# Patient Record
Sex: FEMALE | Race: WHITE | NOT HISPANIC OR LATINO | Employment: OTHER | URBAN - METROPOLITAN AREA
[De-identification: names, ages, dates, MRNs, and addresses within clinical notes are randomized per-mention and may not be internally consistent; named-entity substitution may affect disease eponyms.]

---

## 2017-11-09 ENCOUNTER — OFFICE VISIT (OUTPATIENT)
Dept: OBSTETRICS AND GYNECOLOGY | Facility: CLINIC | Age: 72
End: 2017-11-09

## 2017-11-09 VITALS
HEIGHT: 63 IN | BODY MASS INDEX: 20.73 KG/M2 | SYSTOLIC BLOOD PRESSURE: 113 MMHG | DIASTOLIC BLOOD PRESSURE: 62 MMHG | HEART RATE: 55 BPM | WEIGHT: 117 LBS

## 2017-11-09 DIAGNOSIS — N39.46 MIXED INCONTINENCE: ICD-10-CM

## 2017-11-09 DIAGNOSIS — Z01.419 VISIT FOR GYNECOLOGIC EXAMINATION: Primary | ICD-10-CM

## 2017-11-09 DIAGNOSIS — M81.0 AGE-RELATED OSTEOPOROSIS WITHOUT CURRENT PATHOLOGICAL FRACTURE: ICD-10-CM

## 2017-11-09 DIAGNOSIS — N95.2 ATROPHIC VAGINITIS: ICD-10-CM

## 2017-11-09 LAB
BILIRUB BLD-MCNC: NEGATIVE MG/DL
CLARITY, POC: CLEAR
COLOR UR: YELLOW
GLUCOSE UR STRIP-MCNC: NEGATIVE MG/DL
KETONES UR QL: NEGATIVE
LEUKOCYTE EST, POC: ABNORMAL
NITRITE UR-MCNC: NEGATIVE MG/ML
PH UR: 5.5 [PH] (ref 5–8)
PROT UR STRIP-MCNC: NEGATIVE MG/DL
RBC # UR STRIP: NEGATIVE /UL
SP GR UR: 1.02 (ref 1–1.03)
UROBILINOGEN UR QL: NORMAL

## 2017-11-09 PROCEDURE — 81002 URINALYSIS NONAUTO W/O SCOPE: CPT | Performed by: OBSTETRICS & GYNECOLOGY

## 2017-11-09 PROCEDURE — 99397 PER PM REEVAL EST PAT 65+ YR: CPT | Performed by: OBSTETRICS & GYNECOLOGY

## 2017-11-09 RX ORDER — OMEPRAZOLE 20 MG/1
CAPSULE, DELAYED RELEASE ORAL
COMMUNITY
Start: 2017-11-06 | End: 2020-01-18 | Stop reason: HOSPADM

## 2017-11-09 RX ORDER — IBUPROFEN 800 MG/1
TABLET ORAL
COMMUNITY
Start: 2017-06-20 | End: 2020-01-18 | Stop reason: HOSPADM

## 2017-11-09 RX ORDER — VITAMIN E 268 MG
400 CAPSULE ORAL
COMMUNITY
End: 2020-01-18 | Stop reason: HOSPADM

## 2017-11-09 RX ORDER — METOPROLOL SUCCINATE 50 MG/1
TABLET, EXTENDED RELEASE ORAL
COMMUNITY
Start: 2017-11-06 | End: 2020-01-18 | Stop reason: HOSPADM

## 2017-11-09 RX ORDER — TOPIRAMATE 25 MG/1
TABLET ORAL
Refills: 3 | COMMUNITY
Start: 2017-10-10 | End: 2022-11-10 | Stop reason: ALTCHOICE

## 2017-11-09 RX ORDER — TELMISARTAN AND HYDROCHLORTHIAZIDE 80; 12.5 MG/1; MG/1
TABLET ORAL
Refills: 3 | COMMUNITY
Start: 2017-09-08 | End: 2020-01-18 | Stop reason: HOSPADM

## 2017-11-09 RX ORDER — IBUPROFEN 200 MG
1 CAPSULE ORAL
COMMUNITY

## 2017-11-09 RX ORDER — GUAIFENESIN 600 MG/1
600 TABLET, EXTENDED RELEASE ORAL
COMMUNITY
Start: 2017-01-10 | End: 2019-12-30

## 2017-11-09 RX ORDER — ESTRADIOL 0.1 MG/G
2 CREAM VAGINAL 3 TIMES WEEKLY
Qty: 42.5 G | Refills: 3 | Status: SHIPPED | OUTPATIENT
Start: 2017-11-10 | End: 2019-12-30

## 2017-11-09 NOTE — PROGRESS NOTES
Subjective   Della Vines is a 71 y.o. female   CC: Annual exam, urinary leakage/urgency  History of Present Illness  Pt here for annual.  She has previously seen my partner Dr. Carroll.  Her last visit was October 2015.  DEXA scan at that time showed osteoporosis.  The patient reported she did not want to continue with the bisphosphonate therapy.  She is taking vitamin D and calcium supplementation.  She has not had a DEXA scan since then.  She has a history of a hysterectomy in the past.  She has no previous history of cervical dysplasia.  She no longer requires Pap smears.  She is going to do her mammogram this month.  She does have a history of fibrocystic breasts and has had some breast biopsies in the past.  The patient does perform her own breast exams.  She is no longer a smoker.  The patient does report some issues with urinary frequency, urgency, and leakage of urine.  She does report occasional episodes of small amount of leakage with cough and sneeze.  Additionally, she reports that she often cannot make it to the bathroom in time.  She has tried therapy with anticholinergics in the past, but wanted to stop them because of side effects of dry mouth.  She has never taken estrogen replacement therapy or vaginal Estrace cream.  The patient does have a history of TIAs.  She currently takes an aspirin pill daily.  The patient does report issues with vaginal discomfort and painful intercourse.  She has not been sexually active with her  for about the last 5 years, because of concerns with both pain during intercourse and with leakage of urine during intercourse which was embarrassing for her.    The following portions of the patient's history were reviewed and updated as appropriate: allergies, current medications, past family history, past medical history, past social history, past surgical history and problem list.    Review of Systems  General: No fever or chills  Constitutional: No weight loss or gain,  "no hair loss  HENT: No headache, no hearing loss, no tinnitus  Eyes: normal vision, no eye pain  Lungs: No cough, no shortness of breath  Heart: No chest pain, no palpitations  Abdomen: No nausea, vomiting, constipation or diarrhea  : No dysuria, no hematuria  Skin: No rashes  Lymph: No swelling  Neuro: No parathesia, no weakness  Psych: Normal though content, no hallucinations, no SI/HI    Objective   Physical Exam  Vitals:    11/09/17 1025   BP: 113/62   Pulse: 55   Weight: 117 lb (53.1 kg)   Height: 62.5\" (158.8 cm)   Gen: No acute distress, awake and oriented times three  HENT: Normocephalic, atraumatic, Moist mucous membranes  Eyes: PERRLA, EOMI  Neck: Supple, normal range of motion, no thyromegaly  Lungs: Normal work of breathing, lungs clear bilaterally, no crackles/wheezes  Heart: Regular rate and rhythm, no murmurs  Abdomen: soft, nontender, non distended, normoactive bowel sounds  Breast: Symmetrical. No skin changes or nipple retractions. No lumps or masses bilaterally. No tenderness bilaterally.  Pelvic:   Normal external female genitalia, no lesions  Vagina: No blood or discharge, significant atrophy noted, no lesions, No significant cystocele/rectocele  Cervix: Absent, cuff intact no masses  Uterus: Surgically absent  Adnexa: No masses or tenderness  Rectal: Deferred  Skin: Warm and dry, no rashes  Psych: Good judgement and insight, normal affect and mood  Neuro: CN 2-12 intact, no gross deficits    Assessment/Plan   Diagnoses and all orders for this visit:    Visit for gynecologic examination    Mixed incontinence  -     estradiol (ESTRACE VAGINAL) 0.1 MG/GM vaginal cream; Insert 2 g into the vagina 3 (Three) Times a Week.    Atrophic vaginitis  -     estradiol (ESTRACE VAGINAL) 0.1 MG/GM vaginal cream; Insert 2 g into the vagina 3 (Three) Times a Week.    Age-related osteoporosis without current pathological fracture    Patient no longer needs Pap smears.  I recommend that she repeat a DEXA scan at " this time because of her history of osteoporosis.  She is scheduled for mammogram next year.  The patient does have symptoms of mixed incontinence as well as significant vaginal atrophy on exam.  Patient reports that she has not been sexually active because of these concerns.  She also reports vaginal irritation.  I feel that she may get some benefit from topical vaginal estrogen cream.  We discussed the risks and benefits of this medication.  I explained that there are well designed studies such as the women's health initiative which showed no significant increase of breast cancer risk in women taking estrogen only supplementation.  Additionally, there has been shown to be no significant risk of venous thromboembolism in patients taking small amounts of vaginal estrogen.  I believe this would be a safe therapy for her.  The patient wishes to start this therapy.  Instructions for use were given.  If she does not see any improvement in her symptoms over the next 4 weeks, she may discontinue vaginal estrogen.  Otherwise she may continue it 3 times weekly.  I will plan to see her back in one year for annual exam.

## 2017-11-13 ENCOUNTER — TELEPHONE (OUTPATIENT)
Dept: OBSTETRICS AND GYNECOLOGY | Facility: CLINIC | Age: 72
End: 2017-11-13

## 2017-11-13 NOTE — TELEPHONE ENCOUNTER
Mavis     I put in order. If it shows up in my overdue box because they do not use it. I am sending to you to cancel.     Thanks   Dr. Champion    ----- Message from Estela Hernandez sent at 11/13/2017  9:14 AM EST -----  Please place order for DEXA scan (per notes). Pt to schedule at [facility].    Thanks!

## 2017-11-28 ENCOUNTER — TELEPHONE (OUTPATIENT)
Dept: OBSTETRICS AND GYNECOLOGY | Facility: CLINIC | Age: 72
End: 2017-11-28

## 2017-11-28 DIAGNOSIS — Z13.820 SCREENING FOR OSTEOPOROSIS: Primary | ICD-10-CM

## 2017-11-28 NOTE — TELEPHONE ENCOUNTER
Cooper     That is done     Thanks   Dr. Champion    ----- Message from Leslie Causey sent at 11/28/2017  1:20 PM EST -----  Contact: [facility]  [facility] called stating theres no diagnosis to the patients DEXA scan referral and was wondering if you could finalize it so she can pull it? Would you be able to do that?

## 2017-11-30 ENCOUNTER — APPOINTMENT (OUTPATIENT)
Dept: WOMENS IMAGING | Facility: HOSPITAL | Age: 72
End: 2017-11-30

## 2017-11-30 PROCEDURE — G0202 SCR MAMMO BI INCL CAD: HCPCS | Performed by: RADIOLOGY

## 2017-11-30 PROCEDURE — 77080 DXA BONE DENSITY AXIAL: CPT | Performed by: RADIOLOGY

## 2017-11-30 PROCEDURE — 77063 BREAST TOMOSYNTHESIS BI: CPT | Performed by: RADIOLOGY

## 2017-12-04 ENCOUNTER — TELEPHONE (OUTPATIENT)
Dept: OBSTETRICS AND GYNECOLOGY | Facility: CLINIC | Age: 72
End: 2017-12-04

## 2017-12-04 NOTE — TELEPHONE ENCOUNTER
----- Message from Danita Goodrich MA sent at 12/4/2017 12:48 PM EST -----  Soumya roman pt/alberto  ----- Message -----     From: Estuardo Champion MD     Sent: 12/4/2017  12:33 PM       To: Danita Goodrich MA    Danita, Mild osteoporosis, but her fracture risk does not require treatment with bisphosphonate - Weight bearing exercise, dietary calcium of 1200 mg per day and vitamin D all encouraged.  Please let her know, ok to come in and discuss medication if she desires. Thanks Dr. Champion

## 2017-12-08 ENCOUNTER — DOCUMENTATION (OUTPATIENT)
Dept: OBSTETRICS AND GYNECOLOGY | Facility: CLINIC | Age: 72
End: 2017-12-08

## 2017-12-08 NOTE — PROGRESS NOTES
I spoke with the patient at length over the telephone about her DEXA scan results.  T score the L-spine was -2.7.  This is actually stable from her previous DEXA scan about 2 years ago.  The patient has not been on any type of bisphosphonate therapy since then.  She previously took bisphosphonates but could not tolerate them over concerns about side effects of GERD.  I offered the patient either a trial of bisphosphonate therapy again versus close observation with continued vitamin D and calcium and supplementation.  Patient reports her primary care physician also contacted her regarding the DEXA results and wanted to evaluate her.  One option for the patient will be trial of the second line agent, which I feel it would be best for her to discuss with her primary care physician.  She verbalizes understanding.  Otherwise I'll plan to see her back yearly for her annual exam.

## 2019-05-08 ENCOUNTER — TRANSCRIBE ORDERS (OUTPATIENT)
Dept: CARDIOLOGY | Facility: CLINIC | Age: 74
End: 2019-05-08

## 2019-05-08 DIAGNOSIS — I73.9 CLAUDICATION (HCC): ICD-10-CM

## 2019-05-08 DIAGNOSIS — R07.9 CHEST PAIN, UNSPECIFIED TYPE: Primary | ICD-10-CM

## 2019-05-14 ENCOUNTER — HOSPITAL ENCOUNTER (OUTPATIENT)
Dept: CARDIOLOGY | Facility: HOSPITAL | Age: 74
Discharge: HOME | End: 2019-05-14

## 2019-05-14 ENCOUNTER — HOSPITAL ENCOUNTER (OUTPATIENT)
Dept: CARDIOLOGY | Facility: HOSPITAL | Age: 74
Discharge: HOME | End: 2019-05-14
Admitting: INTERNAL MEDICINE

## 2019-05-14 VITALS — WEIGHT: 117 LBS | HEIGHT: 63 IN | BODY MASS INDEX: 20.73 KG/M2

## 2019-05-14 DIAGNOSIS — R07.9 CHEST PAIN, UNSPECIFIED TYPE: ICD-10-CM

## 2019-05-14 DIAGNOSIS — I73.9 CLAUDICATION (HCC): ICD-10-CM

## 2019-05-14 LAB
ASCENDING AORTA: 2.4 CM
BH CV ECHO MEAS - ACS: 1.6 CM
BH CV ECHO MEAS - AO MAX PG (FULL): 2.9 MMHG
BH CV ECHO MEAS - AO MAX PG: 9.1 MMHG
BH CV ECHO MEAS - AO MEAN PG (FULL): 2 MMHG
BH CV ECHO MEAS - AO MEAN PG: 4.6 MMHG
BH CV ECHO MEAS - AO ROOT AREA (BSA CORRECTED): 1.7
BH CV ECHO MEAS - AO ROOT AREA: 5.3 CM^2
BH CV ECHO MEAS - AO ROOT DIAM: 2.6 CM
BH CV ECHO MEAS - AO V2 MAX: 150.8 CM/SEC
BH CV ECHO MEAS - AO V2 MEAN: 98.5 CM/SEC
BH CV ECHO MEAS - AO V2 VTI: 36.1 CM
BH CV ECHO MEAS - AVA(I,A): 1.9 CM^2
BH CV ECHO MEAS - AVA(I,D): 1.9 CM^2
BH CV ECHO MEAS - AVA(V,A): 2.3 CM^2
BH CV ECHO MEAS - AVA(V,D): 2.3 CM^2
BH CV ECHO MEAS - BSA(HAYCOCK): 1.5 M^2
BH CV ECHO MEAS - BSA: 1.5 M^2
BH CV ECHO MEAS - BZI_BMI: 20 KILOGRAMS/M^2
BH CV ECHO MEAS - BZI_METRIC_HEIGHT: 160 CM
BH CV ECHO MEAS - BZI_METRIC_WEIGHT: 51.3 KG
BH CV ECHO MEAS - EDV(MOD-SP2): 46 ML
BH CV ECHO MEAS - EDV(MOD-SP4): 51 ML
BH CV ECHO MEAS - EDV(TEICH): 73 ML
BH CV ECHO MEAS - EF(CUBED): 78 %
BH CV ECHO MEAS - EF(MOD-BP): 61 %
BH CV ECHO MEAS - EF(MOD-SP2): 60.9 %
BH CV ECHO MEAS - EF(MOD-SP4): 60.8 %
BH CV ECHO MEAS - EF(TEICH): 70.7 %
BH CV ECHO MEAS - ESV(MOD-SP2): 18 ML
BH CV ECHO MEAS - ESV(MOD-SP4): 20 ML
BH CV ECHO MEAS - ESV(TEICH): 21.4 ML
BH CV ECHO MEAS - IVS/LVPW: 1.1
BH CV ECHO MEAS - IVSD: 1 CM
BH CV ECHO MEAS - LAT PEAK E' VEL: 12 CM/SEC
BH CV ECHO MEAS - LV DIASTOLIC VOL/BSA (35-75): 33.6 ML/M^2
BH CV ECHO MEAS - LV MASS(C)D: 120 GRAMS
BH CV ECHO MEAS - LV MASS(C)DI: 79.1 GRAMS/M^2
BH CV ECHO MEAS - LV MAX PG: 6.2 MMHG
BH CV ECHO MEAS - LV MEAN PG: 2.6 MMHG
BH CV ECHO MEAS - LV SYSTOLIC VOL/BSA (12-30): 13.2 ML/M^2
BH CV ECHO MEAS - LV V1 MAX: 124.7 CM/SEC
BH CV ECHO MEAS - LV V1 MEAN: 72.3 CM/SEC
BH CV ECHO MEAS - LV V1 VTI: 25.2 CM
BH CV ECHO MEAS - LVIDD: 4.1 CM
BH CV ECHO MEAS - LVIDS: 2.5 CM
BH CV ECHO MEAS - LVLD AP2: 5.9 CM
BH CV ECHO MEAS - LVLD AP4: 5.6 CM
BH CV ECHO MEAS - LVLS AP2: 5 CM
BH CV ECHO MEAS - LVLS AP4: 4.5 CM
BH CV ECHO MEAS - LVOT AREA (M): 2.8 CM^2
BH CV ECHO MEAS - LVOT AREA: 2.8 CM^2
BH CV ECHO MEAS - LVOT DIAM: 1.9 CM
BH CV ECHO MEAS - LVPWD: 0.88 CM
BH CV ECHO MEAS - MED PEAK E' VEL: 8 CM/SEC
BH CV ECHO MEAS - MV A DUR: 0.13 SEC
BH CV ECHO MEAS - MV A MAX VEL: 67.4 CM/SEC
BH CV ECHO MEAS - MV DEC SLOPE: 571.1 CM/SEC^2
BH CV ECHO MEAS - MV DEC TIME: 0.17 SEC
BH CV ECHO MEAS - MV E MAX VEL: 83.4 CM/SEC
BH CV ECHO MEAS - MV E/A: 1.2
BH CV ECHO MEAS - MV MAX PG: 3.1 MMHG
BH CV ECHO MEAS - MV MEAN PG: 1.2 MMHG
BH CV ECHO MEAS - MV P1/2T MAX VEL: 96.5 CM/SEC
BH CV ECHO MEAS - MV P1/2T: 49.5 MSEC
BH CV ECHO MEAS - MV V2 MAX: 87.5 CM/SEC
BH CV ECHO MEAS - MV V2 MEAN: 49.6 CM/SEC
BH CV ECHO MEAS - MV V2 VTI: 26.9 CM
BH CV ECHO MEAS - MVA P1/2T LCG: 2.3 CM^2
BH CV ECHO MEAS - MVA(P1/2T): 4.4 CM^2
BH CV ECHO MEAS - MVA(VTI): 2.6 CM^2
BH CV ECHO MEAS - PA MAX PG (FULL): 4.7 MMHG
BH CV ECHO MEAS - PA MAX PG: 6.2 MMHG
BH CV ECHO MEAS - PA V2 MAX: 124.9 CM/SEC
BH CV ECHO MEAS - PULM A REVS DUR: 0.12 SEC
BH CV ECHO MEAS - PULM A REVS VEL: 24.7 CM/SEC
BH CV ECHO MEAS - PULM DIAS VEL: 72.3 CM/SEC
BH CV ECHO MEAS - PULM S/D: 0.87
BH CV ECHO MEAS - PULM SYS VEL: 63.1 CM/SEC
BH CV ECHO MEAS - PVA(V,A): 1.1 CM^2
BH CV ECHO MEAS - PVA(V,D): 1.1 CM^2
BH CV ECHO MEAS - QP/QS: 0.53
BH CV ECHO MEAS - RAP SYSTOLE: 3 MMHG
BH CV ECHO MEAS - RV MAX PG: 1.6 MMHG
BH CV ECHO MEAS - RV MEAN PG: 0.97 MMHG
BH CV ECHO MEAS - RV V1 MAX: 62.6 CM/SEC
BH CV ECHO MEAS - RV V1 MEAN: 46.5 CM/SEC
BH CV ECHO MEAS - RV V1 VTI: 16 CM
BH CV ECHO MEAS - RVOT AREA: 2.3 CM^2
BH CV ECHO MEAS - RVOT DIAM: 1.7 CM
BH CV ECHO MEAS - RVSP: 26 MMHG
BH CV ECHO MEAS - SI(AO): 127.1 ML/M^2
BH CV ECHO MEAS - SI(CUBED): 34.7 ML/M^2
BH CV ECHO MEAS - SI(LVOT): 45.9 ML/M^2
BH CV ECHO MEAS - SI(MOD-SP2): 18.5 ML/M^2
BH CV ECHO MEAS - SI(MOD-SP4): 20.4 ML/M^2
BH CV ECHO MEAS - SI(TEICH): 34 ML/M^2
BH CV ECHO MEAS - SUP REN AO DIAM: 1.2 CM
BH CV ECHO MEAS - SV(AO): 192.8 ML
BH CV ECHO MEAS - SV(CUBED): 52.6 ML
BH CV ECHO MEAS - SV(LVOT): 69.6 ML
BH CV ECHO MEAS - SV(MOD-SP2): 28 ML
BH CV ECHO MEAS - SV(MOD-SP4): 31 ML
BH CV ECHO MEAS - SV(RVOT): 36.6 ML
BH CV ECHO MEAS - SV(TEICH): 51.6 ML
BH CV ECHO MEAS - TAPSE (>1.6): 1.3 CM2
BH CV ECHO MEAS - TR MAX VEL: 237.5 CM/SEC
BH CV ECHO MEASUREMENTS AVERAGE E/E' RATIO: 8.34
BH CV LOWER ARTERIAL LEFT ABI RATIO: 0.96
BH CV LOWER ARTERIAL LEFT GREAT TOE SYS MAX: 74 MMHG
BH CV LOWER ARTERIAL LEFT HIGH THIGH SYS MAX: 138 MMHG
BH CV LOWER ARTERIAL LEFT POPLITEAL SYS MAX: 149 MMHG
BH CV LOWER ARTERIAL LEFT POST EX ABI RATIO: 1
BH CV LOWER ARTERIAL LEFT POST TIBIAL SYS MAX: 144 MMHG
BH CV LOWER ARTERIAL RIGHT ABI RATIO: 0.96
BH CV LOWER ARTERIAL RIGHT GREAT TOE SYS MAX: 74 MMHG
BH CV LOWER ARTERIAL RIGHT HIGH THIGH SYS MAX: 156 MMHG
BH CV LOWER ARTERIAL RIGHT POPLITEAL SYS MAX: 148 MMHG
BH CV LOWER ARTERIAL RIGHT POST EX ABI RATIO: 0.74
BH CV LOWER ARTERIAL RIGHT POST TIBIAL SYS MAX: 144 MMHG
BH CV XLRA - RV BASE: 2.8 CM
BH CV XLRA - TDI S': 15 CM/SEC
LEFT ATRIUM VOLUME INDEX: 26 ML/M2
SINUS: 2.8 CM
STJ: 2.7 CM
UPPER ARTERIAL LEFT ARM BRACHIAL SYS MAX: 149 MMHG
UPPER ARTERIAL RIGHT ARM BRACHIAL SYS MAX: 150 MMHG

## 2019-05-14 PROCEDURE — 93922 UPR/L XTREMITY ART 2 LEVELS: CPT

## 2019-05-14 PROCEDURE — 93924 LWR XTR VASC STDY BILAT: CPT

## 2019-05-14 PROCEDURE — 93306 TTE W/DOPPLER COMPLETE: CPT

## 2019-05-14 PROCEDURE — 93306 TTE W/DOPPLER COMPLETE: CPT | Performed by: INTERNAL MEDICINE

## 2019-05-14 PROCEDURE — 93924 LWR XTR VASC STDY BILAT: CPT | Performed by: INTERNAL MEDICINE

## 2019-11-05 ENCOUNTER — APPOINTMENT (OUTPATIENT)
Dept: WOMENS IMAGING | Facility: HOSPITAL | Age: 74
End: 2019-11-05

## 2019-11-05 PROCEDURE — 77067 SCR MAMMO BI INCL CAD: CPT | Performed by: RADIOLOGY

## 2019-11-05 PROCEDURE — MDREVIEWSP: Performed by: RADIOLOGY

## 2019-11-05 PROCEDURE — 77063 BREAST TOMOSYNTHESIS BI: CPT | Performed by: RADIOLOGY

## 2019-12-30 ENCOUNTER — OFFICE VISIT (OUTPATIENT)
Dept: CARDIOLOGY | Facility: CLINIC | Age: 74
End: 2019-12-30

## 2019-12-30 VITALS
BODY MASS INDEX: 21.05 KG/M2 | WEIGHT: 118.8 LBS | HEART RATE: 55 BPM | DIASTOLIC BLOOD PRESSURE: 80 MMHG | SYSTOLIC BLOOD PRESSURE: 158 MMHG | HEIGHT: 63 IN

## 2019-12-30 DIAGNOSIS — I48.0 PAROXYSMAL ATRIAL FIBRILLATION (HCC): ICD-10-CM

## 2019-12-30 DIAGNOSIS — E78.2 MIXED HYPERLIPIDEMIA: ICD-10-CM

## 2019-12-30 DIAGNOSIS — R07.9 CHEST PAIN, UNSPECIFIED TYPE: Primary | ICD-10-CM

## 2019-12-30 DIAGNOSIS — I63.89 OTHER CEREBRAL INFARCTION (HCC): ICD-10-CM

## 2019-12-30 DIAGNOSIS — I10 ESSENTIAL HYPERTENSION: ICD-10-CM

## 2019-12-30 DIAGNOSIS — I63.9 CEREBROVASCULAR ACCIDENT (CVA), UNSPECIFIED MECHANISM (HCC): ICD-10-CM

## 2019-12-30 PROBLEM — E78.5 HYPERLIPIDEMIA: Status: ACTIVE | Noted: 2019-12-30

## 2019-12-30 PROCEDURE — 99204 OFFICE O/P NEW MOD 45 MIN: CPT | Performed by: INTERNAL MEDICINE

## 2019-12-30 PROCEDURE — 93000 ELECTROCARDIOGRAM COMPLETE: CPT | Performed by: INTERNAL MEDICINE

## 2019-12-30 RX ORDER — ATORVASTATIN CALCIUM 20 MG/1
20 TABLET, FILM COATED ORAL DAILY
COMMUNITY
Start: 2019-03-15 | End: 2020-01-27 | Stop reason: SDUPTHER

## 2019-12-30 RX ORDER — AMLODIPINE BESYLATE 5 MG/1
5 TABLET ORAL DAILY
Qty: 90 TABLET | Refills: 3 | Status: SHIPPED | OUTPATIENT
Start: 2019-12-30 | End: 2020-08-26 | Stop reason: SDUPTHER

## 2019-12-30 NOTE — PROGRESS NOTES
[facility] New Patient Office Note     Encounter Date:19  Patient:Della Vines  :1945  MRN:[id number]    Referring Provider: Kingsley Zimmerman MD    Consulted for: Chest pain and hypertension    Chief Complaint:   Chief Complaint   Patient presents with   • Chest Pain   • Shortness of Breath   • Hypertension     History of Presenting Illness:      Ms. Vines is a 74-year-old woman with past medical history notable for TIAs, hypertension, mixed hyperlipidemia, and chronic lung disease who presents to my office for an initial evaluation regarding recent episode of chest discomfort as well as difficult to control hypertension.    Patient states that she was awoken from her sleep approximately 3 weeks ago with an episode of chest tightness and shortness of breath.  At the time her blood pressure was over 200 systolic.  She had associated numbness and tingling of her lips.  She denied any focal weakness.  Her symptoms continued for approximately 45 minutes and then finally resolved on her own.  Symptoms were severe but not enough to call EMS.  The next morning she felt back to normal and thus decided not to get checked out.  She did not see her primary care physician about this issue yet or seek out further medical attention.  Fortunately since then she has not had any further episodes of chest discomfort or TIA or strokelike symptoms.    With regards to her history of TIAs these were diagnosed on work-up of her chronic migraines.  MRI demonstrated focal area of small infarcts which were chronic in nature and per the patient basically asymptomatic.  She was recommended to be on aspirin and statin.  She does not take aspirin because of concerns of bruising and finally has started statin therapy.  She has not had any prior work-up of her history of TIAs as these were found by her psychiatrist/neurologist incidentally.    With regards to her hypertension she has been on a relatively stable regimen for  a number of years.  She denies any complications with her current medical regimen.  In the past blood pressure has been better controlled but lately her blood pressure typically runs in the 150s as high as 170s at home.    Review of Systems:  Review of Systems   Constitution: Negative.   Eyes: Negative.    Cardiovascular: Positive for chest pain.   Respiratory: Positive for cough.    Endocrine: Negative.    Hematologic/Lymphatic: Negative.    Skin: Negative.    Musculoskeletal: Negative.    Gastrointestinal: Negative.    Genitourinary: Negative.    Neurological: Positive for numbness and paresthesias.   Psychiatric/Behavioral: Negative.    Allergic/Immunologic: Negative.        Current Outpatient Medications on File Prior to Visit   Medication Sig Dispense Refill   • atorvastatin (LIPITOR) 20 MG tablet Take 20 mg by mouth Daily.     • calcium carbonate (OS-JERRELL) 1250 (500 Ca) MG tablet Take 1 tablet by mouth.     • Cholecalciferol 2000 units tablet Take 4,000 Int'l Units by mouth.     • ibuprofen (ADVIL,MOTRIN) 800 MG tablet TAKE 1 TABLET BY MOUTH EVERY 8 HOURS AS NEEDED FOR PAIN     • metoprolol succinate XL (TOPROL-XL) 50 MG 24 hr tablet      • OMEGA-3 FATTY ACIDS PO Take  by mouth.     • omeprazole (priLOSEC) 20 MG capsule      • telmisartan-hydrochlorothiazide (MICARDIS HCT) 80-12.5 MG per tablet TK 1/2 T PO D  3   • topiramate (TOPAMAX) 25 MG tablet TK 1 T PO BID  3   • vitamin E 400 UNIT capsule Take 400 Units by mouth.     • [DISCONTINUED] aspirin 81 MG tablet Take 81 mg by mouth.     • [DISCONTINUED] estradiol (ESTRACE VAGINAL) 0.1 MG/GM vaginal cream Insert 2 g into the vagina 3 (Three) Times a Week. 42.5 g 3   • [DISCONTINUED] guaiFENesin (MUCINEX) 600 MG 12 hr tablet Take 600 mg by mouth.       No current facility-administered medications on file prior to visit.        Allergies   Allergen Reactions   • Codeine    • Erythromycin Diarrhea     Side effect    • Iodine Hives   • Lisinopril Other (See Comments)  "    COUGH   • Moxifloxacin      Elevated liver enzymes   • Sulfa Antibiotics        Past Medical History:   Diagnosis Date   • Acid reflux disease    • Bell's palsy    • Hyperlipidemia    • Hypertension    • Lung disease    • Osteoporosis    • Stroke (CMS/HCC)    • Urinary tract infection        Past Surgical History:   Procedure Laterality Date   • ADENOIDECTOMY     • APPENDECTOMY     • BREAST FIBROADENOMA SURGERY     • HYSTERECTOMY     • MASTOID SURGERY     • TONSILLECTOMY         Social History     Socioeconomic History   • Marital status:      Spouse name: Not on file   • Number of children: Not on file   • Years of education: Not on file   • Highest education level: Not on file   Tobacco Use   • Smoking status: Former Smoker     Packs/day: 1.50     Years: 30.00     Pack years: 45.00     Last attempt to quit:      Years since quittin.0   • Smokeless tobacco: Never Used   • Tobacco comment: caffeine use    Substance and Sexual Activity   • Alcohol use: No   • Drug use: No       Family History   Problem Relation Age of Onset   • Heart failure Father    • Hypertension Father    • Heart disease Father    • Breast cancer Mother    • Aneurysm Daughter        The following portions of the patient's history were reviewed and updated as appropriate: allergies, current medications, past family history, past medical history, past social history, past surgical history and problem list.       Objective:       Vitals:    19 1313 19 1315   BP: 150/82 158/80   BP Location: Left arm Right arm   Patient Position: Sitting Sitting   Pulse: 55    Weight: 53.9 kg (118 lb 12.8 oz)    Height: 158.8 cm (62.52\")        Physical Exam:  Constitutional: Well appearing, well developed, no acute distress   HENT: Oropharynx clear and membrane moist  Eyes: Normal conjunctiva, no sclera icterus.  Neck: Supple, no carotid bruit bilaterally.  Cardiovascular: Regular rate and rhythm, No Murmur, No bilateral lower " extremity edema.  Pulmonary: Normal respiratory effort, normal lung sounds, no wheezing.  Abdominal: Soft, nontender, no hepatosplenomegaly, liver is non-pulsatile.  Neurological: Alert and orient x 3.   Skin: Warm, dry, no ecchymosis, no rash.  Psych: Appropriate mood and affect. Normal judgment and insight.      Lab Results   Component Value Date    BUN 19 11/07/2019    CREATININE 0.7 11/07/2019    BCR 28.0 (H) 11/07/2019    K 4.2 11/07/2019    CO2 26 11/07/2019    CALCIUM 9.8 11/07/2019    ALBUMIN 4.4 11/07/2019    LABIL2 1.6 11/07/2019    AST 19 11/07/2019    ALT 16 11/07/2019       Lab Results   Component Value Date    WBC 10.09 11/07/2019    HGB 13.5 11/07/2019    HCT 42.9 11/07/2019    MCV 93.7 11/07/2019     11/07/2019       Lab Results   Component Value Date    CHLPL 138 11/07/2019    CHLPL 191 04/25/2019    CHLPL 136 10/18/2018     Lab Results   Component Value Date    TRIG 121 11/07/2019    TRIG 185 (H) 04/25/2019    TRIG 186 (H) 10/18/2018     Lab Results   Component Value Date    HDL 39 (L) 11/07/2019    HDL 40 (L) 04/25/2019    HDL 37 (L) 10/18/2018     Lab Results   Component Value Date    LDL 75 11/07/2019     (H) 04/25/2019    LDL 62 10/18/2018         ECG 12 Lead  Date/Time: 12/30/2019 3:28 PM  Performed by: Kingsley Zimmerman MD  Authorized by: Kingsley Zimmerman MD   Previous ECG: no previous ECG available  Rhythm: sinus rhythm  BPM: 55    Clinical impression: normal ECG          Echocardiogram 5/14/2019:  · Calculated EF = 61%.  · Left ventricular systolic function is normal.  · Trace-to-mild mitral valve regurgitation is present with a centrally-directed jet noted.  · Trace to mild tricuspid valve regurgitation is present.  · Estimated right ventricular systolic pressure from tricuspid regurgitation is normal (<35 mmHg)..    Lower extremity arterial Doppler 5/14/2019:  · Right Conclusion: The right CORTEZ is normal.  · Left Conclusion: The left CORTEZ is normal.        Assessment:           Diagnosis Plan   1. Chest pain, unspecified type  Stress Test With Myocardial Perfusion One Day   2. Essential hypertension  amLODIPine (NORVASC) 5 MG tablet   3. Mixed hyperlipidemia     4. Cerebrovascular accident (CVA), unspecified mechanism (CMS/HCC)  Duplex Carotid Ultrasound CAR    Holter Monitor - 72 Hour Up To 21 Days   5. Other cerebral infarction (CMS/HCC)   Duplex Carotid Ultrasound CAR   6. Paroxysmal atrial fibrillation (CMS/HCC)   Holter Monitor - 72 Hour Up To 21 Days          Plan:       Ms. Vines is a 74-year-old woman with past medical history notable for TIAs, hypertension, mixed hyperlipidemia, and chronic lung disease who presents to my office for an initial evaluation regarding recent episode of chest discomfort as well as difficult to control hypertension.  In general she is doing fairly well but optimization of her blood pressure control would be helpful for prevention of future strokes.  Would like to add amlodipine to her regimen to see if this will improve her blood pressure control.  From a secondary prevention standpoint I did discuss the importance of statin therapy and continuing this medication as well as restarting aspirin.  Her bruising is mainly nuisance bruising located in the upper extremities from capillary rupture and likely should not worsen too much on aspirin therapy.    With regards to her recent episode this could be representation of a another TIA or potential cardiac etiology and I would recommend further evaluation of both of these issues.  I have ordered a stress test to further a stratify her from a cardiovascular standpoint.  Furthermore I have ordered carotid Dopplers to ensure that she does not have any new plaque which may be contributing to her symptoms.    Chest pain:  · Further evaluate with stress test  · We will also optimize blood pressure regimen with adding amlodipine    Hypertension:  · Blood pressure not quite at goal despite good medical  therapy  · We will add amlodipine    Mixed hyperlipidemia:  · Patient currently on statin therapy    History of CVA:  · Continue aspirin and statin  · With recent new episode of neurologic changes concerning for TIA will further evaluate with carotid Dopplers  · Additionally obtaining a 2-week monitor would be helpful to ensure that we are not dealing with paroxysmal atrial fibrillation as a possible cause for her history of TIAs and recurrent symptoms.      Follow-up:  3 months    Thank you for allowing me to participate in the care of Della Vines. Feel free to contact me directly with any further questions or concerns.    Kingsley Zimmerman MD  [facility]  12/30/19  1:56 PM

## 2019-12-30 NOTE — PATIENT INSTRUCTIONS
1. Will start amlodipine 5 mg daily to help with blood pressure  2. Will check stress test to evaluate chest pain episode  3. Will check carotid ultrasound and event monitor to look for reasons for TIA/Stroke to exclude carotid disease or atrial fibrillation  4. Start aspirin for stroke prevent

## 2020-01-07 ENCOUNTER — HOSPITAL ENCOUNTER (OUTPATIENT)
Dept: CARDIOLOGY | Facility: HOSPITAL | Age: 75
Discharge: HOME | End: 2020-01-07
Admitting: INTERNAL MEDICINE

## 2020-01-07 ENCOUNTER — HOSPITAL ENCOUNTER (OUTPATIENT)
Dept: CARDIOLOGY | Facility: HOSPITAL | Age: 75
Discharge: HOME | End: 2020-01-07

## 2020-01-07 VITALS — WEIGHT: 118.83 LBS | BODY MASS INDEX: 21.05 KG/M2 | HEIGHT: 63 IN

## 2020-01-07 DIAGNOSIS — I63.9 CEREBROVASCULAR ACCIDENT (CVA), UNSPECIFIED MECHANISM (HCC): ICD-10-CM

## 2020-01-07 DIAGNOSIS — I63.89 OTHER CEREBRAL INFARCTION (HCC): ICD-10-CM

## 2020-01-07 DIAGNOSIS — R07.9 CHEST PAIN, UNSPECIFIED TYPE: ICD-10-CM

## 2020-01-07 LAB
BH CV NUCLEAR PRIOR STUDY: 3
BH CV STRESS BP STAGE 1: NORMAL
BH CV STRESS DURATION MIN STAGE 1: 3
BH CV STRESS DURATION MIN STAGE 2: 1
BH CV STRESS DURATION SEC STAGE 1: 0
BH CV STRESS DURATION SEC STAGE 2: 39
BH CV STRESS GRADE STAGE 1: 10
BH CV STRESS GRADE STAGE 2: 12
BH CV STRESS HR STAGE 1: 130
BH CV STRESS HR STAGE 2: 143
BH CV STRESS METS STAGE 1: 5
BH CV STRESS METS STAGE 2: 7.5
BH CV STRESS PROTOCOL 1: NORMAL
BH CV STRESS RECOVERY BP: NORMAL MMHG
BH CV STRESS RECOVERY HR: 92 BPM
BH CV STRESS SPEED STAGE 1: 1.7
BH CV STRESS SPEED STAGE 2: 2.5
BH CV STRESS STAGE 1: 1
BH CV STRESS STAGE 2: 2
BH CV XLRA MEAS LEFT DIST CCA EDV: 24.5 CM/SEC
BH CV XLRA MEAS LEFT DIST CCA PSV: 77.8 CM/SEC
BH CV XLRA MEAS LEFT DIST ICA EDV: -24.6 CM/SEC
BH CV XLRA MEAS LEFT DIST ICA PSV: -88.5 CM/SEC
BH CV XLRA MEAS LEFT ICA/CCA RATIO: 1.2
BH CV XLRA MEAS LEFT MID CCA EDV: 21.7 CM/SEC
BH CV XLRA MEAS LEFT MID CCA PSV: 87.6 CM/SEC
BH CV XLRA MEAS LEFT MID ICA EDV: -33.6 CM/SEC
BH CV XLRA MEAS LEFT MID ICA PSV: -97.4 CM/SEC
BH CV XLRA MEAS LEFT PROX CCA EDV: 18.2 CM/SEC
BH CV XLRA MEAS LEFT PROX CCA PSV: 77.1 CM/SEC
BH CV XLRA MEAS LEFT PROX ECA PSV: -122.6 CM/SEC
BH CV XLRA MEAS LEFT PROX ICA EDV: -18.9 CM/SEC
BH CV XLRA MEAS LEFT PROX ICA PSV: -59.5 CM/SEC
BH CV XLRA MEAS LEFT PROX SCLA PSV: 88.5 CM/SEC
BH CV XLRA MEAS LEFT VERTEBRAL A PSV: -30.2 CM/SEC
BH CV XLRA MEAS RIGHT DIST CCA EDV: 22.4 CM/SEC
BH CV XLRA MEAS RIGHT DIST CCA PSV: 82.6 CM/SEC
BH CV XLRA MEAS RIGHT DIST ICA EDV: -27.9 CM/SEC
BH CV XLRA MEAS RIGHT DIST ICA PSV: -106.7 CM/SEC
BH CV XLRA MEAS RIGHT ICA/CCA RATIO: 1.3
BH CV XLRA MEAS RIGHT MID CCA EDV: 18.6 CM/SEC
BH CV XLRA MEAS RIGHT MID CCA PSV: 65.9 CM/SEC
BH CV XLRA MEAS RIGHT MID ICA EDV: -25.5 CM/SEC
BH CV XLRA MEAS RIGHT MID ICA PSV: -88.8 CM/SEC
BH CV XLRA MEAS RIGHT PROX CCA EDV: 16.2 CM/SEC
BH CV XLRA MEAS RIGHT PROX CCA PSV: 83.9 CM/SEC
BH CV XLRA MEAS RIGHT PROX ECA PSV: -116.8 CM/SEC
BH CV XLRA MEAS RIGHT PROX ICA EDV: -19.9 CM/SEC
BH CV XLRA MEAS RIGHT PROX ICA PSV: -77 CM/SEC
BH CV XLRA MEAS RIGHT PROX SCLA PSV: 88 CM/SEC
BH CV XLRA MEAS RIGHT VERTEBRAL A PSV: -64.6 CM/SEC
LV EF NUC BP: 22 %
MAXIMAL PREDICTED HEART RATE: 147 BPM
PERCENT MAX PREDICTED HR: 97.28 %
STRESS BASELINE BP: NORMAL MMHG
STRESS BASELINE HR: 79 BPM
STRESS PERCENT HR: 114 %
STRESS POST ESTIMATED WORKLOAD: 6 METS
STRESS POST EXERCISE DUR MIN: 4 MIN
STRESS POST EXERCISE DUR SEC: 39 SEC
STRESS POST PEAK BP: NORMAL MMHG
STRESS POST PEAK HR: 143 BPM
STRESS TARGET HR: 125 BPM

## 2020-01-07 PROCEDURE — 93018 CV STRESS TEST I&R ONLY: CPT | Performed by: INTERNAL MEDICINE

## 2020-01-07 PROCEDURE — A9502 TC99M TETROFOSMIN: HCPCS | Performed by: INTERNAL MEDICINE

## 2020-01-07 PROCEDURE — 93016 CV STRESS TEST SUPVJ ONLY: CPT | Performed by: INTERNAL MEDICINE

## 2020-01-07 PROCEDURE — 93880 EXTRACRANIAL BILAT STUDY: CPT | Performed by: INTERNAL MEDICINE

## 2020-01-07 PROCEDURE — 93017 CV STRESS TEST TRACING ONLY: CPT

## 2020-01-07 PROCEDURE — 78452 HT MUSCLE IMAGE SPECT MULT: CPT | Performed by: INTERNAL MEDICINE

## 2020-01-07 PROCEDURE — 93880 EXTRACRANIAL BILAT STUDY: CPT

## 2020-01-07 PROCEDURE — 78452 HT MUSCLE IMAGE SPECT MULT: CPT

## 2020-01-07 PROCEDURE — 0 TECHNETIUM TETROFOSMIN KIT: Performed by: INTERNAL MEDICINE

## 2020-01-07 RX ADMIN — TETROFOSMIN 1 DOSE: 1.38 INJECTION, POWDER, LYOPHILIZED, FOR SOLUTION INTRAVENOUS at 13:25

## 2020-01-07 RX ADMIN — TETROFOSMIN 1 DOSE: 1.38 INJECTION, POWDER, LYOPHILIZED, FOR SOLUTION INTRAVENOUS at 12:20

## 2020-01-09 ENCOUNTER — TELEPHONE (OUTPATIENT)
Dept: CARDIOLOGY | Facility: CLINIC | Age: 75
End: 2020-01-09

## 2020-01-09 DIAGNOSIS — R94.39 ABNORMAL STRESS TEST: Primary | ICD-10-CM

## 2020-01-09 NOTE — TELEPHONE ENCOUNTER
Patient micki called Anca delgado stating that Dr. Zimmerman just spoke with MsAlfred Mohinder about her results, and Ms. Vines is stating that she can not remember anything that you told her. The niece is wanting to know if you could give her a call and explain the test results to her so they can have a better understanding. She is on Ms. Vines medical release forms.     Patient micki can be reached at [phone]    Thanks

## 2020-01-13 ENCOUNTER — LAB (OUTPATIENT)
Dept: LAB | Facility: HOSPITAL | Age: 75
End: 2020-01-13

## 2020-01-13 ENCOUNTER — TRANSCRIBE ORDERS (OUTPATIENT)
Dept: CARDIOLOGY | Facility: CLINIC | Age: 75
End: 2020-01-13

## 2020-01-13 DIAGNOSIS — Z13.6 SCREENING FOR CARDIOVASCULAR CONDITION: Primary | ICD-10-CM

## 2020-01-13 DIAGNOSIS — R94.39 ABNORMAL STRESS ECHO: ICD-10-CM

## 2020-01-13 DIAGNOSIS — Z01.810 PREPROCEDURAL CARDIOVASCULAR EXAMINATION: ICD-10-CM

## 2020-01-13 DIAGNOSIS — R94.39 ABNORMAL STRESS TEST: ICD-10-CM

## 2020-01-13 DIAGNOSIS — Z13.6 SCREENING FOR CARDIOVASCULAR CONDITION: ICD-10-CM

## 2020-01-13 LAB
ANION GAP SERPL CALCULATED.3IONS-SCNC: 14.8 MMOL/L (ref 5–15)
BASOPHILS # BLD AUTO: 0.05 10*3/MM3 (ref 0–0.2)
BASOPHILS NFR BLD AUTO: 0.4 % (ref 0–1.5)
BUN BLD-MCNC: 13 MG/DL (ref 8–23)
BUN/CREAT SERPL: 17.3 (ref 7–25)
CALCIUM SPEC-SCNC: 9.8 MG/DL (ref 8.6–10.5)
CHLORIDE SERPL-SCNC: 101 MMOL/L (ref 98–107)
CO2 SERPL-SCNC: 24.2 MMOL/L (ref 22–29)
CREAT BLD-MCNC: 0.75 MG/DL (ref 0.57–1)
DEPRECATED RDW RBC AUTO: 37.8 FL (ref 37–54)
EOSINOPHIL # BLD AUTO: 0.14 10*3/MM3 (ref 0–0.4)
EOSINOPHIL NFR BLD AUTO: 1.2 % (ref 0.3–6.2)
ERYTHROCYTE [DISTWIDTH] IN BLOOD BY AUTOMATED COUNT: 11.8 % (ref 12.3–15.4)
GFR SERPL CREATININE-BSD FRML MDRD: 76 ML/MIN/1.73
GLUCOSE BLD-MCNC: 100 MG/DL (ref 65–99)
HCT VFR BLD AUTO: 40.5 % (ref 34–46.6)
HGB BLD-MCNC: 13.9 G/DL (ref 12–15.9)
IMM GRANULOCYTES # BLD AUTO: 0.04 10*3/MM3 (ref 0–0.05)
IMM GRANULOCYTES NFR BLD AUTO: 0.3 % (ref 0–0.5)
LYMPHOCYTES # BLD AUTO: 2.02 10*3/MM3 (ref 0.7–3.1)
LYMPHOCYTES NFR BLD AUTO: 16.8 % (ref 19.6–45.3)
MCH RBC QN AUTO: 30.3 PG (ref 26.6–33)
MCHC RBC AUTO-ENTMCNC: 34.3 G/DL (ref 31.5–35.7)
MCV RBC AUTO: 88.2 FL (ref 79–97)
MONOCYTES # BLD AUTO: 0.56 10*3/MM3 (ref 0.1–0.9)
MONOCYTES NFR BLD AUTO: 4.7 % (ref 5–12)
NEUTROPHILS # BLD AUTO: 9.22 10*3/MM3 (ref 1.7–7)
NEUTROPHILS NFR BLD AUTO: 76.6 % (ref 42.7–76)
NRBC BLD AUTO-RTO: 0 /100 WBC (ref 0–0.2)
PLATELET # BLD AUTO: 302 10*3/MM3 (ref 140–450)
PMV BLD AUTO: 10.3 FL (ref 6–12)
POTASSIUM BLD-SCNC: 3.9 MMOL/L (ref 3.5–5.2)
RBC # BLD AUTO: 4.59 10*6/MM3 (ref 3.77–5.28)
SODIUM BLD-SCNC: 140 MMOL/L (ref 136–145)
WBC NRBC COR # BLD: 12.03 10*3/MM3 (ref 3.4–10.8)

## 2020-01-13 PROCEDURE — 80048 BASIC METABOLIC PNL TOTAL CA: CPT

## 2020-01-13 PROCEDURE — 85025 COMPLETE CBC W/AUTO DIFF WBC: CPT

## 2020-01-13 PROCEDURE — 36415 COLL VENOUS BLD VENIPUNCTURE: CPT

## 2020-01-17 ENCOUNTER — HOSPITAL ENCOUNTER (OUTPATIENT)
Facility: HOSPITAL | Age: 75
Discharge: HOME | End: 2020-01-18
Attending: INTERNAL MEDICINE | Admitting: INTERNAL MEDICINE

## 2020-01-17 DIAGNOSIS — R94.39 ABNORMAL STRESS TEST: ICD-10-CM

## 2020-01-17 DIAGNOSIS — Z95.5 S/P DRUG ELUTING CORONARY STENT PLACEMENT: ICD-10-CM

## 2020-01-17 DIAGNOSIS — I25.118 CORONARY ARTERY DISEASE OF NATIVE ARTERY OF NATIVE HEART WITH STABLE ANGINA PECTORIS (HCC): Primary | ICD-10-CM

## 2020-01-17 LAB
ACT BLD: 197 SECONDS (ref 82–152)
ACT BLD: 263 SECONDS (ref 82–152)

## 2020-01-17 PROCEDURE — C1887 CATHETER, GUIDING: HCPCS | Performed by: INTERNAL MEDICINE

## 2020-01-17 PROCEDURE — G0378 HOSPITAL OBSERVATION PER HR: HCPCS

## 2020-01-17 PROCEDURE — C1894 INTRO/SHEATH, NON-LASER: HCPCS | Performed by: INTERNAL MEDICINE

## 2020-01-17 PROCEDURE — 93005 ELECTROCARDIOGRAM TRACING: CPT | Performed by: INTERNAL MEDICINE

## 2020-01-17 PROCEDURE — C1769 GUIDE WIRE: HCPCS | Performed by: INTERNAL MEDICINE

## 2020-01-17 PROCEDURE — C9600 PERC DRUG-EL COR STENT SING: HCPCS | Performed by: INTERNAL MEDICINE

## 2020-01-17 PROCEDURE — 92928 PRQ TCAT PLMT NTRAC ST 1 LES: CPT | Performed by: INTERNAL MEDICINE

## 2020-01-17 PROCEDURE — 0 IOPAMIDOL PER 1 ML: Performed by: INTERNAL MEDICINE

## 2020-01-17 PROCEDURE — C1725 CATH, TRANSLUMIN NON-LASER: HCPCS | Performed by: INTERNAL MEDICINE

## 2020-01-17 PROCEDURE — 25010000002 MIDAZOLAM PER 1 MG: Performed by: INTERNAL MEDICINE

## 2020-01-17 PROCEDURE — 93458 L HRT ARTERY/VENTRICLE ANGIO: CPT | Performed by: INTERNAL MEDICINE

## 2020-01-17 PROCEDURE — C1874 STENT, COATED/COV W/DEL SYS: HCPCS | Performed by: INTERNAL MEDICINE

## 2020-01-17 PROCEDURE — 25010000002 HEPARIN (PORCINE) PER 1000 UNITS: Performed by: INTERNAL MEDICINE

## 2020-01-17 PROCEDURE — 99152 MOD SED SAME PHYS/QHP 5/>YRS: CPT | Performed by: INTERNAL MEDICINE

## 2020-01-17 PROCEDURE — 85347 COAGULATION TIME ACTIVATED: CPT

## 2020-01-17 PROCEDURE — 99153 MOD SED SAME PHYS/QHP EA: CPT | Performed by: INTERNAL MEDICINE

## 2020-01-17 PROCEDURE — 25010000002 DIPHENHYDRAMINE PER 50 MG: Performed by: INTERNAL MEDICINE

## 2020-01-17 PROCEDURE — 25010000002 FENTANYL CITRATE (PF) 100 MCG/2ML SOLUTION: Performed by: INTERNAL MEDICINE

## 2020-01-17 PROCEDURE — 93010 ELECTROCARDIOGRAM REPORT: CPT | Performed by: INTERNAL MEDICINE

## 2020-01-17 DEVICE — XIENCE SIERRA™ EVEROLIMUS ELUTING CORONARY STENT SYSTEM 2.25 MM X 08 MM / RAPID-EXCHANGE
Type: IMPLANTABLE DEVICE | Status: FUNCTIONAL
Brand: XIENCE SIERRA™

## 2020-01-17 DEVICE — XIENCE SIERRA™ EVEROLIMUS ELUTING CORONARY STENT SYSTEM 2.25 MM X 33 MM / RAPID-EXCHANGE
Type: IMPLANTABLE DEVICE | Status: FUNCTIONAL
Brand: XIENCE SIERRA™

## 2020-01-17 RX ORDER — CLOPIDOGREL BISULFATE 75 MG/1
TABLET ORAL AS NEEDED
Status: DISCONTINUED | OUTPATIENT
Start: 2020-01-17 | End: 2020-01-17 | Stop reason: HOSPADM

## 2020-01-17 RX ORDER — DOCUSATE SODIUM 100 MG/1
100 CAPSULE, LIQUID FILLED ORAL 2 TIMES DAILY PRN
Status: DISCONTINUED | OUTPATIENT
Start: 2020-01-17 | End: 2020-01-18 | Stop reason: HOSPADM

## 2020-01-17 RX ORDER — ASPIRIN 81 MG/1
81 TABLET ORAL DAILY
Status: DISCONTINUED | OUTPATIENT
Start: 2020-01-18 | End: 2020-01-18 | Stop reason: HOSPADM

## 2020-01-17 RX ORDER — FENTANYL CITRATE 50 UG/ML
INJECTION, SOLUTION INTRAMUSCULAR; INTRAVENOUS AS NEEDED
Status: DISCONTINUED | OUTPATIENT
Start: 2020-01-17 | End: 2020-01-17 | Stop reason: HOSPADM

## 2020-01-17 RX ORDER — ATORVASTATIN CALCIUM 20 MG/1
20 TABLET, FILM COATED ORAL DAILY
Status: DISCONTINUED | OUTPATIENT
Start: 2020-01-17 | End: 2020-01-18 | Stop reason: HOSPADM

## 2020-01-17 RX ORDER — SODIUM CHLORIDE 0.9 % (FLUSH) 0.9 %
10 SYRINGE (ML) INJECTION AS NEEDED
Status: DISCONTINUED | OUTPATIENT
Start: 2020-01-17 | End: 2020-01-17

## 2020-01-17 RX ORDER — SODIUM CHLORIDE 0.9 % (FLUSH) 0.9 %
3 SYRINGE (ML) INJECTION EVERY 12 HOURS SCHEDULED
Status: DISCONTINUED | OUTPATIENT
Start: 2020-01-17 | End: 2020-01-17

## 2020-01-17 RX ORDER — SODIUM CHLORIDE 9 MG/ML
100 INJECTION, SOLUTION INTRAVENOUS CONTINUOUS
Status: ACTIVE | OUTPATIENT
Start: 2020-01-17 | End: 2020-01-17

## 2020-01-17 RX ORDER — METOPROLOL SUCCINATE 50 MG/1
50 TABLET, EXTENDED RELEASE ORAL
Status: DISCONTINUED | OUTPATIENT
Start: 2020-01-17 | End: 2020-01-18 | Stop reason: HOSPADM

## 2020-01-17 RX ORDER — SODIUM CHLORIDE 0.9 % (FLUSH) 0.9 %
10 SYRINGE (ML) INJECTION AS NEEDED
Status: DISCONTINUED | OUTPATIENT
Start: 2020-01-17 | End: 2020-01-18 | Stop reason: HOSPADM

## 2020-01-17 RX ORDER — DIPHENHYDRAMINE HYDROCHLORIDE 50 MG/ML
25 INJECTION INTRAMUSCULAR; INTRAVENOUS ONCE
Status: COMPLETED | OUTPATIENT
Start: 2020-01-17 | End: 2020-01-17

## 2020-01-17 RX ORDER — HYDROCODONE BITARTRATE AND ACETAMINOPHEN 5; 325 MG/1; MG/1
1 TABLET ORAL EVERY 4 HOURS PRN
Status: DISCONTINUED | OUTPATIENT
Start: 2020-01-17 | End: 2020-01-18 | Stop reason: HOSPADM

## 2020-01-17 RX ORDER — ASPIRIN 81 MG/1
81 TABLET ORAL DAILY
Qty: 90 TABLET | Refills: 3 | Status: SHIPPED | OUTPATIENT
Start: 2020-01-17 | End: 2020-12-29 | Stop reason: SDUPTHER

## 2020-01-17 RX ORDER — AMLODIPINE BESYLATE 5 MG/1
5 TABLET ORAL DAILY
Status: DISCONTINUED | OUTPATIENT
Start: 2020-01-17 | End: 2020-01-18 | Stop reason: HOSPADM

## 2020-01-17 RX ORDER — LIDOCAINE HYDROCHLORIDE 20 MG/ML
INJECTION, SOLUTION INFILTRATION; PERINEURAL AS NEEDED
Status: DISCONTINUED | OUTPATIENT
Start: 2020-01-17 | End: 2020-01-17 | Stop reason: HOSPADM

## 2020-01-17 RX ORDER — ONDANSETRON 2 MG/ML
4 INJECTION INTRAMUSCULAR; INTRAVENOUS EVERY 6 HOURS PRN
Status: DISCONTINUED | OUTPATIENT
Start: 2020-01-17 | End: 2020-01-18 | Stop reason: HOSPADM

## 2020-01-17 RX ORDER — SODIUM CHLORIDE 0.9 % (FLUSH) 0.9 %
3 SYRINGE (ML) INJECTION EVERY 12 HOURS SCHEDULED
Status: DISCONTINUED | OUTPATIENT
Start: 2020-01-17 | End: 2020-01-18 | Stop reason: HOSPADM

## 2020-01-17 RX ORDER — CLOPIDOGREL BISULFATE 75 MG/1
75 TABLET ORAL DAILY
Qty: 90 TABLET | Refills: 3 | Status: SHIPPED | OUTPATIENT
Start: 2020-01-17 | End: 2020-12-28 | Stop reason: SDUPTHER

## 2020-01-17 RX ORDER — HEPARIN SODIUM 1000 [USP'U]/ML
INJECTION, SOLUTION INTRAVENOUS; SUBCUTANEOUS AS NEEDED
Status: DISCONTINUED | OUTPATIENT
Start: 2020-01-17 | End: 2020-01-17 | Stop reason: HOSPADM

## 2020-01-17 RX ORDER — ACETAMINOPHEN 325 MG/1
650 TABLET ORAL EVERY 4 HOURS PRN
Status: DISCONTINUED | OUTPATIENT
Start: 2020-01-17 | End: 2020-01-18 | Stop reason: HOSPADM

## 2020-01-17 RX ORDER — SODIUM CHLORIDE 9 MG/ML
75 INJECTION, SOLUTION INTRAVENOUS CONTINUOUS
Status: DISCONTINUED | OUTPATIENT
Start: 2020-01-17 | End: 2020-01-18 | Stop reason: HOSPADM

## 2020-01-17 RX ORDER — CLOPIDOGREL BISULFATE 75 MG/1
75 TABLET ORAL DAILY
Status: DISCONTINUED | OUTPATIENT
Start: 2020-01-18 | End: 2020-01-18 | Stop reason: HOSPADM

## 2020-01-17 RX ORDER — PANTOPRAZOLE SODIUM 40 MG/1
40 TABLET, DELAYED RELEASE ORAL EVERY MORNING
Status: DISCONTINUED | OUTPATIENT
Start: 2020-01-18 | End: 2020-01-18 | Stop reason: HOSPADM

## 2020-01-17 RX ORDER — ASPIRIN 325 MG
TABLET ORAL AS NEEDED
Status: DISCONTINUED | OUTPATIENT
Start: 2020-01-17 | End: 2020-01-17 | Stop reason: HOSPADM

## 2020-01-17 RX ORDER — ONDANSETRON 4 MG/1
4 TABLET, FILM COATED ORAL EVERY 6 HOURS PRN
Status: DISCONTINUED | OUTPATIENT
Start: 2020-01-17 | End: 2020-01-18 | Stop reason: HOSPADM

## 2020-01-17 RX ORDER — NITROGLYCERIN 0.4 MG/1
0.4 TABLET SUBLINGUAL
Qty: 25 TABLET | Refills: 3 | Status: SHIPPED | OUTPATIENT
Start: 2020-01-17

## 2020-01-17 RX ORDER — MIDAZOLAM HYDROCHLORIDE 1 MG/ML
INJECTION INTRAMUSCULAR; INTRAVENOUS AS NEEDED
Status: DISCONTINUED | OUTPATIENT
Start: 2020-01-17 | End: 2020-01-17 | Stop reason: HOSPADM

## 2020-01-17 RX ORDER — SENNA AND DOCUSATE SODIUM 50; 8.6 MG/1; MG/1
2 TABLET, FILM COATED ORAL 2 TIMES DAILY PRN
Status: DISCONTINUED | OUTPATIENT
Start: 2020-01-17 | End: 2020-01-18 | Stop reason: HOSPADM

## 2020-01-17 RX ORDER — TOPIRAMATE 25 MG/1
25 TABLET ORAL 2 TIMES DAILY
Status: DISCONTINUED | OUTPATIENT
Start: 2020-01-17 | End: 2020-01-18 | Stop reason: HOSPADM

## 2020-01-17 RX ADMIN — AMLODIPINE BESYLATE 5 MG: 5 TABLET ORAL at 21:35

## 2020-01-17 RX ADMIN — SODIUM CHLORIDE, PRESERVATIVE FREE 3 ML: 5 INJECTION INTRAVENOUS at 21:36

## 2020-01-17 RX ADMIN — ATORVASTATIN CALCIUM 20 MG: 20 TABLET, FILM COATED ORAL at 21:36

## 2020-01-17 RX ADMIN — SODIUM CHLORIDE 100 ML/HR: 9 INJECTION, SOLUTION INTRAVENOUS at 15:30

## 2020-01-17 RX ADMIN — HYDROCODONE BITARTRATE AND ACETAMINOPHEN 1 TABLET: 5; 325 TABLET ORAL at 22:31

## 2020-01-17 RX ADMIN — TOPIRAMATE 25 MG: 25 TABLET, FILM COATED ORAL at 21:35

## 2020-01-17 RX ADMIN — HYDROCODONE BITARTRATE AND ACETAMINOPHEN 1 TABLET: 5; 325 TABLET ORAL at 14:55

## 2020-01-17 RX ADMIN — SODIUM CHLORIDE 75 ML/HR: 9 INJECTION, SOLUTION INTRAVENOUS at 10:42

## 2020-01-17 RX ADMIN — DIPHENHYDRAMINE HYDROCHLORIDE 25 MG: 50 INJECTION, SOLUTION INTRAMUSCULAR; INTRAVENOUS at 10:42

## 2020-01-17 NOTE — CONSULTS
Met with patient and her , discussed benefits of cardiac rehab. Provided phase II information along with  the contact information for cardiac rehab here at [facility]. Patient asked if she could call and set up an appointment after she was discharged.

## 2020-01-17 NOTE — INTERVAL H&P NOTE
H&P reviewed. The patient was examined and there are no changes to the H&P.    Her stress test did come back with a new cardiomyopathy of 22%.  Unclear if this is artifact or gating however patient did have chest pain during her stress test and thus we are planning to get definitive information with a cardiac catheterization.

## 2020-01-18 VITALS
HEIGHT: 63 IN | SYSTOLIC BLOOD PRESSURE: 106 MMHG | OXYGEN SATURATION: 94 % | HEART RATE: 50 BPM | RESPIRATION RATE: 16 BRPM | TEMPERATURE: 97.3 F | WEIGHT: 122.14 LBS | BODY MASS INDEX: 21.64 KG/M2 | DIASTOLIC BLOOD PRESSURE: 59 MMHG

## 2020-01-18 LAB
ANION GAP SERPL CALCULATED.3IONS-SCNC: 11.1 MMOL/L (ref 5–15)
BUN BLD-MCNC: 19 MG/DL (ref 8–23)
BUN/CREAT SERPL: 32.2 (ref 7–25)
CALCIUM SPEC-SCNC: 8.3 MG/DL (ref 8.6–10.5)
CHLORIDE SERPL-SCNC: 109 MMOL/L (ref 98–107)
CO2 SERPL-SCNC: 21.9 MMOL/L (ref 22–29)
CREAT BLD-MCNC: 0.59 MG/DL (ref 0.57–1)
DEPRECATED RDW RBC AUTO: 38.5 FL (ref 37–54)
ERYTHROCYTE [DISTWIDTH] IN BLOOD BY AUTOMATED COUNT: 11.9 % (ref 12.3–15.4)
GFR SERPL CREATININE-BSD FRML MDRD: 100 ML/MIN/1.73
GLUCOSE BLD-MCNC: 87 MG/DL (ref 65–99)
HCT VFR BLD AUTO: 33.1 % (ref 34–46.6)
HGB BLD-MCNC: 11.3 G/DL (ref 12–15.9)
MCH RBC QN AUTO: 30.5 PG (ref 26.6–33)
MCHC RBC AUTO-ENTMCNC: 34.1 G/DL (ref 31.5–35.7)
MCV RBC AUTO: 89.2 FL (ref 79–97)
PLATELET # BLD AUTO: 246 10*3/MM3 (ref 140–450)
PMV BLD AUTO: 10.3 FL (ref 6–12)
POTASSIUM BLD-SCNC: 3.6 MMOL/L (ref 3.5–5.2)
RBC # BLD AUTO: 3.71 10*6/MM3 (ref 3.77–5.28)
SODIUM BLD-SCNC: 142 MMOL/L (ref 136–145)
WBC NRBC COR # BLD: 11.94 10*3/MM3 (ref 3.4–10.8)

## 2020-01-18 PROCEDURE — 99217 PR OBSERVATION CARE DISCHARGE MANAGEMENT: CPT | Performed by: NURSE PRACTITIONER

## 2020-01-18 PROCEDURE — 85027 COMPLETE CBC AUTOMATED: CPT | Performed by: INTERNAL MEDICINE

## 2020-01-18 PROCEDURE — 80048 BASIC METABOLIC PNL TOTAL CA: CPT | Performed by: INTERNAL MEDICINE

## 2020-01-18 PROCEDURE — 93005 ELECTROCARDIOGRAM TRACING: CPT | Performed by: INTERNAL MEDICINE

## 2020-01-18 PROCEDURE — 93010 ELECTROCARDIOGRAM REPORT: CPT | Performed by: INTERNAL MEDICINE

## 2020-01-18 PROCEDURE — G0378 HOSPITAL OBSERVATION PER HR: HCPCS

## 2020-01-18 RX ORDER — PANTOPRAZOLE SODIUM 40 MG/1
40 TABLET, DELAYED RELEASE ORAL EVERY MORNING
Qty: 30 TABLET | Refills: 0 | Status: SHIPPED | OUTPATIENT
Start: 2020-01-19 | End: 2020-01-21

## 2020-01-18 RX ORDER — METOPROLOL SUCCINATE 50 MG/1
50 TABLET, EXTENDED RELEASE ORAL
Qty: 30 TABLET | Refills: 0 | Status: SHIPPED | OUTPATIENT
Start: 2020-01-19 | End: 2020-01-21

## 2020-01-18 RX ADMIN — PANTOPRAZOLE SODIUM 40 MG: 40 TABLET, DELAYED RELEASE ORAL at 06:38

## 2020-01-18 RX ADMIN — CLOPIDOGREL 75 MG: 75 TABLET, FILM COATED ORAL at 08:29

## 2020-01-18 RX ADMIN — ATORVASTATIN CALCIUM 20 MG: 20 TABLET, FILM COATED ORAL at 08:30

## 2020-01-18 RX ADMIN — SODIUM CHLORIDE, PRESERVATIVE FREE 3 ML: 5 INJECTION INTRAVENOUS at 08:31

## 2020-01-18 RX ADMIN — METOPROLOL SUCCINATE 50 MG: 50 TABLET, FILM COATED, EXTENDED RELEASE ORAL at 08:29

## 2020-01-18 RX ADMIN — ASPIRIN 81 MG: 81 TABLET, COATED ORAL at 08:27

## 2020-01-18 RX ADMIN — TOPIRAMATE 25 MG: 25 TABLET, FILM COATED ORAL at 08:29

## 2020-01-18 NOTE — PLAN OF CARE
Problem: Patient Care Overview  Goal: Plan of Care Review  Outcome: Ongoing (interventions implemented as appropriate)  Flowsheets (Taken 1/18/2020 0620)  Progress: improving  Plan of Care Reviewed With: patient  Outcome Summary: Vitals stable. No falls. Pt c/o pain in chest, medicated per MAR. Right radial cath site C/D/I. Possible d/c today. Resting comfortably. Monitoring closely.     Problem: Cardiac Catheterization (Diagnostic/Interventional) (Adult)  Goal: Signs and Symptoms of Listed Potential Problems Will be Absent, Minimized or Managed (Cardiac Catheterization)  Outcome: Ongoing (interventions implemented as appropriate)  Flowsheets (Taken 1/18/2020 0620)  Problems Assessed (Cardiac Catheterization): all  Problems Present (Cardiac Cath): none

## 2020-01-18 NOTE — DISCHARGE SUMMARY
[facility]      Patient Name: Della Vines  :1945  74 y.o.  LOS: 0  Encounter Provider: Josie Rai, ARAMIS, APRN      Date of Admission: 2020  Date of Discharge:  2020      Treatment Team:  Patient Care Team:  Josep Kelley MD as PCP - General (Internal Medicine)  Provider, No Known as PCP - Family Medicine      Discharge Diagnosis:    Abnormal stress test    Coronary artery disease of native artery of native heart with stable angina pectoris (CMS/[facility])      History of Present Illness:  Della Vines is a 74 y.o. female who was admitted on 2020 after heart cath    Hospital Course:   Patient was seen in the office as a new patient 2019 by Dr. Zimmerman.  At this point she had symptoms concerning for ischemia.  Stress test 2020 showed normal myocardial perfusion study with no evidence of ischemia.  However patient complained of 8 out of 10 burning chest pressure and burning sensation with exertion.  EF was felt to be severely reduced at 22%.  Heart catheterization was recommended.  Patient underwent heart catheterization 2020 by Dr. Zimmerman using the right radial approach.  It showed a right dominant system with left main having 30% distal stenosis, proximal LAD containing 30 to 40% ostial segment stenosis followed by a 70% stenosis which was a long segment of disease which tapered down to a 90% stenosis in the mid LAD extending to the distal LAD.  Circumflex artery was small with ostial containing 40% stenosis followed by luminal irregularities throughout.  Right coronary artery was small caliber with scattered 20% stenosis.  Luminal irregularities of the posterior descending artery and posterior lateral branches seen as well.  Patient underwent successful revascularization of the proximal to mid LAD with placement of overlapping 2.25 x 33 mm and 2.25 x 8 mm Xience drug-eluting stents with the mid and proximal segments postdilated.  Left ventricular  systolic function was felt to be normal at 60% on heart cath.  Dual antiplatelet therapy was recommended with continued medical therapy with beta-blocker and calcium channel blocker and statin and referral to cardiac rehab.  Patient did well post procedurally.  She is feeling well this morning and feels ready for discharge.  Radial site appears well without bleeding or hematoma.  Pulses intact.  She is able to walk around the nurses station this morning without any chest discomfort symptoms.  Discussed recommendations to proceed with cardiac rehab and she is on board.  Orders have been placed.  We will go ahead and discharge today.  Patient to follow-up with Dr. Zimmerman in the office within a month and nurse practitioner within a week.  Her blood pressure has been low normal and her losartan/hydrochlorothiazide was held last night.  Will hold this at discharge but discussed need to monitor blood pressure at home and resume if blood pressures become elevated.  She will call the office for high or low blood pressure readings or other issues prior to follow-up.      Objective:  Temp:  [97.3 °F (36.3 °C)-98.2 °F (36.8 °C)] 97.3 °F (36.3 °C)  Heart Rate:  [50-77] 50  Resp:  [16-18] 16  BP: ()/() 106/59    Intake/Output Summary (Last 24 hours) at 1/18/2020 1135  Last data filed at 1/18/2020 0639  Gross per 24 hour   Intake 780 ml   Output 550 ml   Net 230 ml     Body mass index is 21.64 kg/m².      01/17/20  1038 01/17/20  1531   Weight: 53.1 kg (117 lb) 55.4 kg (122 lb 2.2 oz)       Physical Exam   Constitutional: She is oriented to person, place, and time. She appears well-developed and well-nourished. No distress.   Cardiovascular: Normal rate, regular rhythm, normal heart sounds and intact distal pulses.   No murmur heard.  Pulmonary/Chest: Effort normal and breath sounds normal. No respiratory distress.   Abdominal: Soft. Bowel sounds are normal. She exhibits no distension. There is no tenderness.      Musculoskeletal: Normal range of motion. She exhibits no edema or tenderness.   Neurological: She is alert and oriented to person, place, and time.   Skin: Skin is warm and dry. No erythema.   Right wrist where heart catheterization was done appears within normal limits with mild bruising present.  No bleeding or hematoma present.  Peripheral vascular status intact with normal pulses distally and proximally and normal cap refill.   Psychiatric: She has a normal mood and affect.         Procedures Performed:  Procedure(s):  Left Heart Cath  Coronary angiography  Left ventriculography  Percutaneous Coronary Intervention  Stent GERA coronary         Pertinent Test Results:  Results from last 7 days   Lab Units 01/18/20  0416 01/13/20  1525   SODIUM mmol/L 142 140   POTASSIUM mmol/L 3.6 3.9   CHLORIDE mmol/L 109* 101   CO2 mmol/L 21.9* 24.2   BUN mg/dL 19 13   CREATININE mg/dL 0.59 0.75   GLUCOSE mg/dL 87 100*   CALCIUM mg/dL 8.3* 9.8         Results from last 7 days   Lab Units 01/18/20  0416 01/13/20  1525   WBC 10*3/mm3 11.94* 12.03*   HEMOGLOBIN g/dL 11.3* 13.9   HEMATOCRIT % 33.1* 40.5   PLATELETS 10*3/mm3 246 302                   Invalid input(s): LDLCALC            Discharge Diet:    Dietary Orders (From admission, onward)     Start     Ordered    01/17/20 1428  Diet Regular; Cardiac  Diet Effective Now     Question Answer Comment   Diet Texture / Consistency Regular    Common Modifiers Cardiac        01/17/20 1427                Activity at Discharge:    Activity Instructions     Activity as tolerated                Discharge disposition: Stable.  Ambulatory.  Ready for discharge.  Home with family.      Discharge Medications:     Discharge Medications      New Medications      Instructions Start Date   aspirin 81 MG EC tablet   81 mg, Oral, Daily      clopidogrel 75 MG tablet  Commonly known as:  PLAVIX   75 mg, Oral, Daily      nitroglycerin 0.4 MG SL tablet  Commonly known as:  NITROSTAT   0.4 mg,  Sublingual, Every 5 Minutes PRN, Place one tablet under tongue as needed for chest pain.      pantoprazole 40 MG EC tablet  Commonly known as:  PROTONIX  Replaces:  omeprazole 20 MG capsule   40 mg, Oral, Every Morning   Start Date:  January 19, 2020        Changes to Medications      Instructions Start Date   metoprolol succinate XL 50 MG 24 hr tablet  Commonly known as:  TOPROL-XL  What changed:    · how much to take  · how to take this  · when to take this   50 mg, Oral, Every 24 Hours Scheduled   Start Date:  January 19, 2020        Continue These Medications      Instructions Start Date   amLODIPine 5 MG tablet  Commonly known as:  NORVASC   5 mg, Oral, Daily      atorvastatin 20 MG tablet  Commonly known as:  LIPITOR   20 mg, Oral, Daily      calcium carbonate 1250 (500 Ca) MG tablet  Commonly known as:  OS-JERRELL   1 tablet, Oral      Cholecalciferol 50 MCG (2000 UT) tablet   4,000 Int'l Units, Oral      topiramate 25 MG tablet  Commonly known as:  TOPAMAX   TK 1 T PO BID         Stop These Medications    ibuprofen 800 MG tablet  Commonly known as:  ADVIL,MOTRIN     OMEGA-3 FATTY ACIDS PO     omeprazole 20 MG capsule  Commonly known as:  priLOSEC  Replaced by:  pantoprazole 40 MG EC tablet     telmisartan-hydrochlorothiazide 80-12.5 MG per tablet  Commonly known as:  MICARDIS HCT     vitamin E 400 UNIT capsule              Follow-up:  Follow-up Information     [facility] .    Specialty:  Cardiac Rehabilitation  Contact information:  [address] [facility] [address] [phone]           Josep Kelley MD .    Specialty:  Internal Medicine  Contact information:  [address] [id number] [phone]             [facility] .    Specialty:  Cardiac Rehabilitation  Contact information:  [address] [facility] [address] [phone]           Kingsley Zimmerman MD. Schedule an appointment as soon as possible for a visit  in 1 month(s).    Specialty:  Cardiology  Contact information:  [address] [facility]  [address]  [place] [id number] [phone]             Zoey Morgan APRN Student. Schedule an appointment as soon as possible for a visit in 1 week(s).                 Future Appointments   Date Time Provider Department Center   3/26/2020 11:15 AM Kingsley Zimmerman MD [facility] None     Additional Instructions for the Follow-ups that You Need to Schedule     Ambulatory Referral to Cardiac Rehab   As directed      Ambulatory Referral to Cardiac Rehab   As directed                Josie Rai DNP, APRN  01/18/20  11:35 AM    EMR Dragon/Transcription disclaimer:   Much of this encounter note is an electronic transcription/translation of spoken language to printed text. The electronic translation of spoken language may permit erroneous, or at times, nonsensical words or phrases to be inadvertently transcribed; Although I have reviewed the note for such errors, some may still exist.

## 2020-01-18 NOTE — DISCHARGE INSTRUCTIONS
MONITOR BP AT HOME AT LEAST 1-2 HOURS AFTER AM MEDS AND AFTER RESTING CALMLY 10-15 MINUTES.  KEEP LOG AND BRING TO 1 WEEK FOLLOW-UP APPOINTMENT.     BP GOAL:  100-130/60-80    HOLD AMLODIPINE FOR TOP BP # LESS THAN 100 AND CONTACT OFFICE.    WATCH FOR SIGNS OF DIZZINESS/ LIGHTHEADEDNESS WHICH CAN INDICATED LOW BP.     CALL OFFICE IF BP STAYING ABOVE 130/80.

## 2020-01-21 RX ORDER — METOPROLOL SUCCINATE 50 MG/1
TABLET, EXTENDED RELEASE ORAL
Qty: 90 TABLET | Refills: 3 | Status: SHIPPED | OUTPATIENT
Start: 2020-01-21 | End: 2022-03-23 | Stop reason: SDUPTHER

## 2020-01-21 RX ORDER — PANTOPRAZOLE SODIUM 40 MG/1
40 TABLET, DELAYED RELEASE ORAL EVERY MORNING
Qty: 90 TABLET | Refills: 3 | Status: SHIPPED | OUTPATIENT
Start: 2020-01-21

## 2020-01-27 ENCOUNTER — OFFICE VISIT (OUTPATIENT)
Dept: CARDIOLOGY | Facility: CLINIC | Age: 75
End: 2020-01-27

## 2020-01-27 VITALS
DIASTOLIC BLOOD PRESSURE: 66 MMHG | SYSTOLIC BLOOD PRESSURE: 128 MMHG | OXYGEN SATURATION: 93 % | BODY MASS INDEX: 20.8 KG/M2 | HEIGHT: 63 IN | WEIGHT: 117.4 LBS | RESPIRATION RATE: 18 BRPM | HEART RATE: 58 BPM

## 2020-01-27 DIAGNOSIS — E78.2 MIXED HYPERLIPIDEMIA: ICD-10-CM

## 2020-01-27 DIAGNOSIS — I10 ESSENTIAL HYPERTENSION: ICD-10-CM

## 2020-01-27 DIAGNOSIS — I25.10 CORONARY ARTERY DISEASE INVOLVING NATIVE CORONARY ARTERY OF NATIVE HEART WITHOUT ANGINA PECTORIS: Primary | ICD-10-CM

## 2020-01-27 PROCEDURE — 99214 OFFICE O/P EST MOD 30 MIN: CPT | Performed by: NURSE PRACTITIONER

## 2020-01-27 PROCEDURE — 93000 ELECTROCARDIOGRAM COMPLETE: CPT | Performed by: NURSE PRACTITIONER

## 2020-01-27 RX ORDER — ATORVASTATIN CALCIUM 40 MG/1
40 TABLET, FILM COATED ORAL DAILY
Qty: 30 TABLET | Refills: 11 | Status: SHIPPED | OUTPATIENT
Start: 2020-01-27 | End: 2021-02-05

## 2020-01-27 NOTE — PROGRESS NOTES
Date of Office Visit: 2020  Encounter Provider: WALTER Carter  Place of Service: [facility] CARDIOLOGY  Patient Name: Della Vines  :1945    Chief Complaint   Patient presents with   • Coronary Artery Disease     1 [facility] HOSP FOLLOW UP   :     HPI: Della Vines is a 74 y.o. female, new to me, who presents today for follow-up.  Old records have been obtained and reviewed by me.  She is a patient with a past medical history significant for TIAs, hypertension, hyperlipidemia, and chronic lung disease.  She was first evaluated in our office by Dr. Zimmerman on 2019.  She presented with reports of an episode of chest tightness and shortness of breath as well as uncontrolled hypertension.  In regards to the blood pressure, she was started on amlodipine.  Dr. Zimmerman wanted to further evaluate the chest pain and shortness of breath with a stress test.  Additionally, with her recent new episode of neurologic changes concerning for TIA, Dr. Zimmerman wanted to further evaluate with carotid Dopplers well as a 2-week monitor.  Carotid Doppler revealed mild stenosis of the distal right ICA and the mid left ICA.  The Holter monitor was normal revealing sinus rhythm with an average rate of 86.  The stress test revealed no evidence of ischemia but a severely reduced ejection fraction of 22%.  Additionally, she reported burning chest pressure and sensation with exertion.     Ultimately, she underwent cardiac catheterization On 2020.  This revealed 30% distal left main, 30 to 40% ostial segment stenosis of the proximal LAD followed by 70% stenosis down to a 90% stenosis in the midsegment, 40% ostial stenosis of the circumflex, and 20% scattered stenosis throughout the proximal, mid, and distal RCA.  She underwent successful PCI of the proximal to mid LAD with placement of overlapping 2.25 x 33 mm and 2.25 x 8 mm Xience Shanon drug-eluting stents postdilated with a  noncompliant 2.5 mm balloon.  Recommendations were for dual antiplatelet therapy for at least 1 year.  Her blood pressure was low normal and her telmisartan/hydrochlorothiazide was held.  On 1/18/2020, she was stable for discharge and I am seeing her today for follow-up.   Since being discharged from the hospital, she has been doing well from a cardiac standpoint.  She denies any chest pain, shortness of breath, edema, dizziness, or syncope.  She denies any bleeding difficulties or melena. She reports a few episodes of palpitations but nothing sustained.  She also reports an occasional burning sensation in the middle of her chest that lasts for only a couple of seconds.  Her biggest complaint today is fatigue.  She also brought in a blood pressure log from home which reveals her blood pressure has been well controlled mostly in the 120s systolic and 80s diastolic.  She has been hesitant to return to much physical activity.  She does have her initial assessment at cardiac rehab on Thursday.  She has been more mindful of her diet choices especially when it comes to the sodium.      Past Medical History:   Diagnosis Date   • Acid reflux disease    • Bell's palsy    • Cerebellar infarction (CMS/HCC)    • Chest pain    • Hyperlipidemia    • Hypertension    • Lung disease    • Osteoporosis    • PAF (paroxysmal atrial fibrillation) (CMS/HCC)    • Stroke (CMS/HCC)    • TIA (transient ischemic attack)    • Urinary tract infection        Past Surgical History:   Procedure Laterality Date   • ADENOIDECTOMY     • APPENDECTOMY     • BREAST FIBROADENOMA SURGERY     • CARDIAC CATHETERIZATION N/A 1/17/2020    Procedure: Left Heart Cath;  Surgeon: Kingsley Zimmerman MD;  Location:  [facility];  Service: Cardiovascular   • CARDIAC CATHETERIZATION N/A 1/17/2020    Procedure: Coronary angiography;  Surgeon: Kingsley Zimmerman MD;  Location:  [facility];  Service: Cardiovascular   • CARDIAC  CATHETERIZATION N/A 2020    Procedure: Left ventriculography;  Surgeon: Kingsley Zimmerman MD;  Location:  [facility];  Service: Cardiovascular   • CARDIAC CATHETERIZATION N/A 2020    Procedure: Percutaneous Coronary Intervention;  Surgeon: Kingsley Zimmerman MD;  Location:  [facility];  Service: Cardiovascular   • CARDIAC CATHETERIZATION N/A 2020    Procedure: Stent GERA coronary;  Surgeon: Kingsley Zimmerman MD;  Location:  [facility];  Service: Cardiovascular   • HYSTERECTOMY     • MASTOID SURGERY     • TONSILLECTOMY         Social History     Socioeconomic History   • Marital status:      Spouse name: Not on file   • Number of children: Not on file   • Years of education: Not on file   • Highest education level: Not on file   Tobacco Use   • Smoking status: Former Smoker     Packs/day: 1.50     Years: 30.00     Pack years: 45.00     Last attempt to quit:      Years since quittin.0   • Smokeless tobacco: Never Used   • Tobacco comment: caffeine use: 1 CUP COFFEE DAILY    Substance and Sexual Activity   • Alcohol use: No   • Drug use: No   • Sexual activity: Defer       Family History   Problem Relation Age of Onset   • Heart failure Father    • Hypertension Father    • Heart disease Father    • Breast cancer Mother    • Aneurysm Daughter        Review of Systems   Constitution: Positive for malaise/fatigue. Negative for chills and fever.   Cardiovascular: Positive for palpitations. Negative for chest pain, dyspnea on exertion, leg swelling, near-syncope, orthopnea, paroxysmal nocturnal dyspnea and syncope.   Respiratory: Negative for cough and shortness of breath.    Musculoskeletal: Negative for joint pain, joint swelling and myalgias.   Gastrointestinal: Negative for abdominal pain, diarrhea, melena, nausea and vomiting.   Genitourinary: Negative for frequency and hematuria.   Neurological: Negative for light-headedness,  "numbness, paresthesias and seizures.   Allergic/Immunologic: Negative.    All other systems reviewed and are negative.      Allergies   Allergen Reactions   • Codeine    • Erythromycin Diarrhea     Side effect    • Iodine Hives   • Lisinopril Other (See Comments)     COUGH   • Moxifloxacin      Elevated liver enzymes   • Sulfa Antibiotics          Current Outpatient Medications:   •  amLODIPine (NORVASC) 5 MG tablet, Take 1 tablet by mouth Daily., Disp: 90 tablet, Rfl: 3  •  aspirin 81 MG EC tablet, Take 1 tablet by mouth Daily., Disp: 90 tablet, Rfl: 3  •  atorvastatin (LIPITOR) 40 MG tablet, Take 1 tablet by mouth Daily., Disp: 30 tablet, Rfl: 11  •  Cholecalciferol 2000 units tablet, Take 4,000 Int'l Units by mouth., Disp: , Rfl:   •  clopidogrel (PLAVIX) 75 MG tablet, Take 1 tablet by mouth Daily., Disp: 90 tablet, Rfl: 3  •  metoprolol succinate XL (TOPROL-XL) 50 MG 24 hr tablet, TAKE 1 TABLET BY MOUTH EVERY DAY, Disp: 90 tablet, Rfl: 3  •  nitroglycerin (NITROSTAT) 0.4 MG SL tablet, Place 1 tablet under the tongue Every 5 (Five) Minutes As Needed for Chest Pain. Place one tablet under tongue as needed for chest pain., Disp: 25 tablet, Rfl: 3  •  pantoprazole (PROTONIX) 40 MG EC tablet, TAKE 1 TABLET BY MOUTH EVERY MORNING, Disp: 90 tablet, Rfl: 3  •  topiramate (TOPAMAX) 25 MG tablet, TK 1 T PO BID, Disp: , Rfl: 3  •  calcium carbonate (OS-JERRELL) 1250 (500 Ca) MG tablet, Take 1 tablet by mouth., Disp: , Rfl:       Objective:     Vitals:    01/27/20 1450 01/27/20 1458   BP: 136/70 128/66   BP Location: Right arm Left arm   Patient Position: Sitting Sitting   Pulse: 58    Resp: 18    SpO2: 93%    Weight: 53.3 kg (117 lb 6.4 oz)    Height: 160 cm (63\")      Body mass index is 20.8 kg/m².    PHYSICAL EXAM:    Physical Exam   Constitutional: She is oriented to person, place, and time. She appears well-developed and well-nourished. No distress.   HENT:   Head: Normocephalic and atraumatic.   Eyes: Pupils are equal, " round, and reactive to light.   Neck: No JVD present. No thyromegaly present.   Cardiovascular: Normal rate, regular rhythm, normal heart sounds and intact distal pulses.   No murmur heard.  Pulmonary/Chest: Effort normal and breath sounds normal. No respiratory distress.   Abdominal: Soft. Bowel sounds are normal. She exhibits no distension. There is no splenomegaly or hepatomegaly. There is no tenderness.   Musculoskeletal: Normal range of motion. She exhibits no edema.   Neurological: She is alert and oriented to person, place, and time.   Skin: Skin is warm and dry. She is not diaphoretic. No erythema.   Radial site well healed without erythema or edema. Proximal and distal pulses palpable. Good capillary refill.  She does have a small nodule that is non tender.  No bruit heard.   Psychiatric: She has a normal mood and affect. Her behavior is normal. Judgment normal.         ECG 12 Lead  Date/Time: 1/27/2020 3:09 PM  Performed by: Zoey Flower APRN  Authorized by: Zoey Flower APRN   Comparison: compared with previous ECG from 1/18/2020  Similar to previous ECG  Rhythm: sinus bradycardia  Rate: bradycardic  BPM: 53  T inversion: aVL, V1 and V2    Clinical impression: abnormal EKG  Comments: Indication: CAD              Assessment:       Diagnosis Plan   1. Coronary artery disease involving native coronary artery of native heart without angina pectoris  ECG 12 Lead   2. Essential hypertension     3. Mixed hyperlipidemia       Orders Placed This Encounter   Procedures   • ECG 12 Lead     This order was created via procedure documentation          Plan:       1.  Coronary artery disease.  She denies any angina.  She is status post drug-eluting stent placement to the proximal to mid LAD with overlapping drug-eluting stents.  Recommendations were for dual antiplatelet therapy for 1 year.  She has her initial assessment at cardiac rehab on 1/30/2020.      2.  Hypertension.  Her  telmisartan-hydrochlorothiazide was stopped in the hospital due to low blood pressure.  Her blood pressure today looks great.  Continue current regimen.      3.  Hyperlipidemia.  She is on atorvastatin 20 mg.  She needs to be on high-dose statin therapy, and I am going to increase her dose to 40 mg.  She will need a repeat lipid panel and LFTs in 3 months.      Overall, I think she is doing well from a cardiac standpoint.  She denies any symptoms of angina or heart failure.  She is tolerating medical therapy.  She is going to feel better when she gets into cardiac rehab.  She will follow-up with Dr. Zimmerman on 2/26/2020 or sooner if needed.      As always, it has been a pleasure to participate in your patient's care.      Sincerely,         WALTER Malone

## 2020-01-30 ENCOUNTER — OFFICE VISIT (OUTPATIENT)
Dept: CARDIAC REHAB | Facility: HOSPITAL | Age: 75
End: 2020-01-30

## 2020-01-30 VITALS
DIASTOLIC BLOOD PRESSURE: 64 MMHG | HEIGHT: 63 IN | SYSTOLIC BLOOD PRESSURE: 120 MMHG | WEIGHT: 116 LBS | BODY MASS INDEX: 20.55 KG/M2 | OXYGEN SATURATION: 98 % | HEART RATE: 54 BPM

## 2020-01-30 DIAGNOSIS — Z95.5 S/P DRUG ELUTING CORONARY STENT PLACEMENT: Primary | ICD-10-CM

## 2020-01-30 PROCEDURE — 93797 PHYS/QHP OP CAR RHAB WO ECG: CPT

## 2020-01-30 NOTE — PROGRESS NOTES
Cardiac Rehab Initial Assessment      Name: Della Vines  :1945 Allergies:Codeine; Erythromycin; Iodine; Lisinopril; Moxifloxacin; and Sulfa antibiotics, morphine, pneumonia vaccine   MRN: [id number] 74 y.o. Physician: Josep Kelley MD   Primary Diagnosis:    Diagnosis Plan   1. S/P drug eluting coronary stent placement with 2 overlapping stents to prox and mid LAD     Event Date: 2020 Specialist: Dr. Kingsley Zimmerman   Secondary Diagnosis:  Risk Stratification: High Risk Note Author: Danika Wilson RN     Cardiovascular History: First heart event     EXERCISE AT HOME  no        EF: 60%      Source: cath 2020          Ambulatory Status:Independent  Ambulatory Fall Risk Assessed on Initial Visit: yes 6 Minute Walk Pre- Cardiac Rehab:  Distance:1108ft      RPE:3  Max. HR: 75       SPO2:97-98    MET: 2.6  MPH: 2.1             RPD: 0.5-1  Resting BP: 110/60 LA, 120/64 RA    Peak BP:126/74   Recovery BP: 110/72  Comments: Pt walked full 6 minutes without stopping.  Denies any problem with walk. Thinks pace is fine and could walk more minutes.        NUTRITION  Lipids:yes If yes, labs as follows;  Total: No components found for: CHOLESTEROL  HDL:   HDL Cholesterol   Date Value Ref Range Status   2019 39 (L) >49 mg/dL Final    Lipids continued:  LDL:  LDL Cholesterol    Date Value Ref Range Status   2019 75 0 - 100 mg/dL Final     Triglyceride: No components found for: TRIGLYCERIDE   Weight Management:                 Weight: 116 lb  Height: 63 in                                   BMI: Body mass index is 20.55 kg/m².  Waist Circumference: 33.5  inches   Alcohol Use: none Diabetes:No    Last HGBA1C with date if applicable:No components found for: A1C         SOCIAL HISTORY  Social History     Socioeconomic History   • Marital status:      Spouse name: Not on file   • Number of children: Not on file   • Years of education: Not on file   • Highest education level: Not on file   Tobacco Use  "  • Smoking status: Former Smoker     Packs/day: 1.50     Years: 30.00     Pack years: 45.00     Last attempt to quit:      Years since quittin.0   • Smokeless tobacco: Never Used   • Tobacco comment: caffeine use: 1 CUP COFFEE DAILY    Substance and Sexual Activity   • Alcohol use: No   • Drug use: No   • Sexual activity: Defer       Educational Level (choose one that applies) college graduate:  Associate Degree in Human Services.  Worked as chemical dependency counselor. 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  Smoked about 1 1/2 PPD.  Started at age 15.  Quit alcohol and marijuana in       Comorbidities: Cerebrovascular disease:  Pt says she was told by MD that CT of head showed multiple areas of TIAs. Pulmonary disease: Bronchiectasis.  Thinks she may have been diagnosed with hepatitis B, but not sure.  Used drugs in late  (heroin, dilaudid). GERD     PSYCHOSOCIAL  Clinical Depression: yes, pt says she was diagnosed with depression in the past.  Pt also says her family has said they thought she was \"fearful\" about doing anything or going anywhere since the heart stents. Pt says she doesn't think she is fearful but doesn't feel like doing much.  Denies a current depression.     Stress: yes     Assess presence or absence of depression using a valid screening tool: yes      PHYSICAL ASSESSMENT  Influenza vaccine: yes  Pneumococcal vaccine: no (allergic)          Angina: no    Describe angina scale of 0 - 4: 0 = none    Today are you having incisional pain? N/A. If, Yes, Scale:         Today are you having any other pain? No. If, Yes, Scale:      Diagnosed with " Hypertension:yes    Heart Sounds: S1 S2 regular with possible murmur     Lung Sounds: normal air entry, lungs clear to auscultation         Assessment: Very nice lady.  Alert and appropriate to discussion.  Orthopedic Problems: Middle back pain, no surgery.      Are you being hurt, hit, or frightened by anyone at home or in your life? no    Are you being neglected by a caregiver? N/A Shoulder flexibility/Range of motion: Average     Recommended arm activity: Any    Chair sit and reach within: 7 inches   Leg flexibility: Below average    Leg Strength/Balance/Five times sit to stand: 9 seconds.     Chose one: Average    Recommended stretching: Standing    Assessment: Skin warm, dry, pink    Family attends IA: no Time of arrival: 1340  Time of departure: 1505     Patient Goals: MET 3-4  Develop exercise program to achieve at least 150 minutes per week, join [employer].  Be able to perform all household chores.           1/30/2020  1:54 PM  Danika Wilson RN

## 2020-02-03 ENCOUNTER — TREATMENT (OUTPATIENT)
Dept: CARDIAC REHAB | Facility: HOSPITAL | Age: 75
End: 2020-02-03

## 2020-02-03 DIAGNOSIS — Z95.5 S/P DRUG ELUTING CORONARY STENT PLACEMENT: Primary | ICD-10-CM

## 2020-02-03 PROCEDURE — 93798 PHYS/QHP OP CAR RHAB W/ECG: CPT

## 2020-02-05 ENCOUNTER — TREATMENT (OUTPATIENT)
Dept: CARDIAC REHAB | Facility: HOSPITAL | Age: 75
End: 2020-02-05

## 2020-02-05 DIAGNOSIS — Z95.5 S/P DRUG ELUTING CORONARY STENT PLACEMENT: Primary | ICD-10-CM

## 2020-02-05 PROCEDURE — 93798 PHYS/QHP OP CAR RHAB W/ECG: CPT

## 2020-02-10 ENCOUNTER — TREATMENT (OUTPATIENT)
Dept: CARDIAC REHAB | Facility: HOSPITAL | Age: 75
End: 2020-02-10

## 2020-02-10 DIAGNOSIS — Z95.5 S/P DRUG ELUTING CORONARY STENT PLACEMENT: Primary | ICD-10-CM

## 2020-02-10 PROCEDURE — 93798 PHYS/QHP OP CAR RHAB W/ECG: CPT

## 2020-02-12 ENCOUNTER — TREATMENT (OUTPATIENT)
Dept: CARDIAC REHAB | Facility: HOSPITAL | Age: 75
End: 2020-02-12

## 2020-02-12 DIAGNOSIS — Z95.5 S/P DRUG ELUTING CORONARY STENT PLACEMENT: Primary | ICD-10-CM

## 2020-02-12 PROCEDURE — 93798 PHYS/QHP OP CAR RHAB W/ECG: CPT

## 2020-02-17 ENCOUNTER — TREATMENT (OUTPATIENT)
Dept: CARDIAC REHAB | Facility: HOSPITAL | Age: 75
End: 2020-02-17

## 2020-02-17 DIAGNOSIS — Z95.5 S/P DRUG ELUTING CORONARY STENT PLACEMENT: Primary | ICD-10-CM

## 2020-02-17 PROCEDURE — 93798 PHYS/QHP OP CAR RHAB W/ECG: CPT

## 2020-02-19 ENCOUNTER — TREATMENT (OUTPATIENT)
Dept: CARDIAC REHAB | Facility: HOSPITAL | Age: 75
End: 2020-02-19

## 2020-02-19 DIAGNOSIS — Z95.5 S/P DRUG ELUTING CORONARY STENT PLACEMENT: Primary | ICD-10-CM

## 2020-02-19 PROCEDURE — 93798 PHYS/QHP OP CAR RHAB W/ECG: CPT

## 2020-02-24 ENCOUNTER — TREATMENT (OUTPATIENT)
Dept: CARDIAC REHAB | Facility: HOSPITAL | Age: 75
End: 2020-02-24

## 2020-02-24 DIAGNOSIS — Z95.5 S/P DRUG ELUTING CORONARY STENT PLACEMENT: Primary | ICD-10-CM

## 2020-02-24 PROCEDURE — 93798 PHYS/QHP OP CAR RHAB W/ECG: CPT

## 2020-02-26 ENCOUNTER — OFFICE VISIT (OUTPATIENT)
Dept: CARDIOLOGY | Facility: CLINIC | Age: 75
End: 2020-02-26

## 2020-02-26 ENCOUNTER — TREATMENT (OUTPATIENT)
Dept: CARDIAC REHAB | Facility: HOSPITAL | Age: 75
End: 2020-02-26

## 2020-02-26 VITALS
BODY MASS INDEX: 20.38 KG/M2 | SYSTOLIC BLOOD PRESSURE: 112 MMHG | HEART RATE: 52 BPM | WEIGHT: 115 LBS | HEIGHT: 63 IN | DIASTOLIC BLOOD PRESSURE: 68 MMHG

## 2020-02-26 DIAGNOSIS — I10 ESSENTIAL HYPERTENSION: ICD-10-CM

## 2020-02-26 DIAGNOSIS — E78.2 MIXED HYPERLIPIDEMIA: ICD-10-CM

## 2020-02-26 DIAGNOSIS — Z95.5 S/P DRUG ELUTING CORONARY STENT PLACEMENT: Primary | ICD-10-CM

## 2020-02-26 DIAGNOSIS — I25.10 CORONARY ARTERY DISEASE INVOLVING NATIVE CORONARY ARTERY OF NATIVE HEART WITHOUT ANGINA PECTORIS: Primary | ICD-10-CM

## 2020-02-26 PROBLEM — R94.39 ABNORMAL STRESS TEST: Status: RESOLVED | Noted: 2020-01-13 | Resolved: 2020-02-26

## 2020-02-26 PROCEDURE — 93000 ELECTROCARDIOGRAM COMPLETE: CPT | Performed by: INTERNAL MEDICINE

## 2020-02-26 PROCEDURE — 99214 OFFICE O/P EST MOD 30 MIN: CPT | Performed by: INTERNAL MEDICINE

## 2020-02-26 PROCEDURE — 93798 PHYS/QHP OP CAR RHAB W/ECG: CPT

## 2020-03-02 ENCOUNTER — TREATMENT (OUTPATIENT)
Dept: CARDIAC REHAB | Facility: HOSPITAL | Age: 75
End: 2020-03-02

## 2020-03-02 DIAGNOSIS — Z95.5 S/P DRUG ELUTING CORONARY STENT PLACEMENT: Primary | ICD-10-CM

## 2020-03-02 PROCEDURE — 93798 PHYS/QHP OP CAR RHAB W/ECG: CPT

## 2020-03-04 ENCOUNTER — TREATMENT (OUTPATIENT)
Dept: CARDIAC REHAB | Facility: HOSPITAL | Age: 75
End: 2020-03-04

## 2020-03-04 DIAGNOSIS — Z95.5 S/P DRUG ELUTING CORONARY STENT PLACEMENT: Primary | ICD-10-CM

## 2020-03-04 PROCEDURE — 93798 PHYS/QHP OP CAR RHAB W/ECG: CPT

## 2020-03-04 NOTE — PROGRESS NOTES
Patient attended the heart education and heart healthy nutrition class today. See scanned session report.

## 2020-03-09 ENCOUNTER — TREATMENT (OUTPATIENT)
Dept: CARDIAC REHAB | Facility: HOSPITAL | Age: 75
End: 2020-03-09

## 2020-03-09 ENCOUNTER — TELEPHONE (OUTPATIENT)
Dept: CARDIOLOGY | Facility: CLINIC | Age: 75
End: 2020-03-09

## 2020-03-09 DIAGNOSIS — Z95.5 S/P DRUG ELUTING CORONARY STENT PLACEMENT: Primary | ICD-10-CM

## 2020-03-09 PROCEDURE — 93798 PHYS/QHP OP CAR RHAB W/ECG: CPT

## 2020-03-09 NOTE — TELEPHONE ENCOUNTER
Called and spoke with patient she is aware that she an either or. Patient stated that she is going to continue to go.

## 2020-03-09 NOTE — TELEPHONE ENCOUNTER
Still would be okay for her to go according to current recommendations however if patient concerned would be reasonable to defer at this time

## 2020-03-09 NOTE — TELEPHONE ENCOUNTER
Patient called today stating that she is doing cardiac rehab Mondays and Wednesdays. She wanted to ask Dr. Zimmerman is it ok for her to still go since the coronavirus is going around, and she has lung disease.    Patient can be reached at [phone]    Thanks

## 2020-03-11 ENCOUNTER — TREATMENT (OUTPATIENT)
Dept: CARDIAC REHAB | Facility: HOSPITAL | Age: 75
End: 2020-03-11

## 2020-03-11 DIAGNOSIS — Z95.5 S/P DRUG ELUTING CORONARY STENT PLACEMENT: Primary | ICD-10-CM

## 2020-03-11 PROCEDURE — 93798 PHYS/QHP OP CAR RHAB W/ECG: CPT

## 2020-04-24 ENCOUNTER — TELEPHONE (OUTPATIENT)
Dept: CARDIOLOGY | Facility: CLINIC | Age: 75
End: 2020-04-24

## 2020-04-24 DIAGNOSIS — I25.10 CORONARY ARTERY DISEASE INVOLVING NATIVE CORONARY ARTERY OF NATIVE HEART WITHOUT ANGINA PECTORIS: Primary | ICD-10-CM

## 2020-04-24 NOTE — TELEPHONE ENCOUNTER
She will need repeat labs at some point in the next few months since increasing the dose of atorvastatin.  I think waiting a few more months is appropriate, but I will go ahead and place the orders.

## 2020-05-05 ENCOUNTER — TELEPHONE (OUTPATIENT)
Dept: CARDIOLOGY | Facility: CLINIC | Age: 75
End: 2020-05-05

## 2020-05-05 NOTE — TELEPHONE ENCOUNTER
S/W pt re: ODR. Pt stated she goes to her PCP, Dr. Kelley Thursday and will have labs done there.    Aleksandr,  Will you please fax lab orders to pt's PCP?!    Thanks,  NANCY Paige

## 2020-06-22 ENCOUNTER — TELEPHONE (OUTPATIENT)
Dept: CARDIOLOGY | Facility: CLINIC | Age: 75
End: 2020-06-22

## 2020-06-22 NOTE — TELEPHONE ENCOUNTER
She cannot stop her aspirin or Plavix, she just had stent placement in January.  Her cleaning will need to be rescheduled.  She has a follow-up appointment with Dr. Zimmerman in August.  She can discuss it with him at that appointment.

## 2020-06-23 NOTE — TELEPHONE ENCOUNTER
Called and spoke with patient to let her know that she needs to be reschedule her dentist until she discuss this with Dr. Zimmerman in August. She verbally undrstands.

## 2020-07-18 ENCOUNTER — APPOINTMENT (OUTPATIENT)
Dept: GENERAL RADIOLOGY | Facility: HOSPITAL | Age: 75
End: 2020-07-18

## 2020-07-18 ENCOUNTER — HOSPITAL ENCOUNTER (EMERGENCY)
Facility: HOSPITAL | Age: 75
Discharge: HOME | End: 2020-07-18
Attending: EMERGENCY MEDICINE | Admitting: EMERGENCY MEDICINE

## 2020-07-18 VITALS
BODY MASS INDEX: 21.71 KG/M2 | HEIGHT: 62 IN | OXYGEN SATURATION: 93 % | WEIGHT: 118 LBS | DIASTOLIC BLOOD PRESSURE: 87 MMHG | SYSTOLIC BLOOD PRESSURE: 183 MMHG | RESPIRATION RATE: 16 BRPM | HEART RATE: 54 BPM | TEMPERATURE: 98.9 F

## 2020-07-18 DIAGNOSIS — S91.002A ANKLE WOUND, LEFT, INITIAL ENCOUNTER: Primary | ICD-10-CM

## 2020-07-18 PROCEDURE — 73610 X-RAY EXAM OF ANKLE: CPT

## 2020-07-18 PROCEDURE — 99283 EMERGENCY DEPT VISIT LOW MDM: CPT

## 2020-07-18 NOTE — ED NOTES
Patient reports that she believes she was bit by a brown recluse spider on her right ankle 3 weeks ago. Patient's ankle is swollen. Patient denies having a fever, chest pain or SOA.      Roya Dupree RN  07/18/20 8615

## 2020-07-19 NOTE — ED PROVIDER NOTES
EMERGENCY DEPARTMENT ENCOUNTER    Room Number:  SINGH/E  Date of encounter:  7/19/2020  PCP: Josep Kelley MD  Historian: Patient      HPI:  Chief Complaint: Wound on the right leg  A complete HPI/ROS/PMH/PSH/SH/FH are unobtainable due to: Nothing    Context: Della Vines is a 74 y.o. female who presents to the ED c/o a nonhealing wound on her right leg.  Patient thinks that she was bitten by brown recluse spider about 3 weeks ago, but did not see a spider and is only going off of what she sees versus what she is read on the Internet.    She is had no fever, no swelling.  She does present pictures on her phone of the wound over time.  It does appear at one point that it had a blister, then sloughed, had some eschar formation, sloughed again, and now has a scab.  It is not had any purulent drainage, the fluid in the blister was clear.      PAST MEDICAL HISTORY  Active Ambulatory Problems     Diagnosis Date Noted   • Visit for gynecologic examination 11/09/2017   • Mixed incontinence 11/09/2017   • Atrophic vaginitis 11/09/2017   • Age related osteoporosis 11/09/2017   • Bronchiectasis (CMS/[facility]) 05/19/2014   • CVA (cerebral vascular accident) (CMS/[facility]) 05/19/2014   • Essential hypertension 04/28/2016   • GERD (gastroesophageal reflux disease) 04/03/2014   • Hyperlipidemia 12/30/2019   • ANTONY (mycobacterium avium-intracellulare) (CMS/[facility]) 11/22/2016   • Coronary artery disease involving native coronary artery of native heart without angina pectoris 01/17/2020     Resolved Ambulatory Problems     Diagnosis Date Noted   • Abnormal stress test 01/13/2020     Past Medical History:   Diagnosis Date   • Acid reflux disease    • Bell's palsy    • Cerebellar infarction (CMS/[facility])    • Chest pain    • Hypertension    • Lung disease    • Osteoporosis    • PAF (paroxysmal atrial fibrillation) (CMS/[facility])    • Stroke (CMS/[facility])    • TIA (transient ischemic attack)    • Urinary tract infection          PAST SURGICAL HISTORY  Past  Surgical History:   Procedure Laterality Date   • ADENOIDECTOMY     • APPENDECTOMY     • BREAST FIBROADENOMA SURGERY     • CARDIAC CATHETERIZATION N/A 2020    Procedure: Left Heart Cath;  Surgeon: Kingsley Zimmerman MD;  Location:  [facility];  Service: Cardiovascular   • CARDIAC CATHETERIZATION N/A 2020    Procedure: Coronary angiography;  Surgeon: Kingsley Zimmerman MD;  Location:  [facility];  Service: Cardiovascular   • CARDIAC CATHETERIZATION N/A 2020    Procedure: Left ventriculography;  Surgeon: Kingsley Zimmerman MD;  Location:  [facility];  Service: Cardiovascular   • CARDIAC CATHETERIZATION N/A 2020    Procedure: Percutaneous Coronary Intervention;  Surgeon: Kingsley Zimmerman MD;  Location:  [facility];  Service: Cardiovascular   • CARDIAC CATHETERIZATION N/A 2020    Procedure: Stent GERA coronary;  Surgeon: Kingsley Zimmerman MD;  Location:  [facility];  Service: Cardiovascular   • HYSTERECTOMY     • MASTOID SURGERY     • TONSILLECTOMY           FAMILY HISTORY  Family History   Problem Relation Age of Onset   • Heart failure Father    • Hypertension Father    • Heart disease Father    • Breast cancer Mother    • Aneurysm Daughter          SOCIAL HISTORY  Social History     Socioeconomic History   • Marital status:      Spouse name: Tyrone Vines   • Number of children: 1   • Years of education: 14   • Highest education level: Not on file   Occupational History   • Occupation: retired chemical dependency counselor   Tobacco Use   • Smoking status: Former Smoker     Packs/day: 1.50     Years: 30.00     Pack years: 45.00     Start date:      Last attempt to quit:      Years since quittin.5   • Smokeless tobacco: Never Used   • Tobacco comment: caffeine use: 1 CUP COFFEE DAILY    Substance and Sexual Activity   • Alcohol use: No   • Drug use: No   • Sexual activity: Defer          ALLERGIES  Codeine; Erythromycin; Iodine; Lisinopril; Morphine; Moxifloxacin; Sulfa antibiotics; and Pneumococcal vaccines        REVIEW OF SYSTEMS  Review of Systems     All systems reviewed and negative except for those discussed in HPI.       PHYSICAL EXAM    I have reviewed the triage vital signs and nursing notes.    ED Triage Vitals [07/18/20 1906]   Temp Heart Rate Resp BP SpO2   98.9 °F (37.2 °C) 70 16 180/82 93 %      Temp src Heart Rate Source Patient Position BP Location FiO2 (%)   Tympanic Monitor Standing Left arm --       Physical Exam  GENERAL: not distressed  HENT: nares patent  EYES: no scleral icterus  CV: regular rhythm, regular rate  RESPIRATORY: normal effort  ABDOMEN: soft  MUSCULOSKELETAL: no deformity.  On the inner aspect of the right lower leg, just superior and posterior to the medial malleolus, there is a circular lesion approximately 1.5 cm in diameter.  It has a fresh scab.  There is mild surrounding erythema but does not extend beyond that circular area.  It is nontender to palpation, there is no purulent discharge, there is no swelling, and the leg is neurovascular intact distally.  NEURO: alert, moves all extremities, follows commands  SKIN: warm, dry        LAB RESULTS  No results found for this or any previous visit (from the past 24 hour(s)).    Ordered the above labs and independently reviewed the results.        RADIOLOGY  Xr Ankle 3+ View Right    Result Date: 7/18/2020  RIGHT ANKLE  CLINICAL HISTORY: Ankle pain and swelling  3 views of the right ankle demonstrate no bony or articular abnormalities. There is no evidence of recent or old fracture or subluxation.  This report was finalized on 7/18/2020 10:11 PM by Dr. Chan Montgomery M.D.        I ordered the above noted radiological studies. Reviewed by me and discussed with radiologist.  See dictation for official radiology interpretation.      PROCEDURES    Procedures      MEDICATIONS GIVEN IN ER    Medications - No  data to display      PROGRESS, DATA ANALYSIS, CONSULTS, AND MEDICAL DECISION MAKING    All labs have been independently reviewed by me.  All radiology studies have been reviewed by me and discussed with radiologist dictating the report.   EKG's independently viewed and interpreted by me.  Discussion below represents my analysis of pertinent findings related to patient's condition, differential diagnosis, treatment plan and final disposition.        ED Course as of Jul 19 0238   Sun Jul 19, 2020 0237 Based on the timeline, photos and description, it certainly possible that this was a brown recluse spider bite.  I have no way to substantiate or prove that, but the wound looks good at this point and healing as I might expect.  There is no sign of secondary infection and she can follow this up with her PCP in the outpatient setting.    [DP]   0237 Plain film was performed which showed no active disease, no subcutaneous air, no foreign body, no significant swelling    [DP]      ED Course User Index  [DP] Mitch Woodard MD             AS OF 02:38 VITALS:    BP - (!) 183/87  HR - 54  TEMP - 98.9 °F (37.2 °C) (Tympanic)  O2 SATS - 93%        DIAGNOSIS  Final diagnoses:   Ankle wound, left, initial encounter         DISPOSITION  Discharge           Mitch Woodard MD  07/19/20 0238

## 2020-08-26 ENCOUNTER — OFFICE VISIT (OUTPATIENT)
Dept: CARDIOLOGY | Facility: CLINIC | Age: 75
End: 2020-08-26

## 2020-08-26 VITALS
SYSTOLIC BLOOD PRESSURE: 142 MMHG | DIASTOLIC BLOOD PRESSURE: 70 MMHG | HEIGHT: 62 IN | BODY MASS INDEX: 21.86 KG/M2 | WEIGHT: 118.8 LBS | HEART RATE: 56 BPM

## 2020-08-26 DIAGNOSIS — E78.2 MIXED HYPERLIPIDEMIA: ICD-10-CM

## 2020-08-26 DIAGNOSIS — I10 ESSENTIAL HYPERTENSION: ICD-10-CM

## 2020-08-26 DIAGNOSIS — I25.10 CORONARY ARTERY DISEASE INVOLVING NATIVE CORONARY ARTERY OF NATIVE HEART WITHOUT ANGINA PECTORIS: Primary | ICD-10-CM

## 2020-08-26 PROCEDURE — 99214 OFFICE O/P EST MOD 30 MIN: CPT | Performed by: INTERNAL MEDICINE

## 2020-08-26 PROCEDURE — 93000 ELECTROCARDIOGRAM COMPLETE: CPT | Performed by: INTERNAL MEDICINE

## 2020-08-26 RX ORDER — AMLODIPINE BESYLATE 10 MG/1
10 TABLET ORAL DAILY
Qty: 90 TABLET | Refills: 3 | Status: SHIPPED | OUTPATIENT
Start: 2020-08-26 | End: 2020-09-15

## 2020-08-26 NOTE — PROGRESS NOTES
[facility] Follow Up Office Note     Encounter Date:20  Patient:Della Vines  :1945  MRN:[id number]      Chief Complaint:   Chief Complaint   Patient presents with   • Coronary Artery Disease     6 month f/u   • Hypertension     History of Presenting Illness:      Ms. Vines is a 74 y.o. woman with past medical history notable for coronary artery disease status post percutaneous intervention, TIAs, hypertension, mixed hyperlipidemia, and chronic lung disease who presents to my office for follow-up.  She was seen approximately 6 months ago and at that time had recently undergone a percutaneous intervention of the mid LAD after having chest pain and abnormal stress test.  She was doing well then and no changes were needed with her medical regimen.  Since that time she has continued to do fairly well.  She has not had any recurrent chest pain.  She does report generalized fatigue but otherwise no significant symptoms.     She has not been checking her blood pressure at home and this has been a chronic issue for her.  She was noted to be hypertensive in the emergency room for a recent visit.  Her blood pressure is mildly elevated today.    With regards to her current blood pressure regimen she is tolerating this well without any significant side effects.  She does have occasional leg edema that comes and goes but is not consistent.    She is tolerating her dual antiplatelet therapy but does have some bruising with this.  She also had bleeding for about 3 hours after having paradoxical cleaning.  I have asked her not to have another peer dental cleaning until she is off of her dual antiplatelet therapy.    She is tolerating her statin therapy without any significant side effects     Review of Systems:  Review of Systems   Constitution: Positive for malaise/fatigue.   HENT: Negative.    Eyes: Negative.    Cardiovascular: Negative.    Respiratory: Negative.    Endocrine: Negative.     Hematologic/Lymphatic: Negative.    Skin: Negative.    Musculoskeletal: Negative.    Gastrointestinal: Negative.    Genitourinary: Negative.    Neurological: Negative.    Psychiatric/Behavioral: Negative.    Allergic/Immunologic: Negative.        Current Outpatient Medications on File Prior to Visit   Medication Sig Dispense Refill   • amLODIPine (NORVASC) 5 MG tablet Take 1 tablet by mouth Daily. 90 tablet 3   • aspirin 81 MG EC tablet Take 1 tablet by mouth Daily. 90 tablet 3   • atorvastatin (LIPITOR) 40 MG tablet Take 1 tablet by mouth Daily. 30 tablet 11   • calcium carbonate (OS-JERRELL) 1250 (500 Ca) MG tablet Take 1 tablet by mouth.     • Cholecalciferol 2000 units tablet Take 4,000 Int'l Units by mouth.     • clopidogrel (PLAVIX) 75 MG tablet Take 1 tablet by mouth Daily. 90 tablet 3   • metoprolol succinate XL (TOPROL-XL) 50 MG 24 hr tablet TAKE 1 TABLET BY MOUTH EVERY DAY 90 tablet 3   • nitroglycerin (NITROSTAT) 0.4 MG SL tablet Place 1 tablet under the tongue Every 5 (Five) Minutes As Needed for Chest Pain. Place one tablet under tongue as needed for chest pain. 25 tablet 3   • pantoprazole (PROTONIX) 40 MG EC tablet TAKE 1 TABLET BY MOUTH EVERY MORNING 90 tablet 3   • topiramate (TOPAMAX) 25 MG tablet TK 1 T PO BID  3     No current facility-administered medications on file prior to visit.        Allergies   Allergen Reactions   • Codeine    • Erythromycin Diarrhea     Side effect    • Iodine Hives   • Lisinopril Other (See Comments)     COUGH   • Morphine Itching and Delirium   • Moxifloxacin      Elevated liver enzymes   • Sulfa Antibiotics    • Pneumococcal Vaccines Swelling and Rash       Past Medical History:   Diagnosis Date   • Abnormal stress test 1/13/2020    Added automatically from request for surgery [id number]   • Acid reflux disease    • Bell's palsy    • Cerebellar infarction (CMS/HCC)    • Chest pain    • Hyperlipidemia    • Hypertension    • Lung disease    • Osteoporosis    • PAF  (paroxysmal atrial fibrillation) (CMS/[facility])    • Stroke (CMS/[facility])    • TIA (transient ischemic attack)    • Urinary tract infection        Past Surgical History:   Procedure Laterality Date   • ADENOIDECTOMY     • APPENDECTOMY     • BREAST FIBROADENOMA SURGERY     • CARDIAC CATHETERIZATION N/A 2020    Procedure: Left Heart Cath;  Surgeon: Kingsley Zimmerman MD;  Location:  [facility] CATH INVASIVE LOCATION;  Service: Cardiovascular   • CARDIAC CATHETERIZATION N/A 2020    Procedure: Coronary angiography;  Surgeon: Kingsley Zimmerman MD;  Location:  [facility] CATH INVASIVE LOCATION;  Service: Cardiovascular   • CARDIAC CATHETERIZATION N/A 2020    Procedure: Left ventriculography;  Surgeon: Kingsley Zimmerman MD;  Location:  [facility] CATH INVASIVE LOCATION;  Service: Cardiovascular   • CARDIAC CATHETERIZATION N/A 2020    Procedure: Percutaneous Coronary Intervention;  Surgeon: Kingsley Zimmerman MD;  Location:  [facility] CATH INVASIVE LOCATION;  Service: Cardiovascular   • CARDIAC CATHETERIZATION N/A 2020    Procedure: Stent GERA coronary;  Surgeon: Kingsley Zimmerman MD;  Location:  [facility] CATH INVASIVE LOCATION;  Service: Cardiovascular   • HYSTERECTOMY     • MASTOID SURGERY     • TONSILLECTOMY         Social History     Socioeconomic History   • Marital status:      Spouse name: Tyrone Vines   • Number of children: 1   • Years of education: 14   • Highest education level: Not on file   Occupational History   • Occupation: retired chemical dependency counselor   Tobacco Use   • Smoking status: Former Smoker     Packs/day: 1.50     Years: 30.00     Pack years: 45.00     Start date:      Last attempt to quit:      Years since quittin.6   • Smokeless tobacco: Never Used   • Tobacco comment: caffeine use: 1 CUP COFFEE DAILY    Substance and Sexual Activity   • Alcohol use: No   • Drug use: No   • Sexual activity: Defer       Family History   Problem Relation Age of Onset   • Heart failure Father  "   • Hypertension Father    • Heart disease Father    • Breast cancer Mother    • Aneurysm Daughter        The following portions of the patient's history were reviewed and updated as appropriate: allergies, current medications, past family history, past medical history, past social history, past surgical history and problem list.       Objective:       Vitals:    08/26/20 1239   BP: 142/70   BP Location: Right arm   Patient Position: Sitting   Pulse: 56   Weight: 53.9 kg (118 lb 12.8 oz)   Height: 157.5 cm (62\")         Physical Exam:  Constitutional: Well appearing, well developed, no acute distress   HENT: Oropharynx clear and membrane moist  Eyes: Normal conjunctiva, no sclera icterus.  Neck: Supple, no carotid bruit bilaterally.  Cardiovascular: Bradycardic rate and regular rhythm, no murmur, no lower extremity edema.  Pulmonary: Normal respiratory effort, no lung sounds, no wheezing.  Abdominal: Soft, nontender, no hepatosplenomegaly, liver is non-pulsatile.  Neurological: Alert and orient x 3.   Skin: Warm, dry, no ecchymosis, no rash.  Psych: Appropriate mood and affect. Normal judgment and insight.       Lab Results   Component Value Date    GLUCOSE 87 01/18/2020    BUN 19 08/25/2020    CREATININE 0.6 (L) 08/25/2020    EGFRIFNONA 100 01/18/2020    BCR 33.0 (H) 08/25/2020    K 4.7 08/25/2020    CO2 18 (L) 08/25/2020    CALCIUM 9.7 08/25/2020    ALBUMIN 4.2 08/25/2020    LABIL2 1.4 08/25/2020    AST 29 08/25/2020    ALT 20 08/25/2020       Lab Results   Component Value Date    WBC 6.70 02/13/2020    HGB 14.0 02/13/2020    HCT 44.0 02/13/2020    MCV 93.0 02/13/2020     02/13/2020       Lab Results   Component Value Date    CHLPL 125 08/25/2020    CHLPL 125 05/20/2020    CHLPL 120 02/13/2020     Lab Results   Component Value Date    TRIG 94 08/25/2020    TRIG 112 05/20/2020    TRIG 100 02/13/2020     Lab Results   Component Value Date    HDL 44 (L) 08/25/2020    HDL 46 (L) 05/20/2020    HDL 41 (L) " "02/13/2020     Lab Results   Component Value Date    LDL 62 08/25/2020    LDL 57 05/20/2020    LDL 59 02/13/2020           ECG 12 Lead  Date/Time: 8/26/2020 1:19 PM  Performed by: Kingsley Zimmerman MD  Authorized by: Kingsley Zimmerman MD   Comparison: compared with previous ECG from 2/26/2020  Similar to previous ECG  Rhythm: sinus rhythm    Clinical impression: normal ECG        Cardiac Catheterization 1/17/2020:  · Severe single-vessel coronary artery disease with a long stenoses involving the proximal to mid LAD at 90%.  There is 40% ostial circumflex stenoses and scattered 20% stenoses in the RCA.  · Normal left ventricular function at 60% with a normal left ventricular filling pressure 11 mmHg.  · Successful revascularization of proximal to mid LAD with placement of overlapping 2.25 x 33 mm and 2.25 x 8 mm Xience drug-eluting stents with the mid and proximal segments postdilated with a noncompliant 2.5 mm balloon to 16 roland.    Mobile Telemetry 1/16/2020:  · A normal monitor study.  · Underlying heart rhythm was sinus with an average rate of 86 bpm no significant arrhythmias noted during her study    Stress MPI 1/7/2020:  · Patient complained of 8/10 chest pressure and \"burning sensation\" with exertion  · Myocardial perfusion imaging indicates a normal myocardial perfusion study with no evidence of ischemia.  · Left ventricular ejection fraction is severely reduced (Calculated EF = 22%).  · The gating does not appear to accurate on this study. Would recommend correlation with echocardiogram.    Carotid Duplex 1/7/2020:  · Distal right internal carotid artery mild stenosis.  · Mid left internal carotid artery mild stenosis.     Assessment:          Diagnosis Plan   1. Coronary artery disease involving native coronary artery of native heart without angina pectoris     2. Mixed hyperlipidemia     3. Essential hypertension            Plan:       Ms. Vines is a 74-year-old woman with past medical history notable for " coronary artery disease status post percutaneous intervention, TIAs, hypertension, mixed hyperlipidemia, and chronic lung disease who presents to my office for follow-up.  In general she is doing well.  I would like to optimize her blood pressure control and have titrated up her amlodipine from 5 mg up to 10 mg.  If she does have any worsening of her edema we can go back to 5 mg and consider changing her metoprolol succinate to carvedilol as this might improve her blood pressure control.  If her still having issues we could consider chlorthalidone or spironolactone    Coronary artery disease without angina:  · Continue dual antiplatelet therapy  · Continue statin  · Continue beta-blocker      Hypertension:  · Will uptitrate amlodipine from 5 mg up to 10 mg  · Continue metoprolol succinate  · Could consider changing from metoprolol succinate to carvedilol if blood pressure does not improve with up titration of amlodipine.  · Could consider adding thiazide diuretic if not achieving goal.       Mixed hyperlipidemia:  · Patient currently on statin therapy     History of CVA:  · Continue aspirin and statin      Follow-up:  6 months      Kingsley Zimmerman MD  [facility]  08/26/20  12:54

## 2020-09-15 ENCOUNTER — TELEPHONE (OUTPATIENT)
Dept: CARDIOLOGY | Facility: CLINIC | Age: 75
End: 2020-09-15

## 2020-09-15 DIAGNOSIS — I10 ESSENTIAL HYPERTENSION: ICD-10-CM

## 2020-09-15 RX ORDER — AMLODIPINE BESYLATE 5 MG/1
5 TABLET ORAL DAILY
Qty: 90 TABLET | Refills: 3 | Status: SHIPPED | OUTPATIENT
Start: 2020-09-15 | End: 2020-12-07

## 2020-09-15 NOTE — TELEPHONE ENCOUNTER
Patient called stating that at her last visit with  he moved her Amlodipine from 5 mg to 10 mg. She stated that now she is having swelling in her feet.    She can be reached at [phone]    Thanks

## 2020-12-07 ENCOUNTER — CLINICAL SUPPORT (OUTPATIENT)
Dept: CARDIOLOGY | Facility: CLINIC | Age: 75
End: 2020-12-07

## 2020-12-07 DIAGNOSIS — I25.10 CORONARY ARTERY DISEASE INVOLVING NATIVE CORONARY ARTERY OF NATIVE HEART WITHOUT ANGINA PECTORIS: Primary | ICD-10-CM

## 2020-12-07 PROCEDURE — 93000 ELECTROCARDIOGRAM COMPLETE: CPT | Performed by: INTERNAL MEDICINE

## 2020-12-07 NOTE — PROGRESS NOTES
Procedure     ECG 12 Lead    Date/Time: 12/7/2020 2:43 PM  Performed by: Km Smith MA  Authorized by: Kingsley Zimmerman MD   Comparison: compared with previous ECG from 8/26/2020  Similar to previous ECG  Rhythm: sinus rhythm    Clinical impression: normal ECG           The patient came in for a EKG check per Dr Zimmerman. He reviewed the EKG and stated that it was normal. He addressed this to the patient and informed her that she was ok to leave and no further testing is needed.

## 2020-12-15 ENCOUNTER — TELEPHONE (OUTPATIENT)
Dept: CARDIOLOGY | Facility: CLINIC | Age: 75
End: 2020-12-15

## 2020-12-15 ENCOUNTER — APPOINTMENT (OUTPATIENT)
Dept: WOMENS IMAGING | Facility: HOSPITAL | Age: 75
End: 2020-12-15

## 2020-12-15 PROCEDURE — 77067 SCR MAMMO BI INCL CAD: CPT | Performed by: RADIOLOGY

## 2020-12-15 PROCEDURE — 77063 BREAST TOMOSYNTHESIS BI: CPT | Performed by: RADIOLOGY

## 2020-12-15 NOTE — TELEPHONE ENCOUNTER
Patient left  stating that she will be doing a MRA on the 28 th of this month. She stated that she had two stent placements back in January. She would like to know if this would be ok to have.     She can be reached at [phone]    Thanks

## 2020-12-28 RX ORDER — CLOPIDOGREL BISULFATE 75 MG/1
75 TABLET ORAL DAILY
Qty: 90 TABLET | Refills: 3 | Status: SHIPPED | OUTPATIENT
Start: 2020-12-28 | End: 2021-09-20

## 2020-12-29 RX ORDER — ASPIRIN 81 MG/1
81 TABLET ORAL DAILY
Qty: 90 TABLET | Refills: 3 | Status: SHIPPED | OUTPATIENT
Start: 2020-12-29

## 2020-12-30 DIAGNOSIS — I10 ESSENTIAL HYPERTENSION: Primary | ICD-10-CM

## 2020-12-30 RX ORDER — CHLORTHALIDONE 25 MG/1
25 TABLET ORAL DAILY
Qty: 90 TABLET | Refills: 3 | Status: SHIPPED | OUTPATIENT
Start: 2020-12-30 | End: 2021-09-20

## 2020-12-30 RX ORDER — AMLODIPINE BESYLATE 5 MG/1
5 TABLET ORAL DAILY
COMMUNITY
End: 2021-01-15 | Stop reason: SDUPTHER

## 2020-12-30 NOTE — TELEPHONE ENCOUNTER
Patient had higher BP on lower amlodipine and her amlodipine was increased and now with recurrent leg swelling    Will go back to 5 mg of amlodipine and start chlorthalidone 25 mg and check BMP in one week

## 2021-01-12 ENCOUNTER — TELEPHONE (OUTPATIENT)
Dept: CARDIOLOGY | Facility: CLINIC | Age: 76
End: 2021-01-12

## 2021-01-12 RX ORDER — AMLODIPINE BESYLATE 5 MG/1
5 TABLET ORAL DAILY
Qty: 30 TABLET | Refills: 3 | Status: CANCELLED | OUTPATIENT
Start: 2021-01-12

## 2021-01-12 NOTE — TELEPHONE ENCOUNTER
Patient called and left  stating that her B/P has been running in the 130's over 70-80's. She did not have any complaints, patient stated that she is doing fine.    She can be reached at [phone]

## 2021-01-13 NOTE — TELEPHONE ENCOUNTER
Call patient back.  This is a reasonable result given issues with leg swelling with increasing amlodipine.  Will monitor her blood pressure at home and see her back in the office as scheduled in March and decide if any changes are needed.  Could consider changing from metoprolol to carvedilol but will hold off for now in favor of seeing her in the office.

## 2021-01-15 RX ORDER — AMLODIPINE BESYLATE 5 MG/1
5 TABLET ORAL DAILY
Qty: 90 TABLET | Refills: 3 | Status: SHIPPED | OUTPATIENT
Start: 2021-01-15 | End: 2021-05-03

## 2021-02-05 RX ORDER — ATORVASTATIN CALCIUM 40 MG/1
40 TABLET, FILM COATED ORAL DAILY
Qty: 90 TABLET | Refills: 3 | Status: SHIPPED | OUTPATIENT
Start: 2021-02-05 | End: 2022-02-14

## 2021-02-26 ENCOUNTER — TELEPHONE (OUTPATIENT)
Dept: CARDIOLOGY | Facility: CLINIC | Age: 76
End: 2021-02-26

## 2021-02-26 NOTE — TELEPHONE ENCOUNTER
Patient called stating that she will be getting her second COVID vaccine this weekend and she would like to change her apt to Wednesday.    She can be reached at [phone]    Thanks    Epidermal Autograft Text: The defect edges were debeveled with a #15 scalpel blade.  Given the location of the defect, shape of the defect and the proximity to free margins an epidermal autograft was deemed most appropriate.  Using a sterile surgical marker, the primary defect shape was transferred to the donor site. The epidermal graft was then harvested.  The skin graft was then placed in the primary defect and oriented appropriately.

## 2021-03-03 ENCOUNTER — OFFICE VISIT (OUTPATIENT)
Dept: CARDIOLOGY | Facility: CLINIC | Age: 76
End: 2021-03-03

## 2021-03-03 VITALS
HEART RATE: 56 BPM | WEIGHT: 119.2 LBS | SYSTOLIC BLOOD PRESSURE: 138 MMHG | BODY MASS INDEX: 21.94 KG/M2 | DIASTOLIC BLOOD PRESSURE: 80 MMHG | HEIGHT: 62 IN

## 2021-03-03 DIAGNOSIS — Z23 IMMUNIZATION DUE: ICD-10-CM

## 2021-03-03 DIAGNOSIS — I10 ESSENTIAL HYPERTENSION: ICD-10-CM

## 2021-03-03 DIAGNOSIS — I25.10 CORONARY ARTERY DISEASE INVOLVING NATIVE CORONARY ARTERY OF NATIVE HEART WITHOUT ANGINA PECTORIS: Primary | ICD-10-CM

## 2021-03-03 DIAGNOSIS — E78.2 MIXED HYPERLIPIDEMIA: ICD-10-CM

## 2021-03-03 PROCEDURE — 93000 ELECTROCARDIOGRAM COMPLETE: CPT | Performed by: INTERNAL MEDICINE

## 2021-03-03 PROCEDURE — 99214 OFFICE O/P EST MOD 30 MIN: CPT | Performed by: INTERNAL MEDICINE

## 2021-03-03 NOTE — PROGRESS NOTES
[facility] Follow Up Office Note     Encounter Date:21  Patient:Della Vines  :1945  MRN:[id number]      Chief Complaint:   Chief Complaint   Patient presents with   • Coronary Artery Disease     6 month f/u   • Hypertension   • Hyperlipidemia     History of Presenting Illness:      Ms. Vines is a 75 y.o. woman with past medical history notable for coronary artery disease status post percutaneous intervention, TIAs, hypertension, mixed hyperlipidemia, and chronic lung disease who presents to my office for follow-up.  Patient was last seen approximately 6 months ago and at that time was doing reasonably well but we were making some adjustments with her blood pressure regimen.  We finally found a better regimen for her that seem to work and overall she is doing much better.  She has not had any issues with chest pain or shortness of breath.  Her blood pressure is also better controlled.         Review of Systems:  Review of Systems   Constitution: Negative.   HENT: Negative.    Eyes: Negative.    Cardiovascular: Negative.    Respiratory: Negative.    Endocrine: Negative.    Hematologic/Lymphatic: Negative.    Skin: Negative.    Musculoskeletal: Negative.    Gastrointestinal: Negative.    Genitourinary: Negative.    Neurological: Negative.    Psychiatric/Behavioral: Negative.    Allergic/Immunologic: Negative.        Current Outpatient Medications on File Prior to Visit   Medication Sig Dispense Refill   • amLODIPine (NORVASC) 5 MG tablet Take 1 tablet by mouth Daily. 90 tablet 3   • aspirin (aspirin) 81 MG EC tablet Take 1 tablet by mouth Daily. 90 tablet 3   • atorvastatin (LIPITOR) 40 MG tablet TAKE 1 TABLET BY MOUTH DAILY 90 tablet 3   • calcium carbonate (OS-JERRELL) 1250 (500 Ca) MG tablet Take 1 tablet by mouth.     • chlorthalidone (HYGROTON) 25 MG tablet Take 1 tablet by mouth Daily. 90 tablet 3   • Cholecalciferol 2000 units tablet Take 4,000 Int'l Units by mouth.     • clopidogrel  (PLAVIX) 75 MG tablet Take 1 tablet by mouth Daily. 90 tablet 3   • metoprolol succinate XL (TOPROL-XL) 50 MG 24 hr tablet TAKE 1 TABLET BY MOUTH EVERY DAY 90 tablet 3   • nitroglycerin (NITROSTAT) 0.4 MG SL tablet Place 1 tablet under the tongue Every 5 (Five) Minutes As Needed for Chest Pain. Place one tablet under tongue as needed for chest pain. 25 tablet 3   • pantoprazole (PROTONIX) 40 MG EC tablet TAKE 1 TABLET BY MOUTH EVERY MORNING 90 tablet 3   • topiramate (TOPAMAX) 25 MG tablet TK 1 T PO BID  3     No current facility-administered medications on file prior to visit.        Allergies   Allergen Reactions   • Codeine    • Erythromycin Diarrhea     Side effect    • Iodine Hives   • Lisinopril Other (See Comments)     COUGH   • Morphine Itching and Delirium   • Moxifloxacin      Elevated liver enzymes   • Sulfa Antibiotics    • Pneumococcal Vaccines Swelling and Rash       Past Medical History:   Diagnosis Date   • Abnormal stress test 1/13/2020    Added automatically from request for surgery [id number]   • Acid reflux disease    • Bell's palsy    • Cerebellar infarction (CMS/HCC)    • Chest pain    • Hyperlipidemia    • Hypertension    • Lung disease    • Osteoporosis    • PAF (paroxysmal atrial fibrillation) (CMS/HCC)    • Stroke (CMS/HCC)    • TIA (transient ischemic attack)    • Urinary tract infection        Past Surgical History:   Procedure Laterality Date   • ADENOIDECTOMY     • APPENDECTOMY     • BREAST FIBROADENOMA SURGERY     • CARDIAC CATHETERIZATION N/A 1/17/2020    Procedure: Left Heart Cath;  Surgeon: Kingsley Zimmerman MD;  Location: [facility] INVASIVE LOCATION;  Service: Cardiovascular   • CARDIAC CATHETERIZATION N/A 1/17/2020    Procedure: Coronary angiography;  Surgeon: Kingsley Zimmerman MD;  Location: [facility] CATH INVASIVE LOCATION;  Service: Cardiovascular   • CARDIAC CATHETERIZATION N/A 1/17/2020    Procedure: Left ventriculography;  Surgeon: Kingsley Zimmerman MD;  Location: [facility]  "INVASIVE LOCATION;  Service: Cardiovascular   • CARDIAC CATHETERIZATION N/A 2020    Procedure: Percutaneous Coronary Intervention;  Surgeon: Kingsley Zimmerman MD;  Location:  [facility]ATH INVASIVE LOC[facility];  Service: Cardiovascular   • CARDIAC CATHETERIZATION N/A 2020    Procedure: Stent GERA coronary;  Surgeon: Kingsley Zimmerman MD;  Location:  [facility]ATH INVASIVE LOCATION;  Service: Cardiovascular   • HYSTERECTOMY     • MASTOID SURGERY     • TONSILLECTOMY         Social History     Socioeconomic History   • Marital status:      Spouse name: Tyrone Vines   • Number of children: 1   • Years of education: 14   • Highest education level: Not on file   Occupational History   • Occupation: retired chemical dependency counselor   Tobacco Use   • Smoking status: Former Smoker     Packs/day: 1.50     Years: 30.00     Pack years: 45.00     Start date:      Quit date:      Years since quittin.1   • Smokeless tobacco: Never Used   • Tobacco comment: caffeine use: 1 CUP COFFEE DAILY    Substance and Sexual Activity   • Alcohol use: No   • Drug use: No   • Sexual activity: Defer       Family History   Problem Relation Age of Onset   • Heart failure Father    • Hypertension Father    • Heart disease Father    • Breast cancer Mother    • Aneurysm Daughter        The following portions of the patient's history were reviewed and updated as appropriate: allergies, current medications, past family history, past medical history, past social history, past surgical history and problem list.       Objective:       Vitals:    21 0846   BP: 138/80   BP Location: Left arm   Patient Position: Sitting   Pulse: 56   Weight: 54.1 kg (119 lb 3.2 oz)   Height: 157.5 cm (62\")         Physical Exam:  Constitutional: Well appearing, well developed, no acute distress   HENT: Oropharynx clear and membrane moist  Eyes: Normal conjunctiva, no sclera icterus.  Neck: Supple, no carotid bruit bilaterally.  Cardiovascular: " Regular rate and regular rhythm, no murmur, no lower extremity edema.  Pulmonary: Normal respiratory effort, no lung sounds, no wheezing.  Abdominal: Soft, nontender, no hepatosplenomegaly, liver is non-pulsatile.  Neurological: Alert and orient x 3.   Skin: Warm, dry, no ecchymosis, no rash.  Psych: Appropriate mood and affect. Normal judgment and insight.       Lab Results   Component Value Date    GLUCOSE 87 01/18/2020    BUN 16 11/27/2020    CREATININE 0.8 11/27/2020    EGFRIFNONA 100 01/18/2020    BCR 21.0 (H) 11/27/2020    K 4.7 11/27/2020    CO2 22 11/27/2020    CALCIUM 10.1 11/27/2020    ALBUMIN 4.0 11/27/2020    LABIL2 1.4 11/27/2020    AST 25 11/27/2020    ALT 18 11/27/2020       Lab Results   Component Value Date    WBC 9.63 11/27/2020    HGB 13.8 11/27/2020    HCT 44.8 11/27/2020    MCV 98.7 11/27/2020     11/27/2020       Lab Results   Component Value Date    CHLPL 125 08/25/2020    CHLPL 125 05/20/2020    CHLPL 120 02/13/2020     Lab Results   Component Value Date    TRIG 94 08/25/2020    TRIG 112 05/20/2020    TRIG 100 02/13/2020     Lab Results   Component Value Date    HDL 44 (L) 08/25/2020    HDL 46 (L) 05/20/2020    HDL 41 (L) 02/13/2020     Lab Results   Component Value Date    LDL 62 08/25/2020    LDL 57 05/20/2020    LDL 59 02/13/2020           ECG 12 Lead    Date/Time: 3/3/2021 9:23 AM  Performed by: Kingsley Zimmerman MD  Authorized by: Kingsley Zimmerman MD   Comparison: compared with previous ECG from 12/7/2020  Similar to previous ECG  Rhythm: sinus rhythm    Clinical impression: normal ECG        Cardiac Catheterization 1/17/2020:  · Severe single-vessel coronary artery disease with a long stenoses involving the proximal to mid LAD at 90%.  There is 40% ostial circumflex stenoses and scattered 20% stenoses in the RCA.  · Normal left ventricular function at 60% with a normal left ventricular filling pressure 11 mmHg.  · Successful revascularization of proximal to mid LAD with  "placement of overlapping 2.25 x 33 mm and 2.25 x 8 mm Xience drug-eluting stents with the mid and proximal segments postdilated with a noncompliant 2.5 mm balloon to 16 roland.    Mobile Telemetry 1/16/2020:  · A normal monitor study.  · Underlying heart rhythm was sinus with an average rate of 86 bpm no significant arrhythmias noted during her study    Stress MPI 1/7/2020:  · Patient complained of 8/10 chest pressure and \"burning sensation\" with exertion  · Myocardial perfusion imaging indicates a normal myocardial perfusion study with no evidence of ischemia.  · Left ventricular ejection fraction is severely reduced (Calculated EF = 22%).  · The gating does not appear to accurate on this study. Would recommend correlation with echocardiogram.    Carotid Duplex 1/7/2020:  · Distal right internal carotid artery mild stenosis.  · Mid left internal carotid artery mild stenosis.     Assessment:          Diagnosis Plan   1. Coronary artery disease involving native coronary artery of native heart without angina pectoris  ECG 12 Lead   2. Essential hypertension     3. Mixed hyperlipidemia            Plan:       Ms. Vines is a 75 y.o. woman with past medical history notable for coronary artery disease status post percutaneous intervention, TIAs, hypertension, mixed hyperlipidemia, and chronic lung disease who presents to my office for follow-up.  Overall patient is doing quite well.  No changes are needed with her medical regimen.  We will plan on seeing her back in 6 months      Coronary artery disease without angina:  · Continue dual antiplatelet therapy but may hold for 5 days for any dental cleaning procedures or other surgery.  · Continue statin  · Continue beta-blocker    · CBC 11/2020 demonstrates normal platelet count    Hypertension:  · Will continue amlodipine 10 mg  · Continue metoprolol succinate  · Continue chlorthalidone  · BMP 11/2020 demonstrates normal sodium, potassium, and creatinine     Mixed " hyperlipidemia:  · Patient currently on statin therapy  · Lipid panel 8/2020 demonstrates good LDL control  · CMP 11/2020 demonstrates normal ALT and AST     History of CVA:  · Continue aspirin and statin      Follow-up:  6 months      Kingsley Zimmerman MD  [facility]  03/03/21  09:25 EST

## 2021-05-03 RX ORDER — AMLODIPINE BESYLATE 5 MG/1
5 TABLET ORAL DAILY
Qty: 90 TABLET | Refills: 3 | Status: SHIPPED | OUTPATIENT
Start: 2021-05-03 | End: 2022-03-23 | Stop reason: SDUPTHER

## 2021-09-20 ENCOUNTER — OFFICE VISIT (OUTPATIENT)
Dept: CARDIOLOGY | Facility: CLINIC | Age: 76
End: 2021-09-20

## 2021-09-20 VITALS
HEART RATE: 51 BPM | SYSTOLIC BLOOD PRESSURE: 152 MMHG | WEIGHT: 120 LBS | BODY MASS INDEX: 22.08 KG/M2 | DIASTOLIC BLOOD PRESSURE: 80 MMHG | HEIGHT: 62 IN

## 2021-09-20 DIAGNOSIS — I25.10 CORONARY ARTERY DISEASE INVOLVING NATIVE CORONARY ARTERY OF NATIVE HEART WITHOUT ANGINA PECTORIS: Primary | ICD-10-CM

## 2021-09-20 DIAGNOSIS — E78.2 MIXED HYPERLIPIDEMIA: ICD-10-CM

## 2021-09-20 DIAGNOSIS — I10 ESSENTIAL HYPERTENSION: ICD-10-CM

## 2021-09-20 DIAGNOSIS — Z86.73 HISTORY OF CVA (CEREBROVASCULAR ACCIDENT): ICD-10-CM

## 2021-09-20 PROCEDURE — 99214 OFFICE O/P EST MOD 30 MIN: CPT | Performed by: INTERNAL MEDICINE

## 2021-09-20 PROCEDURE — 93000 ELECTROCARDIOGRAM COMPLETE: CPT | Performed by: INTERNAL MEDICINE

## 2021-09-20 NOTE — PROGRESS NOTES
[facility] Follow Up Office Note     Encounter Date:21  Patient:Della Vines  :1945  MRN:[id number]      Chief Complaint:   Chief Complaint   Patient presents with   • Coronary Artery Disease     6 month f/u   • Congestive Heart Failure   • Hypertension   • Hyperlipidemia     History of Presenting Illness:      Ms. Vines is a 75 y.o. woman with past medical history notable for coronary artery disease status post percutaneous intervention, TIAs, hypertension, mixed hyperlipidemia, and chronic lung disease who presents to my office for follow-up.  She was last approximately 6 months ago and at that time was doing very well.  Her blood pressure was doing much better at that time.  We had just discontinued her chlorthalidone but fortunately her blood pressure seem to be doing well.  Unfortunately even stopping the chlorthalidone she still has some ankle cramps mainly at night.  Unfortunately here lately her blood pressure has been increasing.  Also she has noticed some more shortness of breath and dyspnea on exertion       Review of Systems:  Review of Systems   Constitutional: Negative.   HENT: Negative.    Eyes: Negative.    Cardiovascular: Positive for dyspnea on exertion.   Respiratory: Positive for shortness of breath.    Endocrine: Negative.    Hematologic/Lymphatic: Negative.    Skin: Negative.    Musculoskeletal: Positive for muscle cramps.   Gastrointestinal: Negative.    Genitourinary: Negative.    Neurological: Negative.    Psychiatric/Behavioral: Negative.    Allergic/Immunologic: Negative.        Current Outpatient Medications on File Prior to Visit   Medication Sig Dispense Refill   • amLODIPine (NORVASC) 5 MG tablet TAKE 1 TABLET BY MOUTH DAILY 90 tablet 3   • aspirin (aspirin) 81 MG EC tablet Take 1 tablet by mouth Daily. 90 tablet 3   • atorvastatin (LIPITOR) 40 MG tablet TAKE 1 TABLET BY MOUTH DAILY 90 tablet 3   • calcium carbonate (OS-JERRELL) 1250 (500 Ca) MG tablet Take 1 tablet  by mouth.     • Cholecalciferol 2000 units tablet Take 4,000 Int'l Units by mouth.     • clopidogrel (PLAVIX) 75 MG tablet Take 1 tablet by mouth Daily. 90 tablet 3   • metoprolol succinate XL (TOPROL-XL) 50 MG 24 hr tablet TAKE 1 TABLET BY MOUTH EVERY DAY 90 tablet 3   • nitroglycerin (NITROSTAT) 0.4 MG SL tablet Place 1 tablet under the tongue Every 5 (Five) Minutes As Needed for Chest Pain. Place one tablet under tongue as needed for chest pain. 25 tablet 3   • pantoprazole (PROTONIX) 40 MG EC tablet TAKE 1 TABLET BY MOUTH EVERY MORNING 90 tablet 3   • topiramate (TOPAMAX) 25 MG tablet TK 1 T PO BID  3   • [DISCONTINUED] chlorthalidone (HYGROTON) 25 MG tablet Take 1 tablet by mouth Daily. 90 tablet 3     No current facility-administered medications on file prior to visit.       Allergies   Allergen Reactions   • Codeine    • Erythromycin Diarrhea     Side effect    • Iodine Hives   • Lisinopril Other (See Comments)     COUGH   • Morphine Itching and Delirium   • Moxifloxacin      Elevated liver enzymes   • Sulfa Antibiotics    • Pneumococcal Vaccines Swelling and Rash       Past Medical History:   Diagnosis Date   • Abnormal stress test 1/13/2020    Added automatically from request for surgery [id number]   • Acid reflux disease    • Bell's palsy    • Cerebellar infarction (CMS/HCC)    • Chest pain    • CVA (cerebral vascular accident) (CMS/HCC) 5/19/2014    Overview:  Per old records in distant past in internal capsule   • Hyperlipidemia    • Hypertension    • Lung disease    • Osteoporosis    • PAF (paroxysmal atrial fibrillation) (CMS/HCC)    • Stroke (CMS/HCC)    • TIA (transient ischemic attack)    • Urinary tract infection        Past Surgical History:   Procedure Laterality Date   • ADENOIDECTOMY     • APPENDECTOMY     • BREAST FIBROADENOMA SURGERY     • CARDIAC CATHETERIZATION N/A 1/17/2020    Procedure: Left Heart Cath;  Surgeon: Kingsley Zimmerman MD;  [facility] [facility];  Service:  Cardiovascular   • CARDIAC CATHETERIZATION N/A 2020    Procedure: Coronary angiography;  Surgeon: Kingsley Zimmerman MD;  Location:  [facility] CATH INVASIVE LOCATION;  Service: Cardiovascular   • CARDIAC CATHETERIZATION N/A 2020    Procedure: Left ventriculography;  Surgeon: Kingsley Zimmerman MD;  Location:  [facility] CATH INVASIVE LOCATION;  Service: Cardiovascular   • CARDIAC CATHETERIZATION N/A 2020    Procedure: Percutaneous Coronary Intervention;  Surgeon: Kingsley Zimmerman MD;  Location:  [facility] CATH INVASIVE LOCATION;  Service: Cardiovascular   • CARDIAC CATHETERIZATION N/A 2020    Procedure: Stent GERA coronary;  Surgeon: Kingsley Zimmerman MD;  Location:  [facility] CATH INVASIVE LOCATION;  Service: Cardiovascular   • HYSTERECTOMY     • MASTOID SURGERY     • TONSILLECTOMY         Social History     Socioeconomic History   • Marital status:      Spouse name: Tyrone Vines   • Number of children: 1   • Years of education: 14   • Highest education level: Not on file   Tobacco Use   • Smoking status: Former Smoker     Packs/day: 1.50     Years: 30.00     Pack years: 45.00     Start date:      Quit date:      Years since quittin.7   • Smokeless tobacco: Never Used   • Tobacco comment: caffeine use: 1 CUP COFFEE DAILY    Substance and Sexual Activity   • Alcohol use: No   • Drug use: No   • Sexual activity: Defer       Family History   Problem Relation Age of Onset   • Heart failure Father    • Hypertension Father    • Heart disease Father    • Breast cancer Mother    • Aneurysm Daughter        The following portions of the patient's history were reviewed and updated as appropriate: allergies, current medications, past family history, past medical history, past social history, past surgical history and problem list.       Objective:       Vitals:    21 1044   BP: 152/80   BP Location: Left arm   Patient Position: Sitting   Pulse: 51   Weight: 54.4 kg (120 lb)   Height: 157.5 cm  "(62\")     Body mass index is 21.95 kg/m².     Physical Exam:  Constitutional: Well appearing, well developed, no acute distress   HENT: Oropharynx clear and membrane moist  Eyes: Normal conjunctiva, no sclera icterus.  Neck: Supple, no carotid bruit bilaterally.  Cardiovascular: Regular rate and regular rhythm, no murmur, no lower extremity edema.  Pulmonary: Normal respiratory effort, no lung sounds, no wheezing.  Abdominal: Soft, nontender, no hepatosplenomegaly, liver is non-pulsatile.  Neurological: Alert and orient x 3.   Skin: Warm, dry, no ecchymosis, no rash.  Psych: Appropriate mood and affect. Normal judgment and insight.       Lab Results   Component Value Date    GLUCOSE 87 2020    BUN 15 2021    CREATININE 0.6 (L) 2021    EGFRIFNONA 100 2020    BCR 25.0 (H) 2021    K 4.8 2021    CO2 24 2021    CALCIUM 10.3 (H) 2021    ALBUMIN 4.3 2021    LABIL2 1.4 2021    AST 24 2021    ALT 16 2021       Lab Results   Component Value Date    WBC 6.87 2021    HGB 13.7 2021    HCT 45.3 2021    MCV 94.4 2021     2021       Lab Results   Component Value Date    CHLPL 125 2020    CHLPL 125 2020    CHLPL 120 2020     Lab Results   Component Value Date    TRIG 94 2020    TRIG 112 2020    TRIG 100 2020     Lab Results   Component Value Date    HDL 44 (L) 2020    HDL 46 (L) 2020    HDL 41 (L) 2020     Lab Results   Component Value Date    LDL 62 2020    LDL 57 2020    LDL 59 2020     CMP 9/3/2021:  Sodium 145  Potassium 4.8  Chloride 107  CO2 24  BUN 15  Creatinine 0.6  ALT 16  AST 24  Alk phos 84    Lipid Panel 9/3/2021:  Total cholesterol: 132  Tri  HDL: 42  LDL: 69      ECG 12 Lead    Date/Time: 2021 11:03 AM  Performed by: Kingsley Zimmerman MD  Authorized by: Kingsley Zimmerman MD   Comparison: compared with previous ECG from " "3/3/2021  Similar to previous ECG  Rhythm: sinus rhythm    Clinical impression: normal ECG        Cardiac Catheterization 1/17/2020:  · Severe single-vessel coronary artery disease with a long stenoses involving the proximal to mid LAD at 90%.  There is 40% ostial circumflex stenoses and scattered 20% stenoses in the RCA.  · Normal left ventricular function at 60% with a normal left ventricular filling pressure 11 mmHg.  · Successful revascularization of proximal to mid LAD with placement of overlapping 2.25 x 33 mm and 2.25 x 8 mm Xience drug-eluting stents with the mid and proximal segments postdilated with a noncompliant 2.5 mm balloon to 16 roland.    Mobile Telemetry 1/16/2020:  · A normal monitor study.  · Underlying heart rhythm was sinus with an average rate of 86 bpm no significant arrhythmias noted during her study    Stress MPI 1/7/2020:  · Patient complained of 8/10 chest pressure and \"burning sensation\" with exertion  · Myocardial perfusion imaging indicates a normal myocardial perfusion study with no evidence of ischemia.  · Left ventricular ejection fraction is severely reduced (Calculated EF = 22%).  · The gating does not appear to accurate on this study. Would recommend correlation with echocardiogram.    Carotid Duplex 1/7/2020:  · Distal right internal carotid artery mild stenosis.  · Mid left internal carotid artery mild stenosis.     Assessment:          Diagnosis Plan   1. Coronary artery disease involving native coronary artery of native heart without angina pectoris  ECG 12 Lead    Adult Transthoracic Echo Complete W/ Cont if Necessary Per Protocol   2. Essential hypertension     3. Mixed hyperlipidemia     4. History of CVA (cerebrovascular accident)            Plan:       Ms. Vines is a 75 y.o. woman with past medical history notable for coronary artery disease status post percutaneous intervention, TIAs, hypertension, mixed hyperlipidemia, and chronic lung disease who presents to my office for " follow-up.  Overall patient seems to have a little bit more shortness of breath and Eads exertion.  She does have underlying chronic lung disease which could be contributing however her blood pressure control is not quite optimal.  This could also be playing a role.  Patient does have significant issues with multiple different medications.  When opportunity would be to change her metoprolol succinate to carvedilol or add spironolactone.  That being the case patient is somewhat hesitant to make changes at this time.  I would like to first follow-up with an echocardiogram to assess for any changes in her left ventricular function and or pulmonary hypertension which could be contributing and would likely help guide appropriate medical therapy.  We can follow-up on her blood pressure at that time and determine if changes are needed.      Coronary artery disease without angina:  · Continue aspirin but will stop plavix as patient over a year out from her procedure and did have significant gum bleeding with recent dental procedure  · Continue statin  · Continue beta-blocker     · CBC 9/2021 demonstrates normal platelet count    Hypertension:  · Will continue amlodipine but had issues with higher doses due to leg swelling  · Continue metoprolol succinate but could change to carvedilol as an opportunity to optimize blood pressure  · Had led cramps on chlorthalidone   · Consider adding spironolactone  · BMP 9/2021 demonstrates normal sodium, potassium, and creatinine     Mixed hyperlipidemia:  · Patient currently on statin therapy  · Lipid panel 9/2021 demonstrates good LDL control  · CMP 9/2021 demonstrates normal ALT and AST     History of CVA:  · Continue aspirin and statin      Follow-up:  6 months      Kingsley Zimmerman MD  [facility]  09/20/21  11:11 EDT

## 2021-10-06 ENCOUNTER — HOSPITAL ENCOUNTER (OUTPATIENT)
Dept: CARDIOLOGY | Facility: HOSPITAL | Age: 76
Discharge: HOME | End: 2021-10-06
Admitting: INTERNAL MEDICINE

## 2021-10-06 VITALS
DIASTOLIC BLOOD PRESSURE: 72 MMHG | SYSTOLIC BLOOD PRESSURE: 160 MMHG | OXYGEN SATURATION: 98 % | HEART RATE: 60 BPM | BODY MASS INDEX: 22.08 KG/M2 | WEIGHT: 120 LBS | HEIGHT: 62 IN

## 2021-10-06 DIAGNOSIS — I25.10 CORONARY ARTERY DISEASE INVOLVING NATIVE CORONARY ARTERY OF NATIVE HEART WITHOUT ANGINA PECTORIS: ICD-10-CM

## 2021-10-06 LAB
AORTIC ARCH: 2.2 CM
ASCENDING AORTA: 2.9 CM
BH CV ECHO MEAS - ACS: 1.6 CM
BH CV ECHO MEAS - AO MAX PG (FULL): 3.8 MMHG
BH CV ECHO MEAS - AO MAX PG: 9.1 MMHG
BH CV ECHO MEAS - AO MEAN PG (FULL): 3.8 MMHG
BH CV ECHO MEAS - AO MEAN PG: 5.9 MMHG
BH CV ECHO MEAS - AO ROOT AREA (BSA CORRECTED): 1.9
BH CV ECHO MEAS - AO ROOT AREA: 6.6 CM^2
BH CV ECHO MEAS - AO ROOT DIAM: 2.9 CM
BH CV ECHO MEAS - AO V2 MAX: 150.7 CM/SEC
BH CV ECHO MEAS - AO V2 MEAN: 116 CM/SEC
BH CV ECHO MEAS - AO V2 VTI: 44.9 CM
BH CV ECHO MEAS - ASC AORTA: 2.9 CM
BH CV ECHO MEAS - AVA(I,A): 1.6 CM^2
BH CV ECHO MEAS - AVA(I,D): 1.6 CM^2
BH CV ECHO MEAS - AVA(V,A): 2.1 CM^2
BH CV ECHO MEAS - AVA(V,D): 2.1 CM^2
BH CV ECHO MEAS - BSA(HAYCOCK): 1.5 M^2
BH CV ECHO MEAS - BSA: 1.5 M^2
BH CV ECHO MEAS - BZI_BMI: 21.9 KILOGRAMS/M^2
BH CV ECHO MEAS - BZI_METRIC_HEIGHT: 157.5 CM
BH CV ECHO MEAS - BZI_METRIC_WEIGHT: 54.4 KG
BH CV ECHO MEAS - EDV(CUBED): 67.3 ML
BH CV ECHO MEAS - EDV(MOD-SP2): 41 ML
BH CV ECHO MEAS - EDV(MOD-SP4): 52 ML
BH CV ECHO MEAS - EDV(TEICH): 72.9 ML
BH CV ECHO MEAS - EF(CUBED): 80.8 %
BH CV ECHO MEAS - EF(MOD-BP): 68 %
BH CV ECHO MEAS - EF(MOD-SP2): 65.9 %
BH CV ECHO MEAS - EF(MOD-SP4): 69.2 %
BH CV ECHO MEAS - EF(TEICH): 73.8 %
BH CV ECHO MEAS - ESV(CUBED): 12.9 ML
BH CV ECHO MEAS - ESV(MOD-SP2): 14 ML
BH CV ECHO MEAS - ESV(MOD-SP4): 16 ML
BH CV ECHO MEAS - ESV(TEICH): 19.1 ML
BH CV ECHO MEAS - FS: 42.3 %
BH CV ECHO MEAS - IVS/LVPW: 1.1
BH CV ECHO MEAS - IVSD: 1.1 CM
BH CV ECHO MEAS - LAT PEAK E' VEL: 7.5 CM/SEC
BH CV ECHO MEAS - LV DIASTOLIC VOL/BSA (35-75): 33.8 ML/M^2
BH CV ECHO MEAS - LV MASS(C)D: 151.9 GRAMS
BH CV ECHO MEAS - LV MASS(C)DI: 98.7 GRAMS/M^2
BH CV ECHO MEAS - LV MAX PG: 5.2 MMHG
BH CV ECHO MEAS - LV MEAN PG: 2.1 MMHG
BH CV ECHO MEAS - LV SYSTOLIC VOL/BSA (12-30): 10.4 ML/M^2
BH CV ECHO MEAS - LV V1 MAX: 114.5 CM/SEC
BH CV ECHO MEAS - LV V1 MEAN: 66.5 CM/SEC
BH CV ECHO MEAS - LV V1 VTI: 25.3 CM
BH CV ECHO MEAS - LVIDD: 4.1 CM
BH CV ECHO MEAS - LVIDS: 2.3 CM
BH CV ECHO MEAS - LVLD AP2: 6 CM
BH CV ECHO MEAS - LVLD AP4: 6.1 CM
BH CV ECHO MEAS - LVLS AP2: 4.8 CM
BH CV ECHO MEAS - LVLS AP4: 4.8 CM
BH CV ECHO MEAS - LVOT AREA (M): 2.8 CM^2
BH CV ECHO MEAS - LVOT AREA: 2.8 CM^2
BH CV ECHO MEAS - LVOT DIAM: 1.9 CM
BH CV ECHO MEAS - LVPWD: 1.1 CM
BH CV ECHO MEAS - MED PEAK E' VEL: 5.8 CM/SEC
BH CV ECHO MEAS - MV A DUR: 0.1 SEC
BH CV ECHO MEAS - MV A MAX VEL: 64.3 CM/SEC
BH CV ECHO MEAS - MV DEC SLOPE: 664.4 CM/SEC^2
BH CV ECHO MEAS - MV DEC TIME: 0.11 SEC
BH CV ECHO MEAS - MV E MAX VEL: 107 CM/SEC
BH CV ECHO MEAS - MV E/A: 1.7
BH CV ECHO MEAS - MV MAX PG: 5 MMHG
BH CV ECHO MEAS - MV MEAN PG: 1.2 MMHG
BH CV ECHO MEAS - MV P1/2T MAX VEL: 111.3 CM/SEC
BH CV ECHO MEAS - MV P1/2T: 49.1 MSEC
BH CV ECHO MEAS - MV V2 MAX: 111.3 CM/SEC
BH CV ECHO MEAS - MV V2 MEAN: 48.8 CM/SEC
BH CV ECHO MEAS - MV V2 VTI: 33.7 CM
BH CV ECHO MEAS - MVA P1/2T LCG: 2 CM^2
BH CV ECHO MEAS - MVA(P1/2T): 4.5 CM^2
BH CV ECHO MEAS - MVA(VTI): 2.1 CM^2
BH CV ECHO MEAS - PA ACC TIME: 0.06 SEC
BH CV ECHO MEAS - PA MAX PG (FULL): 3 MMHG
BH CV ECHO MEAS - PA MAX PG: 4.5 MMHG
BH CV ECHO MEAS - PA PR(ACCEL): 52.9 MMHG
BH CV ECHO MEAS - PA V2 MAX: 105.9 CM/SEC
BH CV ECHO MEAS - PULM A REVS DUR: 0.1 SEC
BH CV ECHO MEAS - PULM A REVS VEL: 26.1 CM/SEC
BH CV ECHO MEAS - PULM DIAS VEL: 67.7 CM/SEC
BH CV ECHO MEAS - PULM S/D: 0.85
BH CV ECHO MEAS - PULM SYS VEL: 57.8 CM/SEC
BH CV ECHO MEAS - PVA(V,A): 1.2 CM^2
BH CV ECHO MEAS - PVA(V,D): 1.2 CM^2
BH CV ECHO MEAS - QP/QS: 0.55
BH CV ECHO MEAS - RAP SYSTOLE: 3 MMHG
BH CV ECHO MEAS - RV MAX PG: 1.5 MMHG
BH CV ECHO MEAS - RV MEAN PG: 1 MMHG
BH CV ECHO MEAS - RV V1 MAX: 60.8 CM/SEC
BH CV ECHO MEAS - RV V1 MEAN: 47.8 CM/SEC
BH CV ECHO MEAS - RV V1 VTI: 18.7 CM
BH CV ECHO MEAS - RVOT AREA: 2 CM^2
BH CV ECHO MEAS - RVOT DIAM: 1.6 CM
BH CV ECHO MEAS - RVSP: 28 MMHG
BH CV ECHO MEAS - SI(AO): 192.8 ML/M^2
BH CV ECHO MEAS - SI(CUBED): 35.4 ML/M^2
BH CV ECHO MEAS - SI(LVOT): 45.3 ML/M^2
BH CV ECHO MEAS - SI(MOD-SP2): 17.5 ML/M^2
BH CV ECHO MEAS - SI(MOD-SP4): 23.4 ML/M^2
BH CV ECHO MEAS - SI(TEICH): 35 ML/M^2
BH CV ECHO MEAS - SUP REN AO DIAM: 1.6 CM
BH CV ECHO MEAS - SV(AO): 296.6 ML
BH CV ECHO MEAS - SV(CUBED): 54.4 ML
BH CV ECHO MEAS - SV(LVOT): 69.6 ML
BH CV ECHO MEAS - SV(MOD-SP2): 27 ML
BH CV ECHO MEAS - SV(MOD-SP4): 36 ML
BH CV ECHO MEAS - SV(RVOT): 38 ML
BH CV ECHO MEAS - SV(TEICH): 53.8 ML
BH CV ECHO MEAS - TAPSE (>1.6): 1.7 CM
BH CV ECHO MEAS - TR MAX VEL: 248.4 CM/SEC
BH CV ECHO MEASUREMENTS AVERAGE E/E' RATIO: 16.09
BH CV VAS BP RIGHT ARM: NORMAL MMHG
BH CV XLRA - RV BASE: 2.7 CM
BH CV XLRA - RV LENGTH: 6.3 CM
BH CV XLRA - RV MID: 2.6 CM
BH CV XLRA - TDI S': 10.9 CM/SEC
LEFT ATRIUM VOLUME INDEX: 27 ML/M2
MAXIMAL PREDICTED HEART RATE: 146 BPM
SINUS: 2.6 CM
STJ: 2.9 CM
STRESS TARGET HR: 124 BPM

## 2021-10-06 PROCEDURE — 93306 TTE W/DOPPLER COMPLETE: CPT | Performed by: INTERNAL MEDICINE

## 2021-10-06 PROCEDURE — 93306 TTE W/DOPPLER COMPLETE: CPT

## 2021-10-07 ENCOUNTER — TELEPHONE (OUTPATIENT)
Dept: CARDIOLOGY | Facility: CLINIC | Age: 76
End: 2021-10-07

## 2021-10-07 NOTE — TELEPHONE ENCOUNTER
Please let her know that her echocardiogram is stable.  Her heart is squeezing well.  She has mild leaking of the aortic and tricuspid valves.  Please also ask her how her blood pressures have been.

## 2021-10-07 NOTE — TELEPHONE ENCOUNTER
Results reviewed and patient verbalized understanding. Denies further questions.      Zoey,  The patient saw the urologist on 10/1 and was started on myrbetrig.  Urologist told her this would raise her BP about 5 points.  Since she started the med, her BP has stayed around 126-130/60s.  Today her BP was 141/79 HR 51 and she wants you to know she wasn't resting when she checked it.      Beverley Caballero RN  Triage, [facility]

## 2021-12-17 ENCOUNTER — APPOINTMENT (OUTPATIENT)
Dept: WOMENS IMAGING | Facility: HOSPITAL | Age: 76
End: 2021-12-17

## 2021-12-17 PROCEDURE — 77063 BREAST TOMOSYNTHESIS BI: CPT | Performed by: RADIOLOGY

## 2021-12-17 PROCEDURE — 77067 SCR MAMMO BI INCL CAD: CPT | Performed by: RADIOLOGY

## 2022-02-14 RX ORDER — ATORVASTATIN CALCIUM 40 MG/1
40 TABLET, FILM COATED ORAL DAILY
Qty: 90 TABLET | Refills: 3 | Status: SHIPPED | OUTPATIENT
Start: 2022-02-14 | End: 2023-02-14

## 2022-03-23 ENCOUNTER — OFFICE VISIT (OUTPATIENT)
Dept: CARDIOLOGY | Facility: CLINIC | Age: 77
End: 2022-03-23

## 2022-03-23 VITALS
HEART RATE: 51 BPM | WEIGHT: 115 LBS | DIASTOLIC BLOOD PRESSURE: 74 MMHG | HEIGHT: 62 IN | SYSTOLIC BLOOD PRESSURE: 128 MMHG | BODY MASS INDEX: 21.16 KG/M2

## 2022-03-23 DIAGNOSIS — Z86.73 HISTORY OF CVA (CEREBROVASCULAR ACCIDENT): ICD-10-CM

## 2022-03-23 DIAGNOSIS — I25.10 CORONARY ARTERY DISEASE INVOLVING NATIVE CORONARY ARTERY OF NATIVE HEART WITHOUT ANGINA PECTORIS: Primary | ICD-10-CM

## 2022-03-23 DIAGNOSIS — E78.2 MIXED HYPERLIPIDEMIA: ICD-10-CM

## 2022-03-23 DIAGNOSIS — Z95.820 S/P ANGIOPLASTY WITH STENT: ICD-10-CM

## 2022-03-23 DIAGNOSIS — G89.29 CHRONIC CHEST PAIN WITH HIGH RISK FOR CAD: ICD-10-CM

## 2022-03-23 DIAGNOSIS — K21.9 GASTROESOPHAGEAL REFLUX DISEASE WITHOUT ESOPHAGITIS: ICD-10-CM

## 2022-03-23 DIAGNOSIS — R07.9 CHRONIC CHEST PAIN WITH HIGH RISK FOR CAD: ICD-10-CM

## 2022-03-23 DIAGNOSIS — I10 ESSENTIAL HYPERTENSION: ICD-10-CM

## 2022-03-23 DIAGNOSIS — Z91.89 CHRONIC CHEST PAIN WITH HIGH RISK FOR CAD: ICD-10-CM

## 2022-03-23 PROCEDURE — 99214 OFFICE O/P EST MOD 30 MIN: CPT | Performed by: NURSE PRACTITIONER

## 2022-03-23 PROCEDURE — 93000 ELECTROCARDIOGRAM COMPLETE: CPT | Performed by: NURSE PRACTITIONER

## 2022-03-23 RX ORDER — AMLODIPINE BESYLATE 5 MG/1
5 TABLET ORAL DAILY
Qty: 90 TABLET | Refills: 3 | Status: SHIPPED | OUTPATIENT
Start: 2022-03-23 | End: 2022-07-18

## 2022-03-23 RX ORDER — METOPROLOL SUCCINATE 50 MG/1
50 TABLET, EXTENDED RELEASE ORAL DAILY
Qty: 90 TABLET | Refills: 3 | Status: SHIPPED | OUTPATIENT
Start: 2022-03-23 | End: 2023-02-14

## 2022-03-23 NOTE — PROGRESS NOTES
Date of Office Visit: 2022  Encounter Provider: WALTER Atkins  Place of Service: [facility] CARDIOLOGY  Patient Name: Della Vines  :1945    Chief Complaint   Patient presents with   • Coronary Artery Disease   :     HPI: Della Vines is a 76 y.o. female who is a patient of  Dr. Zimmerman and is new to me today. She has a history of coronary disease and is s/p PCI with GERA to the Lad 2020 after abnormal stress test and angina with exertion and calculated EF of 22%.  Echocardiogram in 2021 showed an EF of 68%.  There is some grade 1 diastolic dysfunction with mild LVH.  She was last in the office in September.  She had been doing well and previously her chlorthalidone had to be stopped for cramping at night.  Unfortunately her blood pressure did increase.  We kept her on amlodipine and she is here for follow-up.    Her blood pressure has been doing well.  In the office today it is 128/79.  She has noticed occasionally that she gets a little bit of pressure in the substernal chest.  Its not really brought on by activity.  But if she has to walk a long distance sometimes she gets short of air.  She follows up with her primary care doctor regularly her recent lipid panel was at goal.  Previous testing and notes have been reviewed by me.   Past Medical History:   Diagnosis Date   • Abnormal stress test 2020    Added automatically from request for surgery [id number]   • Acid reflux disease    • Bell's palsy    • Cerebellar infarction ([facility])    • Chest pain    • CVA (cerebral vascular accident) ([facility]) 2014    Overview:  Per old records in distant past in internal capsule   • Hyperlipidemia    • Hypertension    • Lung disease    • Osteoporosis    • PAF (paroxysmal atrial fibrillation) ([facility])    • Stroke ([facility])    • TIA (transient ischemic attack)    • Urinary tract infection        Past Surgical History:   Procedure Laterality Date   • ADENOIDECTOMY     •  APPENDECTOMY     • BREAST FIBROADENOMA SURGERY     • CARDIAC CATHETERIZATION N/A 2020    Procedure: Left Heart Cath;  Surgeon: Kingsley Zimmerman MD;  Location:  [facility] CATH INVASIVE LOCATION;  Service: Cardiovascular   • CARDIAC CATHETERIZATION N/A 2020    Procedure: Coronary angiography;  Surgeon: Kingsley Zimmerman MD;  Location:  [facility] CATH INVASIVE LOCATION;  Service: Cardiovascular   • CARDIAC CATHETERIZATION N/A 2020    Procedure: Left ventriculography;  Surgeon: Kingsley Zimmerman MD;  Location:  [facility] CATH INVASIVE LOCATION;  Service: Cardiovascular   • CARDIAC CATHETERIZATION N/A 2020    Procedure: Percutaneous Coronary Intervention;  Surgeon: Kingsley Zimmerman MD;  Location:  [facility] CATH INVASIVE LOCATION;  Service: Cardiovascular   • CARDIAC CATHETERIZATION N/A 2020    Procedure: Stent GERA coronary;  Surgeon: Kingsley Zimmerman MD;  Location:  [facility] CATH INVASIVE LOCATION;  Service: Cardiovascular   • HYSTERECTOMY     • MASTOID SURGERY     • TONSILLECTOMY         Social History     Socioeconomic History   • Marital status:      Spouse name: Tyrone Vines   • Number of children: 1   • Years of education: 14   Tobacco Use   • Smoking status: Former Smoker     Packs/day: 1.50     Years: 30.00     Pack years: 45.00     Start date:      Quit date:      Years since quittin.2   • Smokeless tobacco: Never Used   • Tobacco comment: caffeine use: 1 CUP COFFEE DAILY    Substance and Sexual Activity   • Alcohol use: No   • Drug use: No   • Sexual activity: Defer       Family History   Problem Relation Age of Onset   • Heart failure Father    • Hypertension Father    • Heart disease Father    • Breast cancer Mother    • Aneurysm Daughter        Review of Systems   Constitutional: Negative for diaphoresis and malaise/fatigue.   Cardiovascular: Positive for chest pain. Negative for claudication, dyspnea on exertion, irregular heartbeat, leg swelling, near-syncope, orthopnea,  "palpitations, paroxysmal nocturnal dyspnea and syncope.   Respiratory: Negative for cough, shortness of breath and sleep disturbances due to breathing.    Musculoskeletal: Negative for falls.   Neurological: Negative for dizziness and weakness.   Psychiatric/Behavioral: Negative for altered mental status and substance abuse.       Allergies   Allergen Reactions   • Codeine    • Erythromycin Diarrhea     Side effect    • Iodine Hives   • Lisinopril Other (See Comments)     COUGH   • Morphine Itching and Delirium   • Moxifloxacin      Elevated liver enzymes   • Sulfa Antibiotics    • Pneumococcal Vaccines Swelling and Rash         Current Outpatient Medications:   •  amLODIPine (NORVASC) 5 MG tablet, Take 1 tablet by mouth Daily., Disp: 90 tablet, Rfl: 3  •  aspirin (aspirin) 81 MG EC tablet, Take 1 tablet by mouth Daily., Disp: 90 tablet, Rfl: 3  •  atorvastatin (LIPITOR) 40 MG tablet, TAKE 1 TABLET BY MOUTH DAILY, Disp: 90 tablet, Rfl: 3  •  calcium carbonate (OS-JERRELL) 1250 (500 Ca) MG tablet, Take 1 tablet by mouth., Disp: , Rfl:   •  Cholecalciferol 2000 units tablet, Take 4,000 Int'l Units by mouth., Disp: , Rfl:   •  metoprolol succinate XL (TOPROL-XL) 50 MG 24 hr tablet, Take 1 tablet by mouth Daily., Disp: 90 tablet, Rfl: 3  •  nitroglycerin (NITROSTAT) 0.4 MG SL tablet, Place 1 tablet under the tongue Every 5 (Five) Minutes As Needed for Chest Pain. Place one tablet under tongue as needed for chest pain., Disp: 25 tablet, Rfl: 3  •  pantoprazole (PROTONIX) 40 MG EC tablet, TAKE 1 TABLET BY MOUTH EVERY MORNING, Disp: 90 tablet, Rfl: 3  •  topiramate (TOPAMAX) 25 MG tablet, TK 1 T PO BID, Disp: , Rfl: 3      Objective:     Vitals:    03/23/22 1410   BP: 128/74   Pulse: 51   Weight: 52.2 kg (115 lb)   Height: 157.5 cm (62\")     Body mass index is 21.03 kg/m².    PHYSICAL EXAM:    Constitutional:       General: Not in acute distress.     Appearance: Normal appearance. Well-developed.   Eyes:      Pupils: Pupils " are equal, round, and reactive to light.   HENT:      Head: Normocephalic.   Neck:      Vascular: No carotid bruit or JVD.   Pulmonary:      Effort: Pulmonary effort is normal. No tachypnea.      Breath sounds: Normal breath sounds. No wheezing. No rales.   Cardiovascular:      Normal rate. Regular rhythm.      No gallop.   Pulses:     Intact distal pulses.   Abdominal:      General: Bowel sounds are normal.      Palpations: Abdomen is soft.      Tenderness: There is no abdominal tenderness.   Musculoskeletal: Normal range of motion.      Cervical back: Normal range of motion and neck supple. No edema. Skin:     General: Skin is warm and dry.   Neurological:      Mental Status: Alert and oriented to person, place, and time.           ECG 12 Lead    Date/Time: 3/23/2022 2:38 PM  Performed by: Dixie Kemp APRN  Authorized by: Dixie Kemp APRN   Comparison: compared with previous ECG   Rhythm: sinus rhythm  Rate: normal  QRS axis: normal    Clinical impression: normal ECG              Assessment:       Diagnosis Plan   1. Coronary artery disease involving native coronary artery of native heart without angina pectoris  Stress Test With Myocardial Perfusion One Day   2. Essential hypertension     3. Mixed hyperlipidemia     4. History of CVA (cerebrovascular accident)     5. S/P angioplasty with stent     6. Gastroesophageal reflux disease without esophagitis     7. Chronic chest pain with high risk for CAD  Stress Test With Myocardial Perfusion One Day     Orders Placed This Encounter   Procedures   • Stress Test With Myocardial Perfusion One Day     Standing Status:   Future     Standing Expiration Date:   3/23/2023     Order Specific Question:   What stress agent will be used?     Answer:   Exercise with possible pharmacologic     Order Specific Question:   Reason for exam?     Answer:   Chest Pain     Order Specific Question:   Release to patient     Answer:   Immediate          Plan:       I am not can  make any changes to her medicines but I am going to schedule her for stress test.  She is getting ready to start going to a gym for workout classes.  I think this is great and a good risk factor modification however let us get a stress test before that starts due to a little bit of chest discomfort that she has been having.  She can follow-up in 6 months sooner if needed and I will call her with test results.         Your medication list          Accurate as of March 23, 2022  2:37 PM. If you have any questions, ask your nurse or doctor.            CONTINUE taking these medications      Instructions Last Dose Given Next Dose Due   amLODIPine 5 MG tablet  Commonly known as: NORVASC      Take 1 tablet by mouth Daily.       aspirin 81 MG EC tablet      Take 1 tablet by mouth Daily.       atorvastatin 40 MG tablet  Commonly known as: LIPITOR      TAKE 1 TABLET BY MOUTH DAILY       calcium carbonate 1250 (500 Ca) MG tablet  Commonly known as: OS-JERRELL      Take 1 tablet by mouth.       Cholecalciferol 50 MCG (2000 UT) tablet      Take 4,000 Int'l Units by mouth.       metoprolol succinate XL 50 MG 24 hr tablet  Commonly known as: TOPROL-XL      Take 1 tablet by mouth Daily.       nitroglycerin 0.4 MG SL tablet  Commonly known as: NITROSTAT      Place 1 tablet under the tongue Every 5 (Five) Minutes As Needed for Chest Pain. Place one tablet under tongue as needed for chest pain.       pantoprazole 40 MG EC tablet  Commonly known as: PROTONIX      TAKE 1 TABLET BY MOUTH EVERY MORNING       topiramate 25 MG tablet  Commonly known as: TOPAMAX      TK 1 T PO BID             Where to Get Your Medications      These medications were sent to [id number] - [place], [place] - [address] AT [facility] [address] [facility] - [phone] [facility] [phone]   [address], [facility] [address]    Phone: [phone]   · amLODIPine 5 MG tablet  · metoprolol succinate XL 50 MG 24 hr tablet           As  always, it has been a pleasure to participate in your patient's care.      Sincerely,     Dixie WATSON

## 2022-04-04 ENCOUNTER — LAB (OUTPATIENT)
Dept: LAB | Facility: HOSPITAL | Age: 77
End: 2022-04-04

## 2022-04-04 ENCOUNTER — HOSPITAL ENCOUNTER (OUTPATIENT)
Dept: CARDIOLOGY | Facility: HOSPITAL | Age: 77
Discharge: HOME | End: 2022-04-04

## 2022-04-04 ENCOUNTER — TRANSCRIBE ORDERS (OUTPATIENT)
Dept: CARDIOLOGY | Facility: CLINIC | Age: 77
End: 2022-04-04

## 2022-04-04 ENCOUNTER — HOSPITAL ENCOUNTER (OUTPATIENT)
Facility: HOSPITAL | Age: 77
Setting detail: HOSPITAL OUTPATIENT SURGERY
Discharge: HOME | End: 2022-04-04
Attending: INTERNAL MEDICINE | Admitting: INTERNAL MEDICINE

## 2022-04-04 VITALS
DIASTOLIC BLOOD PRESSURE: 86 MMHG | OXYGEN SATURATION: 93 % | TEMPERATURE: 97.1 F | RESPIRATION RATE: 16 BRPM | SYSTOLIC BLOOD PRESSURE: 156 MMHG | HEIGHT: 63 IN | BODY MASS INDEX: 20.55 KG/M2 | HEART RATE: 69 BPM | WEIGHT: 116 LBS

## 2022-04-04 VITALS
SYSTOLIC BLOOD PRESSURE: 194 MMHG | HEART RATE: 69 BPM | RESPIRATION RATE: 20 BRPM | DIASTOLIC BLOOD PRESSURE: 87 MMHG | OXYGEN SATURATION: 98 %

## 2022-04-04 DIAGNOSIS — I25.119 CORONARY ARTERY DISEASE INVOLVING NATIVE CORONARY ARTERY OF NATIVE HEART WITH ANGINA PECTORIS: Primary | ICD-10-CM

## 2022-04-04 DIAGNOSIS — R07.9 CHRONIC CHEST PAIN WITH HIGH RISK FOR CAD: ICD-10-CM

## 2022-04-04 DIAGNOSIS — G89.29 CHRONIC CHEST PAIN WITH HIGH RISK FOR CAD: ICD-10-CM

## 2022-04-04 DIAGNOSIS — R07.2 PRECORDIAL PAIN: ICD-10-CM

## 2022-04-04 DIAGNOSIS — Z95.820 S/P ANGIOPLASTY WITH STENT: Primary | ICD-10-CM

## 2022-04-04 DIAGNOSIS — Z01.818 OTHER SPECIFIED PRE-OPERATIVE EXAMINATION: Primary | ICD-10-CM

## 2022-04-04 DIAGNOSIS — I25.10 CORONARY ARTERY DISEASE INVOLVING NATIVE CORONARY ARTERY OF NATIVE HEART WITHOUT ANGINA PECTORIS: ICD-10-CM

## 2022-04-04 DIAGNOSIS — Z91.89 CHRONIC CHEST PAIN WITH HIGH RISK FOR CAD: ICD-10-CM

## 2022-04-04 DIAGNOSIS — I10 ESSENTIAL HYPERTENSION: ICD-10-CM

## 2022-04-04 DIAGNOSIS — R00.2 PALPITATIONS: ICD-10-CM

## 2022-04-04 DIAGNOSIS — I25.119 CORONARY ARTERY DISEASE INVOLVING NATIVE CORONARY ARTERY OF NATIVE HEART WITH ANGINA PECTORIS: ICD-10-CM

## 2022-04-04 DIAGNOSIS — R06.02 SHORTNESS OF BREATH: ICD-10-CM

## 2022-04-04 DIAGNOSIS — Z01.818 OTHER SPECIFIED PRE-OPERATIVE EXAMINATION: ICD-10-CM

## 2022-04-04 LAB
ALBUMIN SERPL-MCNC: 5 G/DL (ref 3.5–5.2)
ALBUMIN/GLOB SERPL: 1.9 G/DL
ALP SERPL-CCNC: 97 U/L (ref 39–117)
ALT SERPL W P-5'-P-CCNC: 21 U/L (ref 1–33)
ANION GAP SERPL CALCULATED.3IONS-SCNC: 12.5 MMOL/L (ref 5–15)
AST SERPL-CCNC: 21 U/L (ref 1–32)
BASOPHILS # BLD AUTO: 0.06 10*3/MM3 (ref 0–0.2)
BASOPHILS NFR BLD AUTO: 0.8 % (ref 0–1.5)
BH CV NUCLEAR PRIOR STUDY: 1
BH CV REST NUCLEAR ISOTOPE DOSE: 11.6 MCI
BH CV STRESS BP STAGE 1: NORMAL
BH CV STRESS BP STAGE 2: NORMAL
BH CV STRESS BP STAGE 3: NORMAL
BH CV STRESS BP STAGE 4: NORMAL
BH CV STRESS DURATION MIN STAGE 1: 3
BH CV STRESS DURATION MIN STAGE 2: 2
BH CV STRESS DURATION SEC STAGE 1: 0
BH CV STRESS DURATION SEC STAGE 2: 0
BH CV STRESS GRADE STAGE 1: 10
BH CV STRESS GRADE STAGE 2: 12
BH CV STRESS HR STAGE 1: 124
BH CV STRESS HR STAGE 2: 136
BH CV STRESS METS STAGE 1: 5
BH CV STRESS METS STAGE 2: 6
BH CV STRESS NUCLEAR ISOTOPE DOSE: 33.8 MCI
BH CV STRESS PROTOCOL 1: NORMAL
BH CV STRESS RECOVERY BP: NORMAL MMHG
BH CV STRESS RECOVERY HR: 82 BPM
BH CV STRESS SPEED STAGE 1: 1.7
BH CV STRESS SPEED STAGE 2: 2.5
BH CV STRESS STAGE 1: 1
BH CV STRESS STAGE 2: 2
BH CV STRESS STAGE 3: NORMAL
BH CV STRESS STAGE 4: NORMAL
BILIRUB SERPL-MCNC: 0.5 MG/DL (ref 0–1.2)
BUN SERPL-MCNC: 11 MG/DL (ref 8–23)
BUN/CREAT SERPL: 14.7 (ref 7–25)
CALCIUM SPEC-SCNC: 9.7 MG/DL (ref 8.6–10.5)
CHLORIDE SERPL-SCNC: 104 MMOL/L (ref 98–107)
CO2 SERPL-SCNC: 24.5 MMOL/L (ref 22–29)
CREAT SERPL-MCNC: 0.75 MG/DL (ref 0.57–1)
D DIMER PPP FEU-MCNC: 0.35 MCGFEU/ML (ref 0–0.49)
DEPRECATED RDW RBC AUTO: 37.8 FL (ref 37–54)
EGFRCR SERPLBLD CKD-EPI 2021: 83.1 ML/MIN/1.73
EOSINOPHIL # BLD AUTO: 0.18 10*3/MM3 (ref 0–0.4)
EOSINOPHIL NFR BLD AUTO: 2.4 % (ref 0.3–6.2)
ERYTHROCYTE [DISTWIDTH] IN BLOOD BY AUTOMATED COUNT: 12 % (ref 12.3–15.4)
GLOBULIN UR ELPH-MCNC: 2.7 GM/DL
GLUCOSE SERPL-MCNC: 155 MG/DL (ref 65–99)
HCT VFR BLD AUTO: 42.4 % (ref 34–46.6)
HGB BLD-MCNC: 14.5 G/DL (ref 12–15.9)
IMM GRANULOCYTES # BLD AUTO: 0.03 10*3/MM3 (ref 0–0.05)
IMM GRANULOCYTES NFR BLD AUTO: 0.4 % (ref 0–0.5)
LV EF NUC BP: 76 %
LYMPHOCYTES # BLD AUTO: 1.3 10*3/MM3 (ref 0.7–3.1)
LYMPHOCYTES NFR BLD AUTO: 17.4 % (ref 19.6–45.3)
MAXIMAL PREDICTED HEART RATE: 145 BPM
MCH RBC QN AUTO: 29.7 PG (ref 26.6–33)
MCHC RBC AUTO-ENTMCNC: 34.2 G/DL (ref 31.5–35.7)
MCV RBC AUTO: 86.7 FL (ref 79–97)
MONOCYTES # BLD AUTO: 0.36 10*3/MM3 (ref 0.1–0.9)
MONOCYTES NFR BLD AUTO: 4.8 % (ref 5–12)
NEUTROPHILS NFR BLD AUTO: 5.52 10*3/MM3 (ref 1.7–7)
NEUTROPHILS NFR BLD AUTO: 74.2 % (ref 42.7–76)
NRBC BLD AUTO-RTO: 0 /100 WBC (ref 0–0.2)
NT-PROBNP SERPL-MCNC: 254 PG/ML (ref 0–1800)
PERCENT MAX PREDICTED HR: 93.79 %
PLATELET # BLD AUTO: 273 10*3/MM3 (ref 140–450)
PMV BLD AUTO: 10.1 FL (ref 6–12)
POTASSIUM SERPL-SCNC: 3.6 MMOL/L (ref 3.5–5.2)
PROT SERPL-MCNC: 7.7 G/DL (ref 6–8.5)
RBC # BLD AUTO: 4.89 10*6/MM3 (ref 3.77–5.28)
SARS-COV-2 RNA PNL SPEC NAA+PROBE: NOT DETECTED
SODIUM SERPL-SCNC: 141 MMOL/L (ref 136–145)
STRESS BASELINE BP: NORMAL MMHG
STRESS BASELINE HR: 83 BPM
STRESS PERCENT HR: 110 %
STRESS POST ESTIMATED WORKLOAD: 6 METS
STRESS POST EXERCISE DUR MIN: 5 MIN
STRESS POST EXERCISE DUR SEC: 0 SEC
STRESS POST PEAK BP: NORMAL MMHG
STRESS POST PEAK HR: 136 BPM
STRESS TARGET HR: 123 BPM
TROPONIN T SERPL-MCNC: <0.01 NG/ML (ref 0–0.03)
WBC NRBC COR # BLD: 7.45 10*3/MM3 (ref 3.4–10.8)

## 2022-04-04 PROCEDURE — 80053 COMPREHEN METABOLIC PANEL: CPT

## 2022-04-04 PROCEDURE — 83880 ASSAY OF NATRIURETIC PEPTIDE: CPT | Performed by: INTERNAL MEDICINE

## 2022-04-04 PROCEDURE — 93458 L HRT ARTERY/VENTRICLE ANGIO: CPT | Performed by: INTERNAL MEDICINE

## 2022-04-04 PROCEDURE — 36415 COLL VENOUS BLD VENIPUNCTURE: CPT

## 2022-04-04 PROCEDURE — 93018 CV STRESS TEST I&R ONLY: CPT | Performed by: INTERNAL MEDICINE

## 2022-04-04 PROCEDURE — 85379 FIBRIN DEGRADATION QUANT: CPT | Performed by: INTERNAL MEDICINE

## 2022-04-04 PROCEDURE — 25010000002 HEPARIN (PORCINE) PER 1000 UNITS: Performed by: INTERNAL MEDICINE

## 2022-04-04 PROCEDURE — A9502 TC99M TETROFOSMIN: HCPCS | Performed by: NURSE PRACTITIONER

## 2022-04-04 PROCEDURE — 93017 CV STRESS TEST TRACING ONLY: CPT

## 2022-04-04 PROCEDURE — 78452 HT MUSCLE IMAGE SPECT MULT: CPT

## 2022-04-04 PROCEDURE — 85025 COMPLETE CBC W/AUTO DIFF WBC: CPT

## 2022-04-04 PROCEDURE — 78452 HT MUSCLE IMAGE SPECT MULT: CPT | Performed by: INTERNAL MEDICINE

## 2022-04-04 PROCEDURE — 84484 ASSAY OF TROPONIN QUANT: CPT | Performed by: INTERNAL MEDICINE

## 2022-04-04 PROCEDURE — C9803 HOPD COVID-19 SPEC COLLECT: HCPCS

## 2022-04-04 PROCEDURE — 25010000002 FENTANYL CITRATE (PF) 50 MCG/ML SOLUTION: Performed by: INTERNAL MEDICINE

## 2022-04-04 PROCEDURE — 99214 OFFICE O/P EST MOD 30 MIN: CPT | Performed by: INTERNAL MEDICINE

## 2022-04-04 PROCEDURE — C1894 INTRO/SHEATH, NON-LASER: HCPCS | Performed by: INTERNAL MEDICINE

## 2022-04-04 PROCEDURE — 87635 SARS-COV-2 COVID-19 AMP PRB: CPT

## 2022-04-04 PROCEDURE — 25010000002 DIAZEPAM PER 5 MG: Performed by: INTERNAL MEDICINE

## 2022-04-04 PROCEDURE — 25010000002 METHYLPREDNISOLONE PER 125 MG: Performed by: INTERNAL MEDICINE

## 2022-04-04 PROCEDURE — 93016 CV STRESS TEST SUPVJ ONLY: CPT | Performed by: INTERNAL MEDICINE

## 2022-04-04 PROCEDURE — 0 IOPAMIDOL PER 1 ML: Performed by: INTERNAL MEDICINE

## 2022-04-04 PROCEDURE — 0 TECHNETIUM TETROFOSMIN KIT: Performed by: NURSE PRACTITIONER

## 2022-04-04 PROCEDURE — 94760 N-INVAS EAR/PLS OXIMETRY 1: CPT

## 2022-04-04 PROCEDURE — C1769 GUIDE WIRE: HCPCS | Performed by: INTERNAL MEDICINE

## 2022-04-04 PROCEDURE — 25010000002 MIDAZOLAM PER 1 MG: Performed by: INTERNAL MEDICINE

## 2022-04-04 RX ORDER — FENTANYL CITRATE 50 UG/ML
INJECTION, SOLUTION INTRAMUSCULAR; INTRAVENOUS AS NEEDED
Status: DISCONTINUED | OUTPATIENT
Start: 2022-04-04 | End: 2022-04-04 | Stop reason: HOSPADM

## 2022-04-04 RX ORDER — METOPROLOL TARTRATE 5 MG/5ML
5 INJECTION INTRAVENOUS ONCE
Status: COMPLETED | OUTPATIENT
Start: 2022-04-04 | End: 2022-04-04

## 2022-04-04 RX ORDER — SODIUM CHLORIDE 0.9 % (FLUSH) 0.9 %
10 SYRINGE (ML) INJECTION EVERY 12 HOURS SCHEDULED
Status: DISCONTINUED | OUTPATIENT
Start: 2022-04-04 | End: 2022-04-04 | Stop reason: HOSPADM

## 2022-04-04 RX ORDER — METHYLPREDNISOLONE SODIUM SUCCINATE 125 MG/2ML
125 INJECTION, POWDER, LYOPHILIZED, FOR SOLUTION INTRAMUSCULAR; INTRAVENOUS ONCE
Status: COMPLETED | OUTPATIENT
Start: 2022-04-04 | End: 2022-04-04

## 2022-04-04 RX ORDER — SODIUM CHLORIDE 9 MG/ML
75 INJECTION, SOLUTION INTRAVENOUS CONTINUOUS
Status: DISCONTINUED | OUTPATIENT
Start: 2022-04-04 | End: 2022-04-04 | Stop reason: HOSPADM

## 2022-04-04 RX ORDER — SODIUM CHLORIDE 0.9 % (FLUSH) 0.9 %
10 SYRINGE (ML) INJECTION AS NEEDED
Status: DISCONTINUED | OUTPATIENT
Start: 2022-04-04 | End: 2022-04-04 | Stop reason: HOSPADM

## 2022-04-04 RX ORDER — NITROGLYCERIN 0.4 MG/1
0.4 TABLET SUBLINGUAL
Status: SHIPPED | OUTPATIENT
Start: 2022-04-04

## 2022-04-04 RX ORDER — ACETAMINOPHEN 325 MG/1
650 TABLET ORAL EVERY 4 HOURS PRN
Status: DISCONTINUED | OUTPATIENT
Start: 2022-04-04 | End: 2022-04-04 | Stop reason: HOSPADM

## 2022-04-04 RX ORDER — DIAZEPAM 5 MG/ML
5 INJECTION, SOLUTION INTRAMUSCULAR; INTRAVENOUS ONCE
Status: COMPLETED | OUTPATIENT
Start: 2022-04-04 | End: 2022-04-04

## 2022-04-04 RX ORDER — LIDOCAINE HYDROCHLORIDE 20 MG/ML
INJECTION, SOLUTION INFILTRATION; PERINEURAL AS NEEDED
Status: DISCONTINUED | OUTPATIENT
Start: 2022-04-04 | End: 2022-04-04 | Stop reason: HOSPADM

## 2022-04-04 RX ORDER — MIDAZOLAM HYDROCHLORIDE 1 MG/ML
INJECTION INTRAMUSCULAR; INTRAVENOUS AS NEEDED
Status: DISCONTINUED | OUTPATIENT
Start: 2022-04-04 | End: 2022-04-04 | Stop reason: HOSPADM

## 2022-04-04 RX ORDER — SODIUM CHLORIDE 0.9 % (FLUSH) 0.9 %
10 SYRINGE (ML) INJECTION AS NEEDED
Status: SHIPPED | OUTPATIENT
Start: 2022-04-04

## 2022-04-04 RX ADMIN — METOPROLOL TARTRATE 5 MG: 5 INJECTION INTRAVENOUS at 13:50

## 2022-04-04 RX ADMIN — SODIUM CHLORIDE 75 ML/HR: 9 INJECTION, SOLUTION INTRAVENOUS at 16:22

## 2022-04-04 RX ADMIN — METOPROLOL TARTRATE 5 MG: 5 INJECTION INTRAVENOUS at 15:15

## 2022-04-04 RX ADMIN — TETROFOSMIN 1 DOSE: 1.38 INJECTION, POWDER, LYOPHILIZED, FOR SOLUTION INTRAVENOUS at 11:03

## 2022-04-04 RX ADMIN — TETROFOSMIN 1 DOSE: 1.38 INJECTION, POWDER, LYOPHILIZED, FOR SOLUTION INTRAVENOUS at 11:50

## 2022-04-04 RX ADMIN — METHYLPREDNISOLONE SODIUM SUCCINATE 125 MG: 125 INJECTION, POWDER, FOR SOLUTION INTRAMUSCULAR; INTRAVENOUS at 16:36

## 2022-04-04 RX ADMIN — DIAZEPAM 5 MG: 5 INJECTION, SOLUTION INTRAMUSCULAR; INTRAVENOUS at 16:35

## 2022-04-04 NOTE — ADDENDUM NOTE
Encounter addended by: Saloni Siegel RN on: 4/4/2022 3:36 PM   Actions taken: Visit diagnoses modified, Order list changed, Diagnosis association updated, MAR administration accepted

## 2022-04-04 NOTE — PROGRESS NOTES
Date of Office Visit: 2022  Encounter Provider: Dheeraj Huber MD  Place of Service: [facility] CARDIOLOGY  Patient Name: Della Vines  :1945    Chief complaint: Exertional chest pressure, shortness of breath, coronary artery disease.    History of Present Illness:    I had the pleasure of seeing the patient in our [facility] unit on 2022.  She is a very pleasant 76 year-old female with a history of coronary artery disease who normally follows with Dr. Zimmerman in our office.    The patient has a history of coronary artery disease, and underwent placement of 2 drug-eluting stents to the proximal to mid LAD on 2020.  Her ejection fraction was also reduced at the time, although improved to 68% by echo on 10/6/2021.     The patient has had approximately 3 weeks of increasing dyspnea on exertion and chest pressure with exertion.  The symptoms typically resolve with rest.  She does state that these have been getting worse recently, and the symptoms typically resolve after several minutes.  She presented for stress test today, and had 9 out of 10 chest pressure on the treadmill.  She did get fairly hypertensive with systolic readings above 200, although she had not taken her blood pressure medications this morning.  The scintigraphic images were normal, although the EKG did show inferolateral ST depression with exertion.  She was sent to the [facility] for ongoing chest discomfort.  She is currently chest pain-free.  Her labs were unremarkable.  She did get 2 doses of IV metoprolol 5 mg while in the [facility] for her hypertension.    Past Medical History:   Diagnosis Date   • Abnormal stress test 2020    Added automatically from request for surgery [id number]   • Acid reflux disease    • Bell's palsy    • Bronchiectasis without complication ([facility])     Dr Campbell   • Cerebellar infarction ([facility])    • Chest pain    • CVA (cerebral vascular accident) ([facility]) 2014    Overview:  Per  old records in distant past in internal capsule   • Hyperlipidemia    • Hypertension    • Lung disease    • Osteoporosis    • PAF (paroxysmal atrial fibrillation) (HCC)    • Stroke (HCC)    • TIA (transient ischemic attack)    • Urinary tract infection        Past Surgical History:   Procedure Laterality Date   • ADENOIDECTOMY     • APPENDECTOMY     • BREAST FIBROADENOMA SURGERY     • CARDIAC CATHETERIZATION N/A 1/17/2020    Procedure: Left Heart Cath;  Surgeon: Kingsley Zimmerman MD;  Location:  [facility];  Service: Cardiovascular   • CARDIAC CATHETERIZATION N/A 1/17/2020    Procedure: Coronary angiography;  Surgeon: Kingsley Zimmerman MD;  Location:  [facility];  Service: Cardiovascular   • CARDIAC CATHETERIZATION N/A 1/17/2020    Procedure: Left ventriculography;  Surgeon: Kingsley Zimmerman MD;  Location:  [facility];  Service: Cardiovascular   • CARDIAC CATHETERIZATION N/A 1/17/2020    Procedure: Percutaneous Coronary Intervention;  Surgeon: Kingsley Zimmerman MD;  Location:  [facility];  Service: Cardiovascular   • CARDIAC CATHETERIZATION N/A 1/17/2020    Procedure: Stent GERA coronary;  Surgeon: Kingsley Zimmerman MD;  Location:  [facility];  Service: Cardiovascular   • HYSTERECTOMY     • MASTOID SURGERY     • TONSILLECTOMY         Current Outpatient Medications on File Prior to Encounter   Medication Sig Dispense Refill   • amLODIPine (NORVASC) 5 MG tablet Take 1 tablet by mouth Daily. 90 tablet 3   • aspirin (aspirin) 81 MG EC tablet Take 1 tablet by mouth Daily. 90 tablet 3   • atorvastatin (LIPITOR) 40 MG tablet TAKE 1 TABLET BY MOUTH DAILY 90 tablet 3   • calcium carbonate (OS-JERRELL) 1250 (500 Ca) MG tablet Take 1 tablet by mouth.     • Cholecalciferol 2000 units tablet Take 4,000 Int'l Units by mouth.     • metoprolol succinate XL (TOPROL-XL) 50 MG 24 hr tablet Take 1 tablet by mouth Daily. 90 tablet 3   •  nitroglycerin (NITROSTAT) 0.4 MG SL tablet Place 1 tablet under the tongue Every 5 (Five) Minutes As Needed for Chest Pain. Place one tablet under tongue as needed for chest pain. 25 tablet 3   • pantoprazole (PROTONIX) 40 MG EC tablet TAKE 1 TABLET BY MOUTH EVERY MORNING 90 tablet 3   • topiramate (TOPAMAX) 25 MG tablet TK 1 T PO BID  3     Current Facility-Administered Medications on File Prior to Encounter   Medication Dose Route Frequency Provider Last Rate Last Admin   • [COMPLETED] technetium tetrofosmin (Tc-MYOVIEW) injection 1 dose  1 dose Intravenous Once in imaging Dixie Kemp, WALTER   1 dose at 22 1103   • [COMPLETED] technetium tetrofosmin (Tc-MYOVIEW) injection 1 dose  1 dose Intravenous Once in imaging Dixie Kemp, WALTER   1 dose at 22 1150     Allergies as of 2022 - Reviewed 2022   Allergen Reaction Noted   • Codeine  2012   • Erythromycin Diarrhea 2012   • Iodine Hives 2014   • Lisinopril Other (See Comments) 2012   • Morphine Itching and Delirium 2020   • Moxifloxacin  2017   • Sulfa antibiotics  2012   • Pneumococcal vaccines Swelling and Rash 2020     Social History     Socioeconomic History   • Marital status:      Spouse name: Tyrone Vines   • Number of children: 1   • Years of education: 14   Tobacco Use   • Smoking status: Former Smoker     Packs/day: 1.50     Years: 30.00     Pack years: 45.00     Start date:      Quit date:      Years since quittin.2   • Smokeless tobacco: Never Used   • Tobacco comment: caffeine use: 1 CUP COFFEE DAILY    Substance and Sexual Activity   • Alcohol use: No   • Drug use: No   • Sexual activity: Defer     Family History   Problem Relation Age of Onset   • Heart failure Father    • Hypertension Father    • Heart disease Father    • Breast cancer Mother    • Aneurysm Daughter        Review of Systems   Cardiovascular: Positive for chest pain and dyspnea on  exertion.   Respiratory: Positive for shortness of breath.    All other systems reviewed and are negative.     Objective:     Vitals:    04/04/22 1325 04/04/22 1352 04/04/22 1430   BP: (!) 189/95 177/79 (!) 194/87   BP Location: Left arm Left arm Left arm   Patient Position: Sitting Sitting Sitting   Pulse: 98 79 69   Resp: 20     SpO2: 98%       There is no height or weight on file to calculate BMI.    Constitutional:       Appearance: Healthy appearance. Well-developed.   Eyes:      Conjunctiva/sclera: Conjunctivae normal.   HENT:      Head: Normocephalic and atraumatic.   Pulmonary:      Effort: Pulmonary effort is normal.      Breath sounds: Normal breath sounds.   Cardiovascular:      Normal rate. Regular rhythm.      Murmurs: There is no murmur.      No gallop.   Edema:     Peripheral edema absent.   Abdominal:      Palpations: Abdomen is soft.      Tenderness: There is no abdominal tenderness.   Musculoskeletal:      Cervical back: Neck supple. Skin:     General: Skin is warm.   Neurological:      Mental Status: Alert and oriented to person, place, and time.   Psychiatric:         Behavior: Behavior normal.       Lab Review:   Procedures          Assessment:       Diagnosis Plan   1. Coronary artery disease involving native coronary artery of native heart with angina pectoris (HCC)     2. Precordial pain  Cardiac Monitoring    Vital Signs - Once    Vital Signs - As Needed    Pulse Oximetry    Oxygen Therapy- Nasal Cannula; Titrate for SPO2: 92%, equal to or greater than    Insert Peripheral IV    sodium chloride 0.9 % flush 10 mL    nitroglycerin (NITROSTAT) SL tablet 0.4 mg    NPO Diet    Bathroom Privileges With Assistance    CBC & Differential    Comprehensive Metabolic Panel    Troponin T    D-Dimer    proBNP    Cardiac Monitoring    Vital Signs - Once    Insert Peripheral IV    NPO Diet    Bathroom Privileges With Assistance    Troponin T    Troponin T    D-Dimer    D-Dimer    proBNP    proBNP     metoprolol tartrate (LOPRESSOR) injection 5 mg   3. Shortness of breath  Cardiac Monitoring    Vital Signs - Once    Vital Signs - As Needed    Pulse Oximetry    Oxygen Therapy- Nasal Cannula; Titrate for SPO2: 92%, equal to or greater than    Insert Peripheral IV    sodium chloride 0.9 % flush 10 mL    nitroglycerin (NITROSTAT) SL tablet 0.4 mg    NPO Diet    Bathroom Privileges With Assistance    CBC & Differential    Comprehensive Metabolic Panel    Troponin T    D-Dimer    proBNP    Cardiac Monitoring    Vital Signs - Once    Insert Peripheral IV    NPO Diet    Bathroom Privileges With Assistance    Troponin T    Troponin T    D-Dimer    D-Dimer    proBNP    proBNP     Plan:       Although the scintigraphic images were normal on the nuclear stress test today, she had 9 out of 10 chest pressure with exertion.  She also had ischemic EKG changes, and has a history of 2 stents to the proximal to mid LAD in January 2020.  Her symptoms have been worsening over the last several weeks and are very suspicious for angina.  Her blood pressure was elevated, although she did not take her blood pressure medications.  She received 2 doses of IV metoprolol in the [facility].    I have recommended proceeding with a diagnostic left heart catheterization at this point based on the above.  I discussed this in detail with her, and she does wish to proceed.  She does have an IVP dye allergy.  If the catheterization is done today, I will discuss this with the performing physician for premedications prior.

## 2022-04-04 NOTE — DISCHARGE INSTRUCTIONS
[facility]  [address] [place] [address]    Coronary Angiogram (Radial/Ulnar Approach) After Care    Refer to this sheet in the next few weeks. These instructions provide you with information on caring for yourself after your procedure. Your caregiver may also give you more specific instructions. Your treatment has been planned according to current medical practices, but problems sometimes occur. Call your caregiver if you have any problems or questions after your procedure.    Home Care Instructions:  You may shower the day after the procedure. Remove the bandage (dressing) and gently wash the site with plain soap and water. Gently pat the site dry. You may apply a band aid daily for 2 days if desired.    Do not apply powder or lotion to the site.  Do not submerge the affected site in water for 3 to 5 days or until the site is completely healed.   Do not lift, push or pull anything over 5 pounds for 5 days after your procedure or as directed by your physician.  As a reference, a gallon of milk weighs 8 pounds.   Inspect the site at least twice daily. You may notice some bruising at the site and it may be tender for 1 to 2 weeks.     Increase your fluid intake for the next 2 days.    Keep arm elevated for 24 hours. For the remainder of the day, keep your arm in “Pledge of Allegiance” position when up and about.     You may drive 24 hours after the procedure unless otherwise instructed by your caregiver.  Do not operate machinery or power tools for 24 hours.  A responsible adult should be with you for the first 24 hours after you arrive home. Do not make any important legal decisions or sign legal papers for 24 hours.  Do not drink alcohol for 24 hours.    Metformin or any medications containing Metformin should not be taken for 48 hours after your procedure.      Call Your Doctor if:   You have unusual pain at the radial/ulnar (wrist) site.  You have redness, warmth, swelling, or pain at the  radial/ulnar (wrist) site.  You have drainage (other than a small amount of blood on the dressing).  `You have chills or a fever > 101.  Your arm becomes pale or dark, cool, tingly, or numb.  You develop chest pain, shortness of breath, feel faint or pass out.    You have heavy bleeding from the site, hold pressure on the site for 20 minutes.  If the bleeding stops, apply a fresh bandage and call your cardiologist.  However, if you        continue to have bleeding, call 911 and continue to apply pressure to the site.   You have any symptoms of a stroke.  Remember BE FAST  B-balance. Sudden trouble walking or loss of balance.  E-eyes.  Sudden changes in how you see or a sudden onset of a very bad headache.   F-face. Sudden weakness or loss of feeling of the face or facial droop on one side.   A-arms Sudden weakness or numbness in one arm.  One arm drifts down if they are both held out in front of you. This happens suddenly and usually on one side of the body.   S-speech.  Sudden trouble speaking, slurred speech or trouble understanding what are saying.   T-time  Time to call emergency services.  Write down the symptoms and the time they started.

## 2022-04-04 NOTE — NURSING NOTE
DR DAWN IN TO SEE PT - NOTIFIED OF IV DYE ALLERGY AND NEED FOR PRE-TREATMENT -  GAVE ORDER OR SOLUMEDROL 125MG IV AND VALIUM 5MG IV FOR ANXIETY.

## 2022-04-12 ENCOUNTER — OFFICE VISIT (OUTPATIENT)
Dept: CARDIOLOGY | Facility: CLINIC | Age: 77
End: 2022-04-12

## 2022-04-12 VITALS
HEIGHT: 63 IN | DIASTOLIC BLOOD PRESSURE: 70 MMHG | SYSTOLIC BLOOD PRESSURE: 122 MMHG | BODY MASS INDEX: 20.55 KG/M2 | HEART RATE: 54 BPM | WEIGHT: 116 LBS

## 2022-04-12 DIAGNOSIS — E78.2 MIXED HYPERLIPIDEMIA: ICD-10-CM

## 2022-04-12 DIAGNOSIS — Z95.820 S/P ANGIOPLASTY WITH STENT: ICD-10-CM

## 2022-04-12 DIAGNOSIS — I49.3 PVC'S (PREMATURE VENTRICULAR CONTRACTIONS): ICD-10-CM

## 2022-04-12 DIAGNOSIS — I25.10 CORONARY ARTERY DISEASE INVOLVING NATIVE CORONARY ARTERY OF NATIVE HEART WITHOUT ANGINA PECTORIS: Primary | ICD-10-CM

## 2022-04-12 DIAGNOSIS — I10 ESSENTIAL HYPERTENSION: ICD-10-CM

## 2022-04-12 PROCEDURE — 93000 ELECTROCARDIOGRAM COMPLETE: CPT | Performed by: NURSE PRACTITIONER

## 2022-04-12 PROCEDURE — 99214 OFFICE O/P EST MOD 30 MIN: CPT | Performed by: NURSE PRACTITIONER

## 2022-04-12 NOTE — PROGRESS NOTES
[facility] Follow Up Office Note     Encounter Date:22  Patient:Della Vines  :1945  MRN:[id number]      Chief Complaint:   Chief Complaint   Patient presents with   • Follow-up     1 week   • Coronary Artery Disease   • S/P Heart Cath         History of Presenting Illness:        Della Vines is a 76 y.o. female who is here for follow-up of coronary artery disease.  She is a patient of Dr. Zimmerman.     Past medical history is significant for coronary artery disease status post PCI of LAD 2020 after abnormal stress test, ischemic cardiomyopathy with LVEF dropped to 22% now normalized, heart failure preserved ejection fraction, hypertension.    She was seen in the office 3/23/2022 and noted exertional substernal chest pain.  She was scheduled for stress MPI which was done 2022.  During MPI patient had hypertensive response to stress with ST segment depression in inferior lateral leads and chest pain.  She was sent to [facility] and further evaluated.  Decision made to proceed with urgent left heart catheterization.    Left heart catheterization demonstrated patent previous LAD stent, mild to moderate nonobstructive coronary disease with small second diagonal branch 90% ostial lesion that is covered by stent and not amenable to PCI.  It was felt that this was unlikely to be the cause of her symptoms.  She was noted to have high burden of ectopy during LV gram that caused her chest pain.  As a result an event monitor was ordered.    Today the patient reports she is still having chest pressure, it comes on when she is moving around the house cleaning, which is typically the most activity she is doing.  She has been following her blood pressure since her heart catheterization a couple of weeks ago and her systolic blood pressure is typically 120.  She checks in the morning after her medications.  She denies palpitations, shortness of breath, orthopnea or swelling.  She did have what she  considers hot flash with one of her more recent episodes of chest pressure.  She is not worn an event monitor since her left heart cath.      Review of Systems:  Review of Systems   Cardiovascular: Positive for chest pain. Negative for dyspnea on exertion, leg swelling, orthopnea and palpitations.   Respiratory: Negative for shortness of breath.        Current Outpatient Medications on File Prior to Visit   Medication Sig Dispense Refill   • amLODIPine (NORVASC) 5 MG tablet Take 1 tablet by mouth Daily. 90 tablet 3   • aspirin (aspirin) 81 MG EC tablet Take 1 tablet by mouth Daily. 90 tablet 3   • atorvastatin (LIPITOR) 40 MG tablet TAKE 1 TABLET BY MOUTH DAILY 90 tablet 3   • calcium carbonate (OS-JERRELL) 1250 (500 Ca) MG tablet Take 1 tablet by mouth.     • Cholecalciferol 2000 units tablet Take 4,000 Int'l Units by mouth.     • metoprolol succinate XL (TOPROL-XL) 50 MG 24 hr tablet Take 1 tablet by mouth Daily. 90 tablet 3   • nitroglycerin (NITROSTAT) 0.4 MG SL tablet Place 1 tablet under the tongue Every 5 (Five) Minutes As Needed for Chest Pain. Place one tablet under tongue as needed for chest pain. 25 tablet 3   • pantoprazole (PROTONIX) 40 MG EC tablet TAKE 1 TABLET BY MOUTH EVERY MORNING 90 tablet 3   • topiramate (TOPAMAX) 25 MG tablet TK 1 T PO BID  3     Current Facility-Administered Medications on File Prior to Visit   Medication Dose Route Frequency Provider Last Rate Last Admin   • nitroglycerin (NITROSTAT) SL tablet 0.4 mg  0.4 mg Sublingual Q5 Min PRN Dheeraj Huber MD       • sodium chloride 0.9 % flush 10 mL  10 mL Intravenous PRN Dheeraj Huber MD           Allergies   Allergen Reactions   • Codeine    • Erythromycin Diarrhea     Side effect    • Iodine Hives   • Lisinopril Other (See Comments)     COUGH   • Morphine Itching and Delirium   • Moxifloxacin      Elevated liver enzymes   • Sulfa Antibiotics    • Pneumococcal Vaccines Swelling and Rash       Past Medical History:   Diagnosis  Date   • Abnormal stress test 01/13/2020    Added automatically from request for surgery [id number]   • Acid reflux disease    • Bell's palsy    • Bronchiectasis without complication ([facility])     Dr Campbell   • Cerebellar infarction ([facility])    • Chest pain    • CVA (cerebral vascular accident) ([facility]) 05/19/2014    Overview:  Per old records in distant past in internal capsule   • Hyperlipidemia    • Hypertension    • Lung disease    • Osteoporosis    • PAF (paroxysmal atrial fibrillation) ([facility])    • Stroke ([facility])    • TIA (transient ischemic attack)    • Urinary tract infection        Past Surgical History:   Procedure Laterality Date   • ADENOIDECTOMY     • APPENDECTOMY     • BREAST FIBROADENOMA SURGERY     • CARDIAC CATHETERIZATION N/A 1/17/2020    Procedure: Left Heart Cath;  Surgeon: Kingsley Zimmerman MD;  Location:  [facility] CATH INVASIVE LOCATION;  Service: Cardiovascular   • CARDIAC CATHETERIZATION N/A 1/17/2020    Procedure: Coronary angiography;  Surgeon: Kingsley Zimmerman MD;  Location:  [facility]ATH [facility]VAS[facility] LOCATION;  Service: Cardiovascular   • CARDIAC CATHETERIZATION N/A 1/17/2020    Procedure: Left ventriculography;  Surgeon: Kingsley Zimmerman MD;  [facility];  Service: Cardiovascular   • CARDIAC CATHETERIZATION N/A 1/17/2020    Procedure: Percutaneous Coronary Intervention;  Surgeon: Kingsley Zimmerman MD;  [facility];  Service: Cardiovascular   • CARDIAC CATHETERIZATION N/A 1/17/2020    Procedure: Stent GERA coronary;  Surgeon: Kingsley Zimmerman MD;  [facility];  Service: Cardiovascular   • CARDIAC CATHETERIZATION N/A 4/4/2022    Procedure: Coronary angiography;  Surgeon: Kvng Burgos MD;  [facility];  Service: Cardiovascular;  Laterality: N/A;   • CARDIAC CATHETERIZATION N/A 4/4/2022    Procedure: Left heart cath;  Surgeon: Kvng Burgos MD;  [facility];   "Service: Cardiovascular;  Laterality: N/A;   • CARDIAC CATHETERIZATION N/A 2022    Procedure: Left ventriculography;  Surgeon: Kvng Burgos MD;  Location: [facility] INVASIVE LOCATION;  Service: Cardiovascular;  Laterality: N/A;   • HYSTERECTOMY     • MASTOID SURGERY     • TONSILLECTOMY         Social History     Socioeconomic History   • Marital status:      Spouse name: Tyrone Vines   • Number of children: 1   • Years of education: 14   Tobacco Use   • Smoking status: Former Smoker     Packs/day: 1.50     Years: 30.00     Pack years: 45.00     Start date:      Quit date:      Years since quittin.2   • Smokeless tobacco: Never Used   • Tobacco comment: caffeine use: 1 CUP COFFEE DAILY    Substance and Sexual Activity   • Alcohol use: No   • Drug use: No   • Sexual activity: Defer       Family History   Problem Relation Age of Onset   • Heart failure Father    • Hypertension Father    • Heart disease Father    • Breast cancer Mother    • Aneurysm Daughter        The following portions of the patient's history were reviewed and updated as appropriate: allergies, current medications, past family history, past medical history, past social history, past surgical history and problem list.       Objective:       Vitals:    22 1111   BP: 122/70   Pulse: 54   Weight: 52.6 kg (116 lb)   Height: 160 cm (63\")         Physical Exam:  Constitutional: Well appearing, well developed, no acute distress   HENT: Oropharynx clear and membrane moist  Eyes: Normal conjunctiva, no sclera icterus  Neck: Supple  Cardiovascular: Regular rate and rhythm, No Murmur, No bilateral lower extremity edema  Pulmonary: Normal respiratory effort, normal lung sounds, no wheezing  Neurological: Alert and orient x 3  Skin: Warm, dry, no ecchymosis, no rash  Psych: Appropriate mood and affect. Normal judgment and insight         Lab Results   Component Value Date     2022     2022    K 3.6 " 04/04/2022    K 4.7 03/04/2022     04/04/2022     03/04/2022    CO2 24.5 04/04/2022    CO2 22 03/04/2022    BUN 11 04/04/2022    BUN 20 03/04/2022    CREATININE 0.75 04/04/2022    CREATININE 0.92 03/04/2022    EGFRIFNONA 100 01/18/2020    EGFRIFNONA 76 01/13/2020    GLUCOSE 155 (H) 04/04/2022    GLUCOSE 87 01/18/2020    CALCIUM 9.7 04/04/2022    CALCIUM 10.2 03/04/2022    ALBUMIN 5.00 04/04/2022    ALBUMIN 4.8 (H) 03/04/2022    BILITOT 0.5 04/04/2022    BILITOT 0.2 03/04/2022    AST 21 04/04/2022    AST 22 03/04/2022    ALT 21 04/04/2022    ALT 17 03/04/2022     Lab Results   Component Value Date    WBC 7.45 04/04/2022    WBC 7.03 03/04/2022    HGB 14.5 04/04/2022    HGB 13.9 03/04/2022    HCT 42.4 04/04/2022    HCT 44.7 03/04/2022    MCV 86.7 04/04/2022    MCV 97.6 03/04/2022     04/04/2022     03/04/2022     Lab Results   Component Value Date    TRIG 94 08/25/2020    TRIG 112 05/20/2020    HDL 44 (L) 08/25/2020    HDL 46 (L) 05/20/2020    LDL 62 08/25/2020    LDL 57 05/20/2020     Lab Results   Component Value Date    PROBNP 254.0 04/04/2022     Lab Results   Component Value Date    TROPONINI <0.010 05/10/2021    TROPONINT <0.010 04/04/2022     No results found for: TSH        ECG 12 Lead    Date/Time: 4/12/2022 11:46 AM  Performed by: Kori Rodriguez APRN  Authorized by: Kori Rodriguez APRN   Comparison: compared with previous ECG from 3/23/2022  Similar to previous ECG  Rhythm: sinus rhythm  Rate: normal  Conduction: conduction normal  QRS axis: normal          Cardiac catheterization 4/4/2022:  Left main: Normal  Left anterior descending: Normal proximally there is a long stent in the proximal and mid LAD that is widely patent and really looks fantastic 20% disease in the mid distal LAD and then normal distally the diagonals first diagonal is free of disease a second diagonal is small with a 90% ostial lesion it is covered by the stent not amenable to PCI does not look like it  is changed much  Ramus intermedius:Not present  Circumflex: 20 to 30% proximal disease the remainder of the vessels normal  RCA: Is a dominant vessel.  20 to 30% proximal disease 20% mid disease normal distally     SUMMARY: Normal to hyperdynamic LV systolic function mild to moderate nonobstructive coronary disease 1 small branch with a bad lesion in it the second diagonal but not amenable to PCI and I doubt it is causing all of her symptoms interestingly when we did her LV gram she had some ectopy and she felt like that was causing chest pain for her and those were the symptoms she was having so because of that we are going to check a event recorder on her we will continue her on her same medications    Stress test with myocardial perfusion 4/4/2022:  · Patient complained of 9/10 chest pain with exertion  · Blood pressure demonstrated a hypertensive response to stress.  · A horizontal ST segment depression of 1 mm in the inferolateral leads was noted during stress (II, III, aVF, V5, V6 and V4), beginning at 4 minutes of stress, and returning to baseline after more than 9 minutes of recovery.  · Myocardial perfusion imaging indicates a normal myocardial perfusion study with no evidence of ischemia.  · Left ventricular ejection fraction is hyperdynamic (Calculated EF > 70%). .  · Because of severe chest pain, patient being evaluated further in [facility].    Echocardiogram 10/6/2021:  · Calculated left ventricular EF = 68% Estimated left ventricular EF was in agreement with the calculated left ventricular EF. Left ventricular systolic function is normal.  · Left ventricular wall thickness is consistent with mild concentric hypertrophy  · Left ventricular diastolic function is consistent with (grade I) impaired relaxation.  · Normal right ventricular cavity size and systolic function noted.  · The left atrial cavity is mildly dilated.  · Mild aortic valve regurgitation is present.  · Mild tricuspid valve regurgitation is  present.  · Calculated right ventricular systolic pressure from tricuspid regurgitation is 28 mmHg.  · There is a trivial pericardial effusion.           Assessment:          Diagnosis Plan   1. Coronary artery disease involving native coronary artery of native heart without angina pectoris  ECG 12 Lead   2. S/P angioplasty with stent     3. Essential hypertension     4. Mixed hyperlipidemia     5. PVC's (premature ventricular contractions)  Holter Monitor - 48 Hour          Plan:       Coronary artery disease status post GERA to LAD in 2020:  · Patient had recent stress testing during which she became hypertensive and had chest pain with ST depression noted on EKG.  She went for emergent left heart cath which demonstrated previous LAD stent patent and otherwise mild to moderate nonobstructive coronary disease.  She does have a 90% second diagonal which is very small and unlikely to be the cause of her symptoms.  It was noted during LV gram at the end of the case that she had chest pain that was associated with ectopy.  Event monitor was ordered but never done  · She continues to have chest pressure and it occurs when she is more active around her house  · I have ordered 48-hour Holter monitor as most days she is symptomatic to evaluate for arrhythmias  · Continue aspirin, metoprolol succinate 50 mg and statin    Essential hypertension:  · BP is controlled today in the office and when she checks at home.  She was hypertensive in response to stress testing  · Continue current medication regimen    Mixed hyperlipidemia:  · Reviewed most recent lipid panel  · Continue statin at current dose    PVCs:  · Ectopy noted during left heart cath.  Patient denies palpitations or skipped beats but at time of heart cath had associated chest pressure  · 48-hour Holter monitor ordered    Ms. Vines is still experiencing chest pressure, especially brought on by activity.  It was noted at the end of her left heart catheterization, which  did not show obstructive coronary disease other than small diagonal branch that could not be intervened on, that her chest pressure was associated with ectopy.  An event monitor was ordered at that time that has not been done for unclear reasons.  Today I have ordered a 48-hour Holter monitor.  She states that she experiences the symptoms daily.  I asked her to keep a record of whether she experiences symptoms while she is wearing the monitor.  I will call her with the results of this testing and if we need to make any changes.  Otherwise she will keep previously scheduled appointment with Dr. Zimmerman 5/5/2022.      Orders Placed This Encounter   Procedures   • Holter Monitor - 48 Hour     Standing Status:   Future     Number of Occurrences:   1     Standing Expiration Date:   4/12/2023     Order Specific Question:   Reason for exam?     Answer:   Other     Order Specific Question:   Other reason?     Answer:   chest pressure associated with ectopy     Order Specific Question:   Release to patient     Answer:   Immediate   • ECG 12 Lead     This order was created via procedure documentation     Order Specific Question:   Release to patient     Answer:   Immediate            WALTER Topete  [facility]  04/12/22  11:57 EDT

## 2022-05-12 ENCOUNTER — OFFICE VISIT (OUTPATIENT)
Dept: CARDIOLOGY | Facility: CLINIC | Age: 77
End: 2022-05-12

## 2022-05-12 VITALS
DIASTOLIC BLOOD PRESSURE: 70 MMHG | HEIGHT: 63 IN | SYSTOLIC BLOOD PRESSURE: 128 MMHG | WEIGHT: 115 LBS | BODY MASS INDEX: 20.38 KG/M2 | HEART RATE: 61 BPM

## 2022-05-12 DIAGNOSIS — E78.2 MIXED HYPERLIPIDEMIA: ICD-10-CM

## 2022-05-12 DIAGNOSIS — I25.10 CORONARY ARTERY DISEASE INVOLVING NATIVE CORONARY ARTERY OF NATIVE HEART WITHOUT ANGINA PECTORIS: Primary | ICD-10-CM

## 2022-05-12 DIAGNOSIS — I10 ESSENTIAL HYPERTENSION: ICD-10-CM

## 2022-05-12 DIAGNOSIS — Z86.73 HISTORY OF CVA (CEREBROVASCULAR ACCIDENT): ICD-10-CM

## 2022-05-12 PROCEDURE — 99214 OFFICE O/P EST MOD 30 MIN: CPT | Performed by: INTERNAL MEDICINE

## 2022-05-12 PROCEDURE — 93000 ELECTROCARDIOGRAM COMPLETE: CPT | Performed by: INTERNAL MEDICINE

## 2022-05-12 NOTE — PROGRESS NOTES
[facility] Follow Up Office Note     Encounter Date:22  Patient:Della Vines  :1945  MRN:[id number]      Chief Complaint:   Chief Complaint   Patient presents with   • 1 month BHE f/u     History of Presenting Illness:      Ms. Vines is a 76 y.o. woman with past medical history notable for coronary artery disease status post percutaneous intervention, TIAs, hypertension, mixed hyperlipidemia, and chronic lung disease who presents to my office for follow-up.  She was last seen in our office back in April and was asked to do a stress test that she was wanting to try to get back and exercise and was having some concerns about chest pains.  She was also in her a lot of stress then.  She did not do well on her stress test was actually sent directly over to the Cath Lab for urgent cardiac catheterization where she was found to have normal coronary arteries and a normal LV.  Do follow-up with a Holter monitor to make sure she was having palpitations or PVCs but this was also normal.  In general she is doing excellent now not having any issues and overall is feeling great       Review of Systems:  Review of Systems   Constitutional: Negative.   HENT: Negative.    Eyes: Negative.    Cardiovascular: Positive for dyspnea on exertion.   Respiratory: Positive for shortness of breath.    Endocrine: Negative.    Hematologic/Lymphatic: Negative.    Skin: Negative.    Musculoskeletal: Positive for muscle cramps.   Gastrointestinal: Negative.    Genitourinary: Negative.    Neurological: Negative.    Psychiatric/Behavioral: Negative.    Allergic/Immunologic: Negative.        Current Outpatient Medications on File Prior to Visit   Medication Sig Dispense Refill   • amLODIPine (NORVASC) 5 MG tablet Take 1 tablet by mouth Daily. 90 tablet 3   • aspirin (aspirin) 81 MG EC tablet Take 1 tablet by mouth Daily. 90 tablet 3   • atorvastatin (LIPITOR) 40 MG tablet TAKE 1 TABLET BY MOUTH DAILY 90 tablet 3   • calcium  carbonate (OS-JERRELL) 1250 (500 Ca) MG tablet Take 1 tablet by mouth.     • Cholecalciferol 2000 units tablet Take 4,000 Int'l Units by mouth.     • metoprolol succinate XL (TOPROL-XL) 50 MG 24 hr tablet Take 1 tablet by mouth Daily. 90 tablet 3   • Mirabegron ER (Myrbetriq) 50 MG tablet sustained-release 24 hour 24 hr tablet Take 50 mg by mouth Daily.     • nitroglycerin (NITROSTAT) 0.4 MG SL tablet Place 1 tablet under the tongue Every 5 (Five) Minutes As Needed for Chest Pain. Place one tablet under tongue as needed for chest pain. 25 tablet 3   • pantoprazole (PROTONIX) 40 MG EC tablet TAKE 1 TABLET BY MOUTH EVERY MORNING 90 tablet 3   • topiramate (TOPAMAX) 25 MG tablet TK 1 T PO BID  3     Current Facility-Administered Medications on File Prior to Visit   Medication Dose Route Frequency Provider Last Rate Last Admin   • nitroglycerin (NITROSTAT) SL tablet 0.4 mg  0.4 mg Sublingual Q5 Min PRN Dheeraj Huber MD       • sodium chloride 0.9 % flush 10 mL  10 mL Intravenous PRN Dheeraj Huber MD           Allergies   Allergen Reactions   • Codeine    • Erythromycin Diarrhea     Side effect    • Iodine Hives   • Lisinopril Other (See Comments)     COUGH   • Morphine Itching and Delirium   • Moxifloxacin      Elevated liver enzymes   • Sulfa Antibiotics    • Pneumococcal Vaccines Swelling and Rash       Past Medical History:   Diagnosis Date   • Abnormal stress test 01/13/2020    Added automatically from request for surgery [id number]   • Acid reflux disease    • Bell's palsy    • Bronchiectasis without complication ([facility])     Dr Campbell   • Cerebellar infarction ([facility])    • Chest pain    • CVA (cerebral vascular accident) ([facility]) 05/19/2014    Overview:  Per old records in distant past in internal capsule   • Hyperlipidemia    • Hypertension    • Lung disease    • Osteoporosis    • PAF (paroxysmal atrial fibrillation) ([facility])    • Stroke ([facility])    • TIA (transient ischemic attack)    • Urinary tract infection         Past Surgical History:   Procedure Laterality Date   • ADENOIDECTOMY     • APPENDECTOMY     • BREAST FIBROADENOMA SURGERY     • CARDIAC CATHETERIZATION N/A 1/17/2020    Procedure: Left Heart Cath;  Surgeon: Kingsley Zimmerman MD;  Location:  [facility] CATH [facility]VASIVE [facility];  Service: Cardiovascular   • CARDIAC CATHETERIZATION N/A 1/17/2020    Procedure: Coronary angiography;  Surgeon: Kingsley Zimmerman MD;  Location:  [facility];  Service: Cardiovascular   • CARDIAC CATHETERIZATION N/A 1/17/2020    Procedure: Left ventriculography;  Surgeon: Kingsley Zimmerman MD;  Location:  [facility];  Service: Cardiovascular   • CARDIAC CATHETERIZATION N/A 1/17/2020    Procedure: Percutaneous Coronary Intervention;  Surgeon: Kingsley Zimmerman MD;  Location:  [facility];  Service: Cardiovascular   • CARDIAC CATHETERIZATION N/A 1/17/2020    Procedure: Stent GERA coronary;  Surgeon: Kingsley Zimmerman MD;  Location:  [facility];  Service: Cardiovascular   • CARDIAC CATHETERIZATION N/A 4/4/2022    Procedure: Coronary angiography;  Surgeon: Kvng Burgos MD;  Location:  [facility];  Service: Cardiovascular;  Laterality: N/A;   • CARDIAC CATHETERIZATION N/A 4/4/2022    Procedure: Left heart cath;  Surgeon: Kvng Burgos MD;  Location:  [facility];  Service: Cardiovascular;  Laterality: N/A;   • CARDIAC CATHETERIZATION N/A 4/4/2022    Procedure: Left ventriculography;  Surgeon: Kvng Burgos MD;  [facility];  Service: Cardiovascular;  Laterality: N/A;   • HYSTERECTOMY     • MASTOID SURGERY     • TONSILLECTOMY         Social History     Socioeconomic History   • Marital status:      Spouse name: Tyrone Vines   • Number of children: 1   • Years of education: 14   Tobacco Use   • Smoking status: Former Smoker     Packs/day: 1.50     Years: 30.00     Pack years: 45.00     Start date:  "     Quit date:      Years since quittin.3   • Smokeless tobacco: Never Used   • Tobacco comment: caffeine use: 1 CUP COFFEE DAILY    Substance and Sexual Activity   • Alcohol use: No   • Drug use: No   • Sexual activity: Defer       Family History   Problem Relation Age of Onset   • Heart failure Father    • Hypertension Father    • Heart disease Father    • Breast cancer Mother    • Aneurysm Daughter        The following portions of the patient's history were reviewed and updated as appropriate: allergies, current medications, past family history, past medical history, past social history, past surgical history and problem list.       Objective:       Vitals:    22 1226   BP: 128/70   BP Location: Left arm   Patient Position: Sitting   Pulse: 61   Weight: 52.2 kg (115 lb)   Height: 160 cm (63\")     Body mass index is 20.37 kg/m².     Physical Exam:  Constitutional: Well appearing, well developed, no acute distress   HENT: Oropharynx clear and membrane moist  Eyes: Normal conjunctiva, no sclera icterus.  Neck: Supple, no carotid bruit bilaterally.  Cardiovascular: Regular rate and regular rhythm, no murmur, no lower extremity edema.  Pulmonary: Normal respiratory effort, no lung sounds, no wheezing.  Abdominal: Soft, nontender, no hepatosplenomegaly, liver is non-pulsatile.  Neurological: Alert and orient x 3.   Skin: Warm, dry, no ecchymosis, no rash.  Psych: Appropriate mood and affect. Normal judgment and insight.       Lab Results   Component Value Date    GLUCOSE 155 (H) 2022    BUN 11 2022    CREATININE 0.75 2022    EGFRIFNONA 100 2020    BCR 14.7 2022    K 3.6 2022    CO2 24.5 2022    CALCIUM 9.7 2022    ALBUMIN 5.00 2022    LABIL2 2.2 2022    AST 21 2022    ALT 21 2022       Lab Results   Component Value Date    WBC 7.45 2022    HGB 14.5 2022    HCT 42.4 2022    MCV 86.7 2022     " 2022       Lab Results   Component Value Date    CHLPL 125 2020    CHLPL 125 2020    CHLPL 120 2020     Lab Results   Component Value Date    TRIG 94 2020    TRIG 112 2020    TRIG 100 2020     Lab Results   Component Value Date    HDL 44 (L) 2020    HDL 46 (L) 2020    HDL 41 (L) 2020     Lab Results   Component Value Date    LDL 62 2020    LDL 57 2020    LDL 59 2020     CMP 9/3/2021:  Sodium 145  Potassium 4.8  Chloride 107  CO2 24  BUN 15  Creatinine 0.6  ALT 16  AST 24  Alk phos 84    Lipid Panel 9/3/2021:  Total cholesterol: 132  Tri  HDL: 42  LDL: 69      ECG 12 Lead    Date/Time: 2022 12:51 PM  Performed by: Kingsley Zimmerman MD  Authorized by: Kingsley Zimmerman MD   Comparison: compared with previous ECG from 2022  Similar to previous ECG  Rhythm: sinus rhythm        Holter 2022:  · A normal monitor study.  · Underlying heart rhythm is sinus rhythm with an average heart rate of of 57 bpm and a heart rate range of 46 bpm up to 79 bpm  · Patient reported events correlated with underlying sinus rhythm.    Cardiac Catheterization 2022:  · Left main: Normal  · Left anterior descending: Normal proximally there is a long stent in the proximal and mid LAD that is widely patent and really looks fantastic 20% disease in the mid distal LAD and then normal distally the diagonals first diagonal is free of disease a second diagonal is small with a 90% ostial lesion it is covered by the stent not amenable to PCI does not look like it is changed much  · Ramus intermedius:Not present  · Circumflex: 20 to 30% proximal disease the remainder of the vessels normal  · RCA: Is a dominant vessel.  20 to 30% proximal disease 20% mid disease normal distally    Stress MPI 2022:  · Patient complained of 9/10 chest pain with exertion  · Blood pressure demonstrated a hypertensive response to stress.  · A horizontal ST segment  "depression of 1 mm in the inferolateral leads was noted during stress (II, III, aVF, V5, V6 and V4), beginning at 4 minutes of stress, and returning to baseline after more than 9 minutes of recovery.  · Myocardial perfusion imaging indicates a normal myocardial perfusion study with no evidence of ischemia.  · Left ventricular ejection fraction is hyperdynamic (Calculated EF > 70%). .  · Because of severe chest pain, patient being evaluated further in [facility].    Cardiac Catheterization 1/17/2020:  · Severe single-vessel coronary artery disease with a long stenoses involving the proximal to mid LAD at 90%.  There is 40% ostial circumflex stenoses and scattered 20% stenoses in the RCA.  · Normal left ventricular function at 60% with a normal left ventricular filling pressure 11 mmHg.  · Successful revascularization of proximal to mid LAD with placement of overlapping 2.25 x 33 mm and 2.25 x 8 mm Xience drug-eluting stents with the mid and proximal segments postdilated with a noncompliant 2.5 mm balloon to 16 roland.    Mobile Telemetry 1/16/2020:  · A normal monitor study.  · Underlying heart rhythm was sinus with an average rate of 86 bpm no significant arrhythmias noted during her study    Stress MPI 1/7/2020:  · Patient complained of 8/10 chest pressure and \"burning sensation\" with exertion  · Myocardial perfusion imaging indicates a normal myocardial perfusion study with no evidence of ischemia.  · Left ventricular ejection fraction is severely reduced (Calculated EF = 22%).  · The gating does not appear to accurate on this study. Would recommend correlation with echocardiogram.    Carotid Duplex 1/7/2020:  · Distal right internal carotid artery mild stenosis.  · Mid left internal carotid artery mild stenosis.     Assessment:          Diagnosis Plan   1. Coronary artery disease involving native coronary artery of native heart without angina pectoris  ECG 12 Lead   2. Essential hypertension     3. Mixed hyperlipidemia   "   4. History of CVA (cerebrovascular accident)            Plan:       Ms. Vines is a 76 y.o. woman with past medical history notable for coronary artery disease status post percutaneous intervention, TIAs, hypertension, mixed hyperlipidemia, and chronic lung disease who presents to my office for follow-up.  In general she is doing great.  I would not make any changes with her medical regimen.  Plan on seeing her back in 6 months      Coronary artery disease without angina:  · Continue aspirin   · Continue statin  · Continue beta-blocker     · CBC 4/2022 demonstrates normal platelet count    Hypertension:  · Will continue amlodipine but had issues with higher doses due to leg swelling  · Continue metoprolol succinate but could change to carvedilol as an opportunity to optimize blood pressure  · Had led cramps on chlorthalidone   · Consider adding spironolactone  · BMP 4/2022 demonstrates normal sodium, potassium, and creatinine     Mixed hyperlipidemia:  · Patient currently on statin therapy  · Lipid panel 9/2021 demonstrates good LDL control  · CMP 4/2021 demonstrates normal ALT and AST     History of CVA:  · Continue aspirin and statin      Follow-up:  6 months      Kingsley Zimmerman MD  [facility]  05/12/22  12:55 EDT

## 2022-07-16 ENCOUNTER — HOSPITAL ENCOUNTER (EMERGENCY)
Facility: HOSPITAL | Age: 77
Discharge: HOME | End: 2022-07-16
Attending: EMERGENCY MEDICINE | Admitting: EMERGENCY MEDICINE

## 2022-07-16 ENCOUNTER — APPOINTMENT (OUTPATIENT)
Dept: CT IMAGING | Facility: HOSPITAL | Age: 77
End: 2022-07-16

## 2022-07-16 VITALS
HEIGHT: 63 IN | WEIGHT: 115 LBS | SYSTOLIC BLOOD PRESSURE: 165 MMHG | TEMPERATURE: 97.1 F | HEART RATE: 58 BPM | OXYGEN SATURATION: 97 % | BODY MASS INDEX: 20.38 KG/M2 | RESPIRATION RATE: 18 BRPM | DIASTOLIC BLOOD PRESSURE: 73 MMHG

## 2022-07-16 DIAGNOSIS — I10 PRIMARY HYPERTENSION: Primary | ICD-10-CM

## 2022-07-16 DIAGNOSIS — R51.9 NONINTRACTABLE HEADACHE, UNSPECIFIED CHRONICITY PATTERN, UNSPECIFIED HEADACHE TYPE: ICD-10-CM

## 2022-07-16 LAB
ALBUMIN SERPL-MCNC: 4.7 G/DL (ref 3.5–5.2)
ALBUMIN/GLOB SERPL: 1.6 G/DL
ALP SERPL-CCNC: 89 U/L (ref 39–117)
ALT SERPL W P-5'-P-CCNC: 15 U/L (ref 1–33)
ANION GAP SERPL CALCULATED.3IONS-SCNC: 10.1 MMOL/L (ref 5–15)
AST SERPL-CCNC: 17 U/L (ref 1–32)
BASOPHILS # BLD AUTO: 0.06 10*3/MM3 (ref 0–0.2)
BASOPHILS NFR BLD AUTO: 1 % (ref 0–1.5)
BILIRUB SERPL-MCNC: 0.6 MG/DL (ref 0–1.2)
BUN SERPL-MCNC: 15 MG/DL (ref 8–23)
BUN/CREAT SERPL: 19.2 (ref 7–25)
CALCIUM SPEC-SCNC: 9.7 MG/DL (ref 8.6–10.5)
CHLORIDE SERPL-SCNC: 107 MMOL/L (ref 98–107)
CO2 SERPL-SCNC: 25.9 MMOL/L (ref 22–29)
CREAT SERPL-MCNC: 0.78 MG/DL (ref 0.57–1)
CRP SERPL-MCNC: <0.3 MG/DL (ref 0–0.5)
DEPRECATED RDW RBC AUTO: 38.4 FL (ref 37–54)
EGFRCR SERPLBLD CKD-EPI 2021: 79.3 ML/MIN/1.73
EOSINOPHIL # BLD AUTO: 0.21 10*3/MM3 (ref 0–0.4)
EOSINOPHIL NFR BLD AUTO: 3.4 % (ref 0.3–6.2)
ERYTHROCYTE [DISTWIDTH] IN BLOOD BY AUTOMATED COUNT: 12 % (ref 12.3–15.4)
ERYTHROCYTE [SEDIMENTATION RATE] IN BLOOD: 7 MM/HR (ref 0–30)
GLOBULIN UR ELPH-MCNC: 2.9 GM/DL
GLUCOSE SERPL-MCNC: 93 MG/DL (ref 65–99)
HCT VFR BLD AUTO: 42.6 % (ref 34–46.6)
HGB BLD-MCNC: 14.2 G/DL (ref 12–15.9)
IMM GRANULOCYTES # BLD AUTO: 0.02 10*3/MM3 (ref 0–0.05)
IMM GRANULOCYTES NFR BLD AUTO: 0.3 % (ref 0–0.5)
LYMPHOCYTES # BLD AUTO: 2.26 10*3/MM3 (ref 0.7–3.1)
LYMPHOCYTES NFR BLD AUTO: 36.1 % (ref 19.6–45.3)
MCH RBC QN AUTO: 29.6 PG (ref 26.6–33)
MCHC RBC AUTO-ENTMCNC: 33.3 G/DL (ref 31.5–35.7)
MCV RBC AUTO: 88.8 FL (ref 79–97)
MONOCYTES # BLD AUTO: 0.41 10*3/MM3 (ref 0.1–0.9)
MONOCYTES NFR BLD AUTO: 6.5 % (ref 5–12)
NEUTROPHILS NFR BLD AUTO: 3.3 10*3/MM3 (ref 1.7–7)
NEUTROPHILS NFR BLD AUTO: 52.7 % (ref 42.7–76)
NRBC BLD AUTO-RTO: 0 /100 WBC (ref 0–0.2)
PLATELET # BLD AUTO: 279 10*3/MM3 (ref 140–450)
PMV BLD AUTO: 10 FL (ref 6–12)
POTASSIUM SERPL-SCNC: 3.6 MMOL/L (ref 3.5–5.2)
PROT SERPL-MCNC: 7.6 G/DL (ref 6–8.5)
QT INTERVAL: 440 MS
RBC # BLD AUTO: 4.8 10*6/MM3 (ref 3.77–5.28)
SODIUM SERPL-SCNC: 143 MMOL/L (ref 136–145)
TROPONIN T SERPL-MCNC: <0.01 NG/ML (ref 0–0.03)
WBC NRBC COR # BLD: 6.26 10*3/MM3 (ref 3.4–10.8)

## 2022-07-16 PROCEDURE — 84484 ASSAY OF TROPONIN QUANT: CPT | Performed by: EMERGENCY MEDICINE

## 2022-07-16 PROCEDURE — 25010000002 DIPHENHYDRAMINE PER 50 MG: Performed by: EMERGENCY MEDICINE

## 2022-07-16 PROCEDURE — 80053 COMPREHEN METABOLIC PANEL: CPT | Performed by: EMERGENCY MEDICINE

## 2022-07-16 PROCEDURE — 99284 EMERGENCY DEPT VISIT MOD MDM: CPT

## 2022-07-16 PROCEDURE — 96374 THER/PROPH/DIAG INJ IV PUSH: CPT

## 2022-07-16 PROCEDURE — 36415 COLL VENOUS BLD VENIPUNCTURE: CPT

## 2022-07-16 PROCEDURE — 70450 CT HEAD/BRAIN W/O DYE: CPT

## 2022-07-16 PROCEDURE — 93005 ELECTROCARDIOGRAM TRACING: CPT | Performed by: EMERGENCY MEDICINE

## 2022-07-16 PROCEDURE — 85025 COMPLETE CBC W/AUTO DIFF WBC: CPT | Performed by: EMERGENCY MEDICINE

## 2022-07-16 PROCEDURE — 25010000002 METOCLOPRAMIDE PER 10 MG: Performed by: EMERGENCY MEDICINE

## 2022-07-16 PROCEDURE — 25010000002 KETOROLAC TROMETHAMINE PER 15 MG: Performed by: EMERGENCY MEDICINE

## 2022-07-16 PROCEDURE — 93010 ELECTROCARDIOGRAM REPORT: CPT | Performed by: INTERNAL MEDICINE

## 2022-07-16 PROCEDURE — 85652 RBC SED RATE AUTOMATED: CPT | Performed by: EMERGENCY MEDICINE

## 2022-07-16 PROCEDURE — 96375 TX/PRO/DX INJ NEW DRUG ADDON: CPT

## 2022-07-16 PROCEDURE — 86140 C-REACTIVE PROTEIN: CPT | Performed by: EMERGENCY MEDICINE

## 2022-07-16 RX ORDER — METOCLOPRAMIDE HYDROCHLORIDE 5 MG/ML
10 INJECTION INTRAMUSCULAR; INTRAVENOUS ONCE
Status: COMPLETED | OUTPATIENT
Start: 2022-07-16 | End: 2022-07-16

## 2022-07-16 RX ORDER — HYDROCHLOROTHIAZIDE 12.5 MG/1
12.5 TABLET ORAL DAILY
Qty: 30 TABLET | Refills: 0 | Status: SHIPPED | OUTPATIENT
Start: 2022-07-16 | End: 2022-08-10 | Stop reason: ALTCHOICE

## 2022-07-16 RX ORDER — DIPHENHYDRAMINE HYDROCHLORIDE 50 MG/ML
25 INJECTION INTRAMUSCULAR; INTRAVENOUS ONCE
Status: COMPLETED | OUTPATIENT
Start: 2022-07-16 | End: 2022-07-16

## 2022-07-16 RX ORDER — SODIUM CHLORIDE 0.9 % (FLUSH) 0.9 %
10 SYRINGE (ML) INJECTION AS NEEDED
Status: DISCONTINUED | OUTPATIENT
Start: 2022-07-16 | End: 2022-07-16 | Stop reason: HOSPADM

## 2022-07-16 RX ORDER — LABETALOL HYDROCHLORIDE 5 MG/ML
10 INJECTION, SOLUTION INTRAVENOUS ONCE
Status: COMPLETED | OUTPATIENT
Start: 2022-07-16 | End: 2022-07-16

## 2022-07-16 RX ORDER — KETOROLAC TROMETHAMINE 15 MG/ML
15 INJECTION, SOLUTION INTRAMUSCULAR; INTRAVENOUS ONCE
Status: COMPLETED | OUTPATIENT
Start: 2022-07-16 | End: 2022-07-16

## 2022-07-16 RX ADMIN — KETOROLAC TROMETHAMINE 15 MG: 15 INJECTION, SOLUTION INTRAMUSCULAR; INTRAVENOUS at 06:57

## 2022-07-16 RX ADMIN — METOCLOPRAMIDE HYDROCHLORIDE 10 MG: 5 INJECTION INTRAMUSCULAR; INTRAVENOUS at 06:56

## 2022-07-16 RX ADMIN — DIPHENHYDRAMINE HYDROCHLORIDE 25 MG: 50 INJECTION, SOLUTION INTRAMUSCULAR; INTRAVENOUS at 06:54

## 2022-07-16 RX ADMIN — LABETALOL HYDROCHLORIDE 10 MG: 5 INJECTION, SOLUTION INTRAVENOUS at 07:08

## 2022-07-16 NOTE — ED PROVIDER NOTES
EMERGENCY DEPARTMENT ENCOUNTER    Room Number:  16/16  Date of encounter:  7/16/2022  PCP: Josep Kelley MD  Historian: Patient,       HPI:  Chief Complaint: Headache and hypertension  A complete HPI/ROS/PMH/PSH/SH/FH are unobtainable due to: None    Context: Della Vines is a 76 y.o. female who presents to the ED c/o headache and hypertension.  She states that symptoms began yesterday evening at 10 PM with right frontal headache.  At its worst pain was 10/10 in intensity.  It was gradual in onset.  It radiated down to the occiput of her head.  She has had no fever or vomiting.  No neck pain.  She is here because she is concerned that her blood pressures been running very high as high as 220 systolic.  Normally her blood pressure is 130 systolic.  She was recently discontinued from amlodipine and started on metoprolol due to having leg swelling issues that she attributed to amlodipine.  No chest pain or shortness of breath.      PAST MEDICAL HISTORY  Active Ambulatory Problems     Diagnosis Date Noted   • Visit for gynecologic examination 11/09/2017   • Mixed incontinence 11/09/2017   • Atrophic vaginitis 11/09/2017   • Age related osteoporosis 11/09/2017   • Bronchiectasis ([facility]) 05/19/2014   • Essential hypertension 04/28/2016   • GERD (gastroesophageal reflux disease) 04/03/2014   • Hyperlipidemia 12/30/2019   • ANTONY (mycobacterium avium-intracellulare) ([facility]) 11/22/2016   • Coronary artery disease involving native coronary artery of native heart without angina pectoris 01/17/2020   • History of CVA (cerebrovascular accident) 09/20/2021   • S/P angioplasty with stent 03/23/2022     Resolved Ambulatory Problems     Diagnosis Date Noted   • CVA (cerebral vascular accident) ([facility]) 05/19/2014   • Abnormal stress test 01/13/2020     Past Medical History:   Diagnosis Date   • Acid reflux disease    • Bell's palsy    • Bronchiectasis without complication ([facility])    • Cerebellar infarction ([facility])    • Chest pain    •  Hypertension    • Lung disease    • Osteoporosis    • PAF (paroxysmal atrial fibrillation) ([facility])    • Stroke (H[facility])    • TIA (transient ischemic attack)    • Urinary tract infection          PAST SURGICAL HISTORY  Past Surgical History:   Procedure Laterality Date   • ADENOIDECTOMY     • APPENDECTOMY     • BREAST FIBROADENOMA SURGERY     • CARDIAC CATHETERIZATION N/A 1/17/2020    Procedure: Left Heart Cath;  Surgeon: Kingsley Zimmerman MD;  Location:  [facility] CATH INVASIVE LOCATION;  Service: Cardiovascular   • CARDIAC CATHETERIZATION N/A 1/17/2020    Procedure: Coronary angiography;  Surgeon: Kingsley Zimmerman MD;  Location:  [facility] CATH INVASIVE LOCATION;  Service: Cardiovascular   • CARDIAC CATHETERIZATION N/A 1/17/2020    Procedure: Left ventriculography;  Surgeon: Kingsley Zimmerman MD;  Location:  [facility] CATH INVASIVE LOCATION;  Service: Cardiovascular   • CARDIAC CATHETERIZATION N/A 1/17/2020    Procedure: Percutaneous Coronary Intervention;  Surgeon: Kingsley Zimmerman MD;  [facility];  Service: Cardiovascular   • CARDIAC CATHETERIZATION N/A 1/17/2020    Procedure: Stent GERA coronary;  Surgeon: Kingsley Zimmerman MD;  Location[facility];  Service: Cardiovascular   • CARDIAC CATHETERIZATION N/A 4/4/2022    Procedure: Coronary angiography;  Surgeon: Kvng Burgos MD;  Location: [facility];  Service: Cardiovascular;  Laterality: N/A;   • CARDIAC CATHETERIZATION N/A 4/4/2022    Procedure: Left heart cath;  Surgeon: Kvng Burgos MD;  Location: [facility] C[facility]IVE LOCATION;  Service: Cardiovascular;  Laterality: N/A;   • CARDIAC CATHETERIZATION N/A 4/4/2022    Procedure: Left ventriculography;  Surgeon: Kvng Burgos MD;  Location: [facility] CATH INVASIVE LOCATION;  Service: Cardiovascular;  Laterality: N/A;   • HYSTERECTOMY     • MASTOID SURGERY     • TONSILLECTOMY           FAMILY HISTORY  Family History   Problem Relation Age of Onset   •  Heart failure Father    • Hypertension Father    • Heart disease Father    • Breast cancer Mother    • Aneurysm Daughter          SOCIAL HISTORY  Social History     Socioeconomic History   • Marital status:      Spouse name: Tyrone Vines   • Number of children: 1   • Years of education: 14   Tobacco Use   • Smoking status: Former Smoker     Packs/day: 1.50     Years: 30.00     Pack years: 45.00     Start date:      Quit date:      Years since quittin.5   • Smokeless tobacco: Never Used   • Tobacco comment: caffeine use: 1 CUP COFFEE DAILY    Substance and Sexual Activity   • Alcohol use: No   • Drug use: No   • Sexual activity: Defer         ALLERGIES  Codeine, Erythromycin, Iodine, Lisinopril, Morphine, Moxifloxacin, Sulfa antibiotics, and Pneumococcal vaccines        REVIEW OF SYSTEMS  Review of Systems     All systems reviewed and negative except for those discussed in HPI.       PHYSICAL EXAM    I have reviewed the triage vital signs and nursing notes.    ED Triage Vitals [22 0122]   Temp Heart Rate Resp BP SpO2   97.1 °F (36.2 °C) 54 16 (!) 250/108 96 %      Temp src Heart Rate Source Patient Position BP Location FiO2 (%)   Tympanic -- Standing Left arm --       Physical Exam  GENERAL: not distressed  HENT: nares patent, no temporal artery tenderness  EYES: no scleral icterus  CV: regular rhythm, regular rate  RESPIRATORY: normal effort, clear to auscultation bilaterally  ABDOMEN: soft, nontender  MUSCULOSKELETAL: no deformity  NEURO:   Recent and remote memory functions are normal. The patient is attentive with normal concentration. Language is fluent. Speech is clear. The speech is non-dysarthric. Fund of knowledge is normal.   Symmetric smile with no facial droop.  Eyes close shut strongly bilaterally.  Symmetric eyebrow raise bilaterally.  EOMI, PERRL  CN II-XII grossly normal otherwise.  5/5 strength to extremities.  No pronator drift.  Intact FNF.  No meningismus.  SKIN: warm,  dry        LAB RESULTS  Recent Results (from the past 24 hour(s))   ECG 12 Lead    Collection Time: 07/16/22  2:13 AM   Result Value Ref Range    QT Interval 440 ms   Comprehensive Metabolic Panel    Collection Time: 07/16/22  6:39 AM    Specimen: Blood   Result Value Ref Range    Glucose 93 65 - 99 mg/dL    BUN 15 8 - 23 mg/dL    Creatinine 0.78 0.57 - 1.00 mg/dL    Sodium 143 136 - 145 mmol/L    Potassium 3.6 3.5 - 5.2 mmol/L    Chloride 107 98 - 107 mmol/L    CO2 25.9 22.0 - 29.0 mmol/L    Calcium 9.7 8.6 - 10.5 mg/dL    Total Protein 7.6 6.0 - 8.5 g/dL    Albumin 4.70 3.50 - 5.20 g/dL    ALT (SGPT) 15 1 - 33 U/L    AST (SGOT) 17 1 - 32 U/L    Alkaline Phosphatase 89 39 - 117 U/L    Total Bilirubin 0.6 0.0 - 1.2 mg/dL    Globulin 2.9 gm/dL    A/G Ratio 1.6 g/dL    BUN/Creatinine Ratio 19.2 7.0 - 25.0    Anion Gap 10.1 5.0 - 15.0 mmol/L    eGFR 79.3 >60.0 mL/min/1.73   Troponin    Collection Time: 07/16/22  6:39 AM    Specimen: Blood   Result Value Ref Range    Troponin T <0.010 0.000 - 0.030 ng/mL   CBC Auto Differential    Collection Time: 07/16/22  6:39 AM    Specimen: Blood   Result Value Ref Range    WBC 6.26 3.40 - 10.80 10*3/mm3    RBC 4.80 3.77 - 5.28 10*6/mm3    Hemoglobin 14.2 12.0 - 15.9 g/dL    Hematocrit 42.6 34.0 - 46.6 %    MCV 88.8 79.0 - 97.0 fL    MCH 29.6 26.6 - 33.0 pg    MCHC 33.3 31.5 - 35.7 g/dL    RDW 12.0 (L) 12.3 - 15.4 %    RDW-SD 38.4 37.0 - 54.0 fl    MPV 10.0 6.0 - 12.0 fL    Platelets 279 140 - 450 10*3/mm3    Neutrophil % 52.7 42.7 - 76.0 %    Lymphocyte % 36.1 19.6 - 45.3 %    Monocyte % 6.5 5.0 - 12.0 %    Eosinophil % 3.4 0.3 - 6.2 %    Basophil % 1.0 0.0 - 1.5 %    Immature Grans % 0.3 0.0 - 0.5 %    Neutrophils, Absolute 3.30 1.70 - 7.00 10*3/mm3    Lymphocytes, Absolute 2.26 0.70 - 3.10 10*3/mm3    Monocytes, Absolute 0.41 0.10 - 0.90 10*3/mm3    Eosinophils, Absolute 0.21 0.00 - 0.40 10*3/mm3    Basophils, Absolute 0.06 0.00 - 0.20 10*3/mm3    Immature Grans, Absolute 0.02  0.00 - 0.05 10*3/mm3    nRBC 0.0 0.0 - 0.2 /100 WBC       Ordered the above labs and independently reviewed the results.        RADIOLOGY  CT Head Without Contrast    Result Date: 7/16/2022  CT HEAD WO CONTRAST-  INDICATIONS: Headache, hypertension  TECHNIQUE: Radiation dose reduction techniques were utilized, including automated exposure control and exposure modulation based on body size. Noncontrast head CT  COMPARISON: None available  FINDINGS:    No acute intracranial hemorrhage, midline shift or mass effect. No acute territorial infarct is identified.  Mild periventricular hypodensities suggest chronic small vessel ischemic change in a patient this age.  Arterial calcifications are seen at the base of the brain.  Ventricles, cisterns, cerebral sulci are unremarkable for patient age.  Left partial mastoidectomy changes are seen. The visualized paranasal sinuses, orbits, mastoid air cells are otherwise unremarkable.           No acute intracranial hemorrhage or hydrocephalus. If there is further clinical concern, MRI could be considered for further evaluation.  This report was finalized on 7/16/2022 6:02 AM by Dr. Josr Paez M.D.        I ordered the above noted radiological studies. Reviewed by me and discussed with radiologist.  See dictation for official radiology interpretation.      PROCEDURES    Procedures      MEDICATIONS GIVEN IN ER    Medications   sodium chloride 0.9 % flush 10 mL (has no administration in time range)   ketorolac (TORADOL) injection 15 mg (15 mg Intravenous Given 7/16/22 0657)   metoclopramide (REGLAN) injection 10 mg (10 mg Intravenous Given 7/16/22 0656)   diphenhydrAMINE (BENADRYL) injection 25 mg (25 mg Intravenous Given 7/16/22 0654)   labetalol (NORMODYNE,TRANDATE) injection 10 mg (10 mg Intravenous Given 7/16/22 0708)         PROGRESS, DATA ANALYSIS, CONSULTS, AND MEDICAL DECISION MAKING    All labs have been independently reviewed by me.  All radiology studies have been  reviewed by me and discussed with radiologist dictating the report.   EKG's independently viewed and interpreted by me.  Discussion below represents my analysis of pertinent findings related to patient's condition, differential diagnosis, treatment plan and final disposition.    Differential diagnosis for headache includes but is not limited to:  - subarachnoid hemorrhage  - intracranial mass  - stroke  - RCVS  - meningitis  - glaucoma  - giant cell arteritis  - CO poisoning  - cerebral venous sinus thrombosis  - migraine  - PRES  - HTN encephalopathy    ED Course as of 07/17/22 0001   Sat Jul 16, 2022   0630 First look: Patient presents with hypertension and right-sided headache.  Patient states that headache feels like prior headaches but is more prolonged.  Has had some nausea and vomiting which not usual with her headaches.  Also states she checked her blood pressure and found that it was elevated states this is also not unusual for her.  States that she checked the Internet for ways to drop her blood pressure and read a suggestion that she should drink some salt water which she did but it did not improve her headache.  No tingling no numbness no weakness.  No visual disturbances.  No fevers or chills.  I briefly spoke with the patient and started her work-up. [TJ]   0824 Troponin T: <0.010 [TD]   0824 Creatinine: 0.78 [TD]   0824 Sodium: 143 [TD]   0824 Potassium: 3.6 [TD]   0824 WBC: 6.26 [TD]   0824 BP: 165/73 [TD]   0824 CT head is acutely negative.  No intracranial hemorrhage or hydrocephalus. [TD]   0840 EKG interpreted by myself.  Time 2:13 AM.  Sinus rhythm.  Heart rate 54.  Normal intervals and axis.  No acute ST abnormality.  There is underlying artifact that limits interpretation. [TD]   0841 Sed Rate: 7 [TD]      ED Course User Index  [TD] Fred Razo II, MD  [TJ] Fred Patel MD       Headache is improved considerably where it is now mild following her medication.  Her blood  pressure is also improved.    I will start the patient on hydrochlorothiazide for improved blood pressure control.  She will follow-up with her PCP.      I have low suspicion for giant cell arteritis especially given low ESR/CRP.      PPE: The patient wore a surgical mask throughout the entire patient encounter. I wore an N95.    AS OF 08:38 EDT VITALS:    BP - 165/73  HR - 58  TEMP - 97.1 °F (36.2 °C) (Tympanic)  O2 SATS - 97%        DIAGNOSIS  Final diagnoses:   Primary hypertension   Nonintractable headache, unspecified chronicity pattern, unspecified headache type         DISPOSITION  DISCHARGE    FOLLOW-UP  Josep Kelley MD  [address] [facility] [id number] [phone]    Schedule an appointment as soon as possible for a visit in 2 days           Medication List      New Prescriptions    hydroCHLOROthiazide 12.5 MG tablet  Commonly known as: HYDRODIURIL  Take 1 tablet by mouth Daily for 30 days.           Where to Get Your Medications      These medications were sent to [id number] - [place], [place] - [address] AT [facility] [facility] - [phone] [facility] [phone] [address] [address]    Phone: [phone]   · hydroCHLOROthiazide 12.5 MG tablet                  Fred Razo II, MD  07/17/22 0002

## 2022-07-16 NOTE — ED NOTES
"Pt ambulatory to triage with a complaint of an elevated blood pressure reporting it to be 205/101 then 203/91. Pt reports a headache and feels \"sick to her stomach.\" Pt reports her vision is \"spacy and\" feels off-balanced. Pt reports taking metoprolol which she states she takes as prescribed.   "

## 2022-07-18 ENCOUNTER — TELEPHONE (OUTPATIENT)
Dept: CARDIOLOGY | Facility: CLINIC | Age: 77
End: 2022-07-18

## 2022-07-18 RX ORDER — AMLODIPINE BESYLATE 5 MG/1
5 TABLET ORAL DAILY
Qty: 90 TABLET | Refills: 3 | Status: SHIPPED | OUTPATIENT
Start: 2022-07-18 | End: 2022-08-10 | Stop reason: ALTCHOICE

## 2022-07-18 NOTE — TELEPHONE ENCOUNTER
Looks like she was seen in the ER.  Will need follow-up with us in the next week or 2 either with myself or our nurse practitioner to get follow-up labs and she was started on hydrochlorothiazide and to recheck blood pressure.

## 2022-07-18 NOTE — TELEPHONE ENCOUNTER
Late entry: The patient called at 12:14 AM on 7/16/2022 with complaints of blood pressure of 201/101, heart rate of 49, with a headache, but no other symptoms.  She stated that she was currently in the emergency department awaiting treatment.  I advised that this was a good plan.  All questions and concerns addressed at this time, understanding and agreement verbalized.    Lian Mckeon, WALTER

## 2022-07-21 ENCOUNTER — TELEPHONE (OUTPATIENT)
Dept: CARDIOLOGY | Facility: CLINIC | Age: 77
End: 2022-07-21

## 2022-07-21 NOTE — TELEPHONE ENCOUNTER
----- Message from Della Vines sent at 7/21/2022 11:33 AM EDT -----  Regarding: Medications  Amlodopine 5 mg has been discontinued by primary doctor . Is now takeing Valsartan.

## 2022-08-10 ENCOUNTER — OFFICE VISIT (OUTPATIENT)
Dept: CARDIOLOGY | Facility: CLINIC | Age: 77
End: 2022-08-10

## 2022-08-10 VITALS
SYSTOLIC BLOOD PRESSURE: 168 MMHG | WEIGHT: 115.4 LBS | BODY MASS INDEX: 20.45 KG/M2 | HEIGHT: 63 IN | HEART RATE: 49 BPM | DIASTOLIC BLOOD PRESSURE: 72 MMHG

## 2022-08-10 DIAGNOSIS — Z86.73 HISTORY OF CVA (CEREBROVASCULAR ACCIDENT): ICD-10-CM

## 2022-08-10 DIAGNOSIS — I10 ESSENTIAL HYPERTENSION: Primary | ICD-10-CM

## 2022-08-10 DIAGNOSIS — I25.10 CORONARY ARTERY DISEASE INVOLVING NATIVE CORONARY ARTERY OF NATIVE HEART WITHOUT ANGINA PECTORIS: ICD-10-CM

## 2022-08-10 PROCEDURE — 99214 OFFICE O/P EST MOD 30 MIN: CPT | Performed by: INTERNAL MEDICINE

## 2022-08-10 PROCEDURE — 93000 ELECTROCARDIOGRAM COMPLETE: CPT | Performed by: INTERNAL MEDICINE

## 2022-08-10 RX ORDER — VALSARTAN 80 MG/1
80 TABLET ORAL DAILY
COMMUNITY
Start: 2022-07-18

## 2022-08-10 NOTE — PROGRESS NOTES
[facility] Follow Up Office Note     Encounter Date:08/10/22  Patient:Della Vines  :1945  MRN:[id number]      Chief Complaint:   Chief Complaint   Patient presents with   • BHE F/U     History of Presenting Illness:      Ms. Vines is a 76 y.o. woman with past medical history notable for coronary artery disease status post percutaneous intervention, TIAs, hypertension, mixed hyperlipidemia, and chronic lung disease who presents to my office for follow-up.  Since her last appointment patient unfortunately was in the hospital with headache and hypertensive urgency.  She was having a lot of issues with her amlodipine and this had to be stopped due to leg swelling.  Her primary care physician is doing excellent job with making some adjustments with her medicines.  She is now on valsartan 80 mg which she just started a few days ago.  She has plans to have follow-up labs in a few days and potential further titration pending her lab work and blood pressure response.  At home her blood pressure typically runs in the 130s.  She is tolerating this much better.  In the past we also had her on hydrochlorothiazide but due to urinary retention issues and bladder leakage she was not really able to tolerate this previously.       Review of Systems:  Review of Systems   Constitutional: Negative.   HENT: Negative.    Eyes: Negative.    Cardiovascular: Negative.    Respiratory: Negative.    Endocrine: Negative.    Hematologic/Lymphatic: Negative.    Skin: Negative.    Musculoskeletal: Negative.    Gastrointestinal: Negative.    Genitourinary: Negative.    Neurological: Negative.    Psychiatric/Behavioral: Negative.    Allergic/Immunologic: Negative.        Current Outpatient Medications on File Prior to Visit   Medication Sig Dispense Refill   • aspirin (aspirin) 81 MG EC tablet Take 1 tablet by mouth Daily. 90 tablet 3   • atorvastatin (LIPITOR) 40 MG tablet TAKE 1 TABLET BY MOUTH DAILY 90 tablet 3   • calcium  carbonate (OS-JERRELL) 1250 (500 Ca) MG tablet Take 1 tablet by mouth.     • Cholecalciferol 2000 units tablet Take 4,000 Int'l Units by mouth.     • metoprolol succinate XL (TOPROL-XL) 50 MG 24 hr tablet Take 1 tablet by mouth Daily. 90 tablet 3   • nitroglycerin (NITROSTAT) 0.4 MG SL tablet Place 1 tablet under the tongue Every 5 (Five) Minutes As Needed for Chest Pain. Place one tablet under tongue as needed for chest pain. 25 tablet 3   • pantoprazole (PROTONIX) 40 MG EC tablet TAKE 1 TABLET BY MOUTH EVERY MORNING 90 tablet 3   • topiramate (TOPAMAX) 25 MG tablet TK 1 T PO BID  3   • valsartan (DIOVAN) 80 MG tablet Take 80 mg by mouth Daily.     • [DISCONTINUED] amLODIPine (NORVASC) 5 MG tablet TAKE 1 TABLET BY MOUTH DAILY 90 tablet 3   • [DISCONTINUED] hydroCHLOROthiazide (HYDRODIURIL) 12.5 MG tablet Take 1 tablet by mouth Daily for 30 days. 30 tablet 0   • [DISCONTINUED] Mirabegron ER (MYRBETRIQ) 50 MG tablet sustained-release 24 hour 24 hr tablet Take 50 mg by mouth Daily.       Current Facility-Administered Medications on File Prior to Visit   Medication Dose Route Frequency Provider Last Rate Last Admin   • nitroglycerin (NITROSTAT) SL tablet 0.4 mg  0.4 mg Sublingual Q5 Min PRN Dheeraj Huber MD       • sodium chloride 0.9 % flush 10 mL  10 mL Intravenous PRN Dheeraj Huber MD           Allergies   Allergen Reactions   • Codeine    • Erythromycin Diarrhea     Side effect    • Iodine Hives   • Lisinopril Other (See Comments)     COUGH   • Morphine Itching and Delirium   • Moxifloxacin      Elevated liver enzymes   • Sulfa Antibiotics    • Pneumococcal Vaccines Swelling and Rash       Past Medical History:   Diagnosis Date   • Abnormal stress test 01/13/2020    Added automatically from request for surgery [id number]   • Acid reflux disease    • Bell's palsy    • Bronchiectasis without complication (HCC)     Dr Campbell   • Cerebellar infarction (HCC)    • Chest pain    • CVA (cerebral vascular accident)  ([facility]) 05/19/2014    Overview:  Per old records in distant past in internal capsule   • Hyperlipidemia    • Hypertension    • Lung disease    • Osteoporosis    • PAF (paroxysmal atrial fibrillation) ([facility])    • Stroke ([facility])    • TIA (transient ischemic attack)    • Urinary tract infection        Past Surgical History:   Procedure Laterality Date   • ADENOIDECTOMY     • APPENDECTOMY     • BREAST FIBROADENOMA SURGERY     • CARDIAC CATHETERIZATION N/A 1/17/2020    Procedure: Left Heart Cath;  Surgeon: Kingsley Zimmerman MD;  Location:  [facility];  Service: Cardiovascular   • CARDIAC CATHETERIZATION N/A 1/17/2020    Procedure: Coronary angiography;  Surgeon: Kingsley Zimmerman MD;  Location:  [facility];  Service: Cardiovascular   • CARDIAC CATHETERIZATION N/A 1/17/2020    Procedure: Left ventriculography;  Surgeon: Kingsley Zimmerman MD;  Location:  [facility];  Service: Cardiovascular   • CARDIAC CATHETERIZATION N/A 1/17/2020    Procedure: Percutaneous Coronary Intervention;  Surgeon: Kingsley Zimmerman MD;  Location:  [facility];  Service: Cardiovascular   • CARDIAC CATHETERIZATION N/A 1/17/2020    Procedure: Stent GERA coronary;  Surgeon: Kingsley Zimmerman MD;  Location:  [facility];  Service: Cardiovascular   • CARDIAC CATHETERIZATION N/A 4/4/2022    Procedure: Coronary angiography;  Surgeon: Kvng Burgos MD;  Location:  [facility];  Service: Cardiovascular;  Laterality: N/A;   • CARDIAC CATHETERIZATION N/A 4/4/2022    Procedure: Left heart cath;  Surgeon: Kvng Burgos MD;  Location:  [facility];  Service: Cardiovascular;  Laterality: N/A;   • CARDIAC CATHETERIZATION N/A 4/4/2022    Procedure: Left ventriculography;  Surgeon: Kvng Burgos MD;  Location:  [facility];  Service: Cardiovascular;  Laterality: N/A;   • HYSTERECTOMY     • MASTOID SURGERY     •  "TONSILLECTOMY         Social History     Socioeconomic History   • Marital status:      Spouse name: Tyrone Vines   • Number of children: 1   • Years of education: 14   Tobacco Use   • Smoking status: Former Smoker     Packs/day: 1.50     Years: 30.00     Pack years: 45.00     Start date:      Quit date:      Years since quittin.6   • Smokeless tobacco: Never Used   • Tobacco comment: caffeine use: 1 CUP COFFEE DAILY    Substance and Sexual Activity   • Alcohol use: No   • Drug use: No   • Sexual activity: Defer       Family History   Problem Relation Age of Onset   • Heart failure Father    • Hypertension Father    • Heart disease Father    • Breast cancer Mother    • Aneurysm Daughter        The following portions of the patient's history were reviewed and updated as appropriate: allergies, current medications, past family history, past medical history, past social history, past surgical history and problem list.       Objective:       Vitals:    08/10/22 1231   BP: 168/72   BP Location: Left arm   Patient Position: Sitting   Pulse: (!) 49   Weight: 52.3 kg (115 lb 6.4 oz)   Height: 160 cm (63\")     Body mass index is 20.44 kg/m².     Physical Exam:  Constitutional: Well appearing, well developed, no acute distress   HENT: Oropharynx clear and membrane moist  Eyes: Normal conjunctiva, no sclera icterus.  Neck: Supple, no carotid bruit bilaterally.  Cardiovascular: Regular rate and regular rhythm, no murmur, no lower extremity edema.  Pulmonary: Normal respiratory effort, no lung sounds, no wheezing.  Abdominal: Soft, nontender, no hepatosplenomegaly, liver is non-pulsatile.  Neurological: Alert and orient x 3.   Skin: Warm, dry, no ecchymosis, no rash.  Psych: Appropriate mood and affect. Normal judgment and insight.       Lab Results   Component Value Date    GLUCOSE 93 2022    BUN 15 2022    CREATININE 0.78 2022    EGFRIFNONA 100 2020    BCR 19.2 2022    K 3.6 " 2022    CO2 25.9 2022    CALCIUM 9.7 2022    ALBUMIN 4.70 2022    LABIL2 2.2 2022    AST 17 2022    ALT 15 2022       Lab Results   Component Value Date    WBC 6.26 2022    HGB 14.2 2022    HCT 42.6 2022    MCV 88.8 2022     2022       Lab Results   Component Value Date    CHLPL 125 2020    CHLPL 125 2020    CHLPL 120 2020     Lab Results   Component Value Date    TRIG 94 2020    TRIG 112 2020    TRIG 100 2020     Lab Results   Component Value Date    HDL 44 (L) 2020    HDL 46 (L) 2020    HDL 41 (L) 2020     Lab Results   Component Value Date    LDL 62 2020    LDL 57 2020    LDL 59 2020     CMP 9/3/2021:  Sodium 145  Potassium 4.8  Chloride 107  CO2 24  BUN 15  Creatinine 0.6  ALT 16  AST 24  Alk phos 84    Lipid Panel 9/3/2021:  Total cholesterol: 132  Tri  HDL: 42  LDL: 69      ECG 12 Lead    Date/Time: 8/10/2022 2:39 PM  Performed by: Kingsley Zimmerman MD  Authorized by: Kingsley Zimmerman MD   Comparison: compared with previous ECG from 2022  Similar to previous ECG  Rhythm: sinus bradycardia        Holter 2022:  · A normal monitor study.  · Underlying heart rhythm is sinus rhythm with an average heart rate of of 57 bpm and a heart rate range of 46 bpm up to 79 bpm  · Patient reported events correlated with underlying sinus rhythm.    Cardiac Catheterization 2022:  · Left main: Normal  · Left anterior descending: Normal proximally there is a long stent in the proximal and mid LAD that is widely patent and really looks fantastic 20% disease in the mid distal LAD and then normal distally the diagonals first diagonal is free of disease a second diagonal is small with a 90% ostial lesion it is covered by the stent not amenable to PCI does not look like it is changed much  · Ramus intermedius:Not present  · Circumflex: 20 to 30% proximal disease  "the remainder of the vessels normal  · RCA: Is a dominant vessel.  20 to 30% proximal disease 20% mid disease normal distally    Stress MPI 4/4/2022:  · Patient complained of 9/10 chest pain with exertion  · Blood pressure demonstrated a hypertensive response to stress.  · A horizontal ST segment depression of 1 mm in the inferolateral leads was noted during stress (II, III, aVF, V5, V6 and V4), beginning at 4 minutes of stress, and returning to baseline after more than 9 minutes of recovery.  · Myocardial perfusion imaging indicates a normal myocardial perfusion study with no evidence of ischemia.  · Left ventricular ejection fraction is hyperdynamic (Calculated EF > 70%). .  · Because of severe chest pain, patient being evaluated further in [facility].    Cardiac Catheterization 1/17/2020:  · Severe single-vessel coronary artery disease with a long stenoses involving the proximal to mid LAD at 90%.  There is 40% ostial circumflex stenoses and scattered 20% stenoses in the RCA.  · Normal left ventricular function at 60% with a normal left ventricular filling pressure 11 mmHg.  · Successful revascularization of proximal to mid LAD with placement of overlapping 2.25 x 33 mm and 2.25 x 8 mm Xience drug-eluting stents with the mid and proximal segments postdilated with a noncompliant 2.5 mm balloon to 16 roland.    Mobile Telemetry 1/16/2020:  · A normal monitor study.  · Underlying heart rhythm was sinus with an average rate of 86 bpm no significant arrhythmias noted during her study    Stress MPI 1/7/2020:  · Patient complained of 8/10 chest pressure and \"burning sensation\" with exertion  · Myocardial perfusion imaging indicates a normal myocardial perfusion study with no evidence of ischemia.  · Left ventricular ejection fraction is severely reduced (Calculated EF = 22%).  · The gating does not appear to accurate on this study. Would recommend correlation with echocardiogram.    Carotid Duplex 1/7/2020:  · Distal right " internal carotid artery mild stenosis.  · Mid left internal carotid artery mild stenosis.     Assessment:          Diagnosis Plan   1. Essential hypertension  ECG 12 Lead   2. Coronary artery disease involving native coronary artery of native heart without angina pectoris     3. History of CVA (cerebrovascular accident)            Plan:       Ms. Vines is a 76 y.o. woman with past medical history notable for coronary artery disease status post percutaneous intervention, TIAs, hypertension, mixed hyperlipidemia, and chronic lung disease who presents to my office for follow-up.  Overall patient is doing okay.  I agree with the plan from her primary care physician is doing an excellent job of managing her blood pressure.  She wanted to see me as well given some of her past heart issues but would like to try and keep things simple and not have too many people making changes to her medicine regimen.  Sounds like she is already got follow-up with her primary care and probably could titrate her losartan further if electrolytes and kidney function are normal if need be.  Another option from my and could be changing her metoprolol over to carvedilol if not able to titrate her valsartan any further or having any issues with that or not reaching goal.  For the time being I will defer to her primary care physician who again is doing an excellent job with her medicines.      Coronary artery disease without angina:  · Continue aspirin   · Continue statin  · Continue beta-blocker     · CBC 7/2022 demonstrates normal platelet count    Hypertension:  · Challenging to control due to intolerance of many medications  · Unable to tolerate amlodipine due to leg swelling  · Had issues with leg cramping on chlorthalidone  · Had bladder leakage on other diuretics including hydrochlorothiazide  · Continue metoprolol succinate but could change to carvedilol as an opportunity to optimize blood pressure  · Consider adding spironolactone  · BMP  7/2022 demonstrates normal sodium, potassium, and creatinine     Mixed hyperlipidemia:  · Patient currently on statin therapy  · Lipid panel 9/2021 demonstrates good LDL control  · CMP 4/2021 demonstrates normal ALT and AST     History of CVA:  · Continue aspirin and statin      Follow-up:  November      Kingsley Zimmerman MD  [facility]  08/10/22  14:39 EDT

## 2022-08-11 ENCOUNTER — TELEPHONE (OUTPATIENT)
Dept: CARDIOLOGY | Facility: CLINIC | Age: 77
End: 2022-08-11

## 2022-08-11 NOTE — TELEPHONE ENCOUNTER
Pt called stating that she has a dentist apt tomorrow. She will be having her teeth clean. She has been off of her 81 mg aspirin for two weeks. When she was last seen by her dentist, she bled for a few hours on the blood thinner.     She would like to know if she should be ok to go to her apt tomorrow? From a bleeding perceptive?    She can be reached at [phone]

## 2022-08-11 NOTE — TELEPHONE ENCOUNTER
Talked with the patient's  as she was in the store.  She has been off of aspirin for plenty of time.  Typically after 7 days aspirin should be out of your system.  Would be fine for her to proceed forward with her teeth cleaning any bleeding would not be from the aspirin as it is out of her system.

## 2022-11-10 ENCOUNTER — OFFICE VISIT (OUTPATIENT)
Dept: CARDIOLOGY | Facility: CLINIC | Age: 77
End: 2022-11-10

## 2022-11-10 VITALS
WEIGHT: 114 LBS | BODY MASS INDEX: 20.2 KG/M2 | HEART RATE: 47 BPM | HEIGHT: 63 IN | DIASTOLIC BLOOD PRESSURE: 72 MMHG | SYSTOLIC BLOOD PRESSURE: 144 MMHG

## 2022-11-10 DIAGNOSIS — I10 ESSENTIAL HYPERTENSION: ICD-10-CM

## 2022-11-10 DIAGNOSIS — I25.10 CORONARY ARTERY DISEASE INVOLVING NATIVE CORONARY ARTERY OF NATIVE HEART WITHOUT ANGINA PECTORIS: Primary | ICD-10-CM

## 2022-11-10 PROCEDURE — 93000 ELECTROCARDIOGRAM COMPLETE: CPT | Performed by: NURSE PRACTITIONER

## 2022-11-10 PROCEDURE — 99214 OFFICE O/P EST MOD 30 MIN: CPT | Performed by: NURSE PRACTITIONER

## 2022-11-10 NOTE — PROGRESS NOTES
Date of Office Visit: 11/10/2022  Encounter Provider: WALTER Greenwood  Place of Service: [facility]  Patient Name: Della Vines  :1945    Chief Complaint   Patient presents with   • Coronary Artery Disease   :     HPI: Della Vines is a 76 y.o. female.  She is a patient of Dr. Zimmerman's whom we follow for hypertension, hyperlipidemia, and coronary artery disease.  In , she underwent PCI of the proximal LAD.     In April of this year, she was seen in the [facility] for exertional chest pain.  Due to an abnormal stress test, she was referred for repeat cardiac catheterization.  This demonstrated mild to moderate nonobstructive disease including a 90 percent second diagonal not amenable to PCI.  It was suspected some of her symptoms are related to ectopy.  She wore a 48-hour Holter which was normal.   In July, she presented to the ED with headaches was treated for hypertensive urgency.  She was started on HCTZ.  Subsequently the amlodipine was discontinued due to leg swelling.   She was last seen in the office by Dr. Zimmerman in August at which time her blood pressures were much improved.  Her PCP had been adjusting her antihypertensives to which she had responded very well.  Overall Dr. Zimmerman felt she was doing well.  No changes were made to her regimen, and she was advised to follow-up in 3 months.   Overall, she has been doing well.  She reports an occasional heaviness in her chest which correlates with increased stress.  She denies any shortness of breath, palpitations, edema, dizziness, or syncope.  Reportedly she has been holding her aspirin essentially since the end of August due to dental procedures.  She is terrified of bleeding following a bad experience several years ago following a dental procedure when she bled for approximately 3 hours.    Past Medical History:   Diagnosis Date   • Abnormal stress test 2020    Added automatically from request  for surgery [id number]   • Acid reflux disease    • Bell's palsy    • Bronchiectasis without complication ([facility])     Dr Campbell   • Cerebellar infarction ([facility])    • Chest pain    • CVA (cerebral vascular accident) ([facility]) 05/19/2014    Overview:  Per old records in distant past in internal capsule   • Hyperlipidemia    • Hypertension    • Lung disease    • Osteoporosis    • PAF (paroxysmal atrial fibrillation) ([facility])    • Stroke ([facility])    • TIA (transient ischemic attack)    • Urinary tract infection        Past Surgical History:   Procedure Laterality Date   • ADENOIDECTOMY     • APPENDECTOMY     • BREAST FIBROADENOMA SURGERY     • CARDIAC CATHETERIZATION N/A 1/17/2020    Procedure: Left Heart Cath;  Surgeon: Kingsley Zimmerman MD;  Location:  [facility];  Service: Cardiovascular   • CARDIAC CATHETERIZATION N/A 1/17/2020    Procedure: Coronary angiography;  Surgeon: Kingsley Zimmerman MD;  Location:  [facility];  Service: Cardiovascular   • CARDIAC CATHETERIZATION N/A 1/17/2020    Procedure: Left ventriculography;  Surgeon: Kingsley Zimmerman MD;  Location:  [facility];  Service: Cardiovascular   • CARDIAC CATHETERIZATION N/A 1/17/2020    Procedure: Percutaneous Coronary Intervention;  Surgeon: Kingsley Zimmerman MD;  Location:  [facility];  Service: Cardiovascular   • CARDIAC CATHETERIZATION N/A 1/17/2020    Procedure: Stent GERA coronary;  Surgeon: Kingsley Zimmerman MD;  Location:  [facility];  Service: Cardiovascular   • CARDIAC CATHETERIZATION N/A 4/4/2022    Procedure: Coronary angiography;  Surgeon: Kvng Burgos MD;  Location:  [facility];  Service: Cardiovascular;  Laterality: N/A;   • CARDIAC CATHETERIZATION N/A 4/4/2022    Procedure: Left heart cath;  Surgeon: Kvng Burgos MD;  Location:  [facility];  Service: Cardiovascular;  Laterality: N/A;   • CARDIAC CATHETERIZATION N/A  2022    Procedure: Left ventriculography;  Surgeon: Kvng Burgos MD;  Location: [facility] INVASIVE LOCATION;  Service: Cardiovascular;  Laterality: N/A;   • HYSTERECTOMY     • MASTOID SURGERY     • TONSILLECTOMY         Social History     Socioeconomic History   • Marital status:      Spouse name: Tyrone Vines   • Number of children: 1   • Years of education: 14   Tobacco Use   • Smoking status: Former     Packs/day: 1.50     Years: 30.00     Pack years: 45.00     Types: Cigarettes     Start date:      Quit date:      Years since quittin.8   • Smokeless tobacco: Never   • Tobacco comments:     caffeine use: 1 CUP COFFEE DAILY    Substance and Sexual Activity   • Alcohol use: No   • Drug use: No   • Sexual activity: Defer       Family History   Problem Relation Age of Onset   • Heart failure Father    • Hypertension Father    • Heart disease Father    • Breast cancer Mother    • Aneurysm Daughter        Review of Systems   Constitutional: Negative.   Cardiovascular: Negative.  Negative for chest pain, dyspnea on exertion, leg swelling, orthopnea, paroxysmal nocturnal dyspnea and syncope.   Respiratory: Negative.    Hematologic/Lymphatic: Negative for bleeding problem.   Musculoskeletal: Negative for falls.   Gastrointestinal: Negative for melena.   Neurological: Negative for dizziness and light-headedness.       Allergies   Allergen Reactions   • Codeine    • Erythromycin Diarrhea     Side effect    • Iodine Hives   • Lisinopril Other (See Comments)     COUGH   • Morphine Itching and Delirium   • Moxifloxacin      Elevated liver enzymes   • Sulfa Antibiotics    • Pneumococcal Vaccines Swelling and Rash         Current Outpatient Medications:   •  atorvastatin (LIPITOR) 40 MG tablet, TAKE 1 TABLET BY MOUTH DAILY, Disp: 90 tablet, Rfl: 3  •  calcium carbonate (OS-JERRELL) 1250 (500 Ca) MG tablet, Take 1 tablet by mouth., Disp: , Rfl:   •  Cholecalciferol 2000 units tablet, Take 4,000 Int'l  "Units by mouth., Disp: , Rfl:   •  metoprolol succinate XL (TOPROL-XL) 50 MG 24 hr tablet, Take 1 tablet by mouth Daily., Disp: 90 tablet, Rfl: 3  •  nitroglycerin (NITROSTAT) 0.4 MG SL tablet, Place 1 tablet under the tongue Every 5 (Five) Minutes As Needed for Chest Pain. Place one tablet under tongue as needed for chest pain., Disp: 25 tablet, Rfl: 3  •  pantoprazole (PROTONIX) 40 MG EC tablet, TAKE 1 TABLET BY MOUTH EVERY MORNING, Disp: 90 tablet, Rfl: 3  •  valsartan (DIOVAN) 80 MG tablet, Take 80 mg by mouth Daily., Disp: , Rfl:   •  aspirin (aspirin) 81 MG EC tablet, Take 1 tablet by mouth Daily. (Patient taking differently: Take 1 tablet by mouth Daily. Stopped to due to dental procedure), Disp: 90 tablet, Rfl: 3    Current Facility-Administered Medications:   •  nitroglycerin (NITROSTAT) SL tablet 0.4 mg, 0.4 mg, Sublingual, Q5 Min PRN, Dheeraj Huber MD  •  sodium chloride 0.9 % flush 10 mL, 10 mL, Intravenous, PRN, Dheeraj Huber MD      Objective:     Vitals:    11/10/22 1339   BP: 144/72   Pulse: (!) 47   Weight: 51.7 kg (114 lb)   Height: 160 cm (63\")     Body mass index is 20.19 kg/m².    PHYSICAL EXAM:    Neck:      Vascular: No JVD.   Pulmonary:      Effort: Pulmonary effort is normal.      Breath sounds: Normal breath sounds.   Cardiovascular:      Normal rate. Regular rhythm.      Murmurs: There is no murmur.      No gallop. No click. No rub.   Pulses:     Intact distal pulses.           ECG 12 Lead    Date/Time: 11/10/2022 1:42 PM  Performed by: Zoey Flower APRN  Authorized by: Zoey Flower APRN   Comparison: compared with previous ECG from 8/10/2022  Similar to previous ECG  Rhythm: sinus bradycardia  Rate: bradycardic  BPM: 47                Assessment:       Diagnosis Plan   1. Coronary artery disease involving native coronary artery of native heart without angina pectoris  ECG 12 Lead      2. Essential hypertension          Orders Placed This Encounter "   Procedures   • ECG 12 Lead     This order was created via procedure documentation     Order Specific Question:   Release to patient     Answer:   Routine Release          Plan:       1.  Coronary artery disease.  History of PCI of the proximal LAD in 2020.  Repeat cardiac catheterization in April demonstrated mild to moderate nonobstructive disease.  She had a 90% second diagonal not amendable to PCI.  She denies any symptoms of angina.  I did encourage her to resume aspirin.  Continue atorvastatin.      2.  Hypertension.  Her blood pressure stable.  She reports blood pressures at home averaging in the 130s.  Continue Toprol and valsartan.      I think she is doing well.  I am not recommending any changes today, and she will follow-up with Dr. Zimmerman in 6 months      As always, it has been a pleasure to participate in your patient's care.      Sincerely,         WALTER Malone

## 2023-02-14 ENCOUNTER — TELEPHONE (OUTPATIENT)
Dept: CARDIOLOGY | Facility: CLINIC | Age: 78
End: 2023-02-14
Payer: MEDICARE

## 2023-02-14 RX ORDER — ATORVASTATIN CALCIUM 40 MG/1
40 TABLET, FILM COATED ORAL DAILY
Qty: 90 TABLET | Refills: 3 | Status: SHIPPED | OUTPATIENT
Start: 2023-02-14

## 2023-02-14 RX ORDER — METOPROLOL SUCCINATE 50 MG/1
50 TABLET, EXTENDED RELEASE ORAL DAILY
Qty: 90 TABLET | Refills: 3 | Status: SHIPPED | OUTPATIENT
Start: 2023-02-14

## 2023-02-14 NOTE — TELEPHONE ENCOUNTER
Called and spoke to patient. She feels well and did not have symptoms with elevated BP.    Will take BP at home 2 hours after meds and again in afternoon this week.  I will plan to call her next week to review BP log.

## 2023-02-14 NOTE — TELEPHONE ENCOUNTER
Patient called stating that she was at [facility] yesterday. She went in to get a vascular test done for her legs. While she was there her B/P was  287/112.   She was admitted in the hospital, and stayed overnight for observation.     B/P was going up and down.Once discharged her B/P was 159/69 finally. This morning it was 142/72 this morning. She stated that her PCP increased valsartan from 80 mg 160 mg nightly  last week. She is also taking Metoprolol 50 mg daily.     She can be reached at [phone]

## 2023-02-28 ENCOUNTER — TELEPHONE (OUTPATIENT)
Dept: CARDIOLOGY | Facility: CLINIC | Age: 78
End: 2023-02-28
Payer: MEDICARE

## 2023-05-18 ENCOUNTER — OFFICE VISIT (OUTPATIENT)
Dept: CARDIOLOGY | Facility: CLINIC | Age: 78
End: 2023-05-18
Payer: MEDICARE

## 2023-05-18 VITALS
SYSTOLIC BLOOD PRESSURE: 150 MMHG | WEIGHT: 116.2 LBS | DIASTOLIC BLOOD PRESSURE: 80 MMHG | BODY MASS INDEX: 20.59 KG/M2 | HEART RATE: 48 BPM | HEIGHT: 63 IN

## 2023-05-18 DIAGNOSIS — I25.10 CORONARY ARTERY DISEASE INVOLVING NATIVE CORONARY ARTERY OF NATIVE HEART WITHOUT ANGINA PECTORIS: Primary | ICD-10-CM

## 2023-05-18 DIAGNOSIS — I10 ESSENTIAL HYPERTENSION: ICD-10-CM

## 2023-05-18 DIAGNOSIS — Z86.73 HISTORY OF CVA (CEREBROVASCULAR ACCIDENT): ICD-10-CM

## 2023-05-18 DIAGNOSIS — E78.2 MIXED HYPERLIPIDEMIA: ICD-10-CM

## 2023-05-18 RX ORDER — VALSARTAN 160 MG/1
1 TABLET ORAL DAILY
COMMUNITY
Start: 2023-04-28

## 2023-05-18 NOTE — PROGRESS NOTES
[facility] Follow Up Office Note     Encounter Date:23  Patient:Della Vines  :1945  MRN:[id number]      Chief Complaint:   Chief Complaint   Patient presents with   • Coronary Artery Disease     6 month f/u     History of Presenting Illness:      Ms. Vines is a 77 y.o. woman with past medical history notable for coronary artery disease status post percutaneous intervention, TIAs, hypertension, mixed hyperlipidemia, and chronic lung disease who presents to my office for follow-up.  Overall she is doing well.  No new problems.  Blood pressure is elevated in the office today but at home she is getting better readings and was in the 120s at her primary care physician's office last week.         Review of Systems:  Review of Systems   Constitutional: Negative.   HENT: Negative.    Eyes: Negative.    Cardiovascular: Negative.    Respiratory: Negative.    Endocrine: Negative.    Hematologic/Lymphatic: Negative.    Skin: Negative.    Musculoskeletal: Negative.    Gastrointestinal: Negative.    Genitourinary: Negative.    Neurological: Negative.    Psychiatric/Behavioral: Negative.    Allergic/Immunologic: Negative.        Current Outpatient Medications on File Prior to Visit   Medication Sig Dispense Refill   • aspirin (aspirin) 81 MG EC tablet Take 1 tablet by mouth Daily. (Patient taking differently: Take 1 tablet by mouth Daily. Stopped to due to dental procedure) 90 tablet 3   • atorvastatin (LIPITOR) 40 MG tablet TAKE 1 TABLET BY MOUTH DAILY 90 tablet 3   • calcium carbonate (OS-JERRELL) 1250 (500 Ca) MG tablet Take 1 tablet by mouth.     • Cholecalciferol 2000 units tablet Take 4,000 Int'l Units by mouth.     • metoprolol succinate XL (TOPROL-XL) 50 MG 24 hr tablet TAKE 1 TABLET BY MOUTH DAILY 90 tablet 3   • nitroglycerin (NITROSTAT) 0.4 MG SL tablet Place 1 tablet under the tongue Every 5 (Five) Minutes As Needed for Chest Pain. Place one tablet under tongue as needed for chest pain. 25 tablet 3    • pantoprazole (PROTONIX) 40 MG EC tablet TAKE 1 TABLET BY MOUTH EVERY MORNING 90 tablet 3   • valsartan (DIOVAN) 160 MG tablet Take 1 tablet by mouth Daily.     • [DISCONTINUED] valsartan (DIOVAN) 80 MG tablet Take 1 tablet by mouth Daily.       Current Facility-Administered Medications on File Prior to Visit   Medication Dose Route Frequency Provider Last Rate Last Admin   • nitroglycerin (NITROSTAT) SL tablet 0.4 mg  0.4 mg Sublingual Q5 Min PRN Dheeraj Huber MD       • sodium chloride 0.9 % flush 10 mL  10 mL Intravenous PRN Dheeraj Huber MD           Allergies   Allergen Reactions   • Codeine    • Erythromycin Diarrhea     Side effect    • Iodine Hives   • Lisinopril Other (See Comments)     COUGH   • Morphine Itching and Delirium   • Moxifloxacin      Elevated liver enzymes   • Sulfa Antibiotics    • Pneumococcal Vaccines Swelling and Rash       Past Medical History:   Diagnosis Date   • Abnormal stress test 01/13/2020    Added automatically from request for surgery [id number]   • Acid reflux disease    • Bell's palsy    • Bronchiectasis without complication     Dr Campbell   • Cerebellar infarction    • Chest pain    • CVA (cerebral vascular accident) 05/19/2014    Overview:  Per old records in distant past in internal capsule   • Hyperlipidemia    • Hypertension    • Lung disease    • Osteoporosis    • PAF (paroxysmal atrial fibrillation)    • Stroke    • TIA (transient ischemic attack)    • Urinary tract infection        Past Surgical History:   Procedure Laterality Date   • ADENOIDECTOMY     • APPENDECTOMY     • BREAST FIBROADENOMA SURGERY     • CARDIAC CATHETERIZATION N/A 1/17/2020    Procedure: Left Heart Cath;  Surgeon: Kingsley Zimmerman MD;  Location:  [facility];  Service: Cardiovascular   • CARDIAC CATHETERIZATION N/A 1/17/2020    Procedure: Coronary angiography;  Surgeon: Kingsley Zimmerman MD;  Location:  [facility];  Service: Cardiovascular   •  CARDIAC CATHETERIZATION N/A 2020    Procedure: Left ventriculography;  Surgeon: Kingsley Zimmerman MD;  Location:  [facility];  Service: Cardiovascular   • CARDIAC CATHETERIZATION N/A 2020    Procedure: Percutaneous Coronary Intervention;  Surgeon: Kingsley Zimmerman MD;  Location:  [facility];  Service: Cardiovascular   • CARDIAC CATHETERIZATION N/A 2020    Procedure: Stent GERA coronary;  Surgeon: Kingsley Zimmerman MD;  Location:  [facility];  Service: Cardiovascular   • CARDIAC CATHETERIZATION N/A 2022    Procedure: Coronary angiography;  Surgeon: Kvng Burgos MD;  Location:  [facility];  Service: Cardiovascular;  Laterality: N/A;   • CARDIAC CATHETERIZATION N/A 2022    Procedure: Left heart cath;  Surgeon: Kvng Burgos MD;  Location:  [facility];  Service: Cardiovascular;  Laterality: N/A;   • CARDIAC CATHETERIZATION N/A 2022    Procedure: Left ventriculography;  Surgeon: Kvng Burgos MD;  Location:  [facility];  Service: Cardiovascular;  Laterality: N/A;   • HYSTERECTOMY     • MASTOID SURGERY     • TONSILLECTOMY         Social History     Socioeconomic History   • Marital status:      Spouse name: Tyrone Vines   • Number of children: 1   • Years of education: 14   Tobacco Use   • Smoking status: Former     Packs/day: 1.50     Years: 30.00     Pack years: 45.00     Types: Cigarettes     Start date:      Quit date:      Years since quittin.3   • Smokeless tobacco: Never   • Tobacco comments:     caffeine use: 1 CUP COFFEE DAILY    Substance and Sexual Activity   • Alcohol use: No   • Drug use: No   • Sexual activity: Defer       Family History   Problem Relation Age of Onset   • Heart failure Father    • Hypertension Father    • Heart disease Father    • Breast cancer Mother    • Aneurysm Daughter        The following portions of the patient's  "history were reviewed and updated as appropriate: allergies, current medications, past family history, past medical history, past social history, past surgical history and problem list.       Objective:       Vitals:    05/18/23 1326   BP: 150/80   BP Location: Left arm   Patient Position: Sitting   Pulse: (!) 48   Weight: 52.7 kg (116 lb 3.2 oz)   Height: 160 cm (63\")     Body mass index is 20.58 kg/m².     Physical Exam:  Constitutional: Well appearing, well developed, no acute distress   HENT: Oropharynx clear and membrane moist  Eyes: Normal conjunctiva, no sclera icterus.  Neck: Supple, no carotid bruit bilaterally.  Cardiovascular: Regular rate and regular rhythm, no murmur, no lower extremity edema.  Pulmonary: Normal respiratory effort, no lung sounds, no wheezing.  Abdominal: Soft, nontender, no hepatosplenomegaly, liver is non-pulsatile.  Neurological: Alert and orient x 3.   Skin: Warm, dry, no ecchymosis, no rash.  Psych: Appropriate mood and affect. Normal judgment and insight.       Lab Results   Component Value Date    GLUCOSE 93 07/16/2022    BUN 15 07/16/2022    CREATININE 0.78 07/16/2022    EGFRIFNONA 100 01/18/2020    BCR 19.2 07/16/2022    K 3.6 07/16/2022    CO2 25.9 07/16/2022    CALCIUM 9.7 07/16/2022    ALBUMIN 4.70 07/16/2022    LABIL2 2.2 03/04/2022    AST 17 07/16/2022    ALT 15 07/16/2022       Lab Results   Component Value Date    WBC 5.69 10/07/2022    HGB 13.6 10/07/2022    HCT 43.0 10/07/2022    MCV 97.9 10/07/2022     10/07/2022       Lab Results   Component Value Date    CHLPL 125 08/25/2020    CHLPL 125 05/20/2020    CHLPL 120 02/13/2020     Lab Results   Component Value Date    TRIG 94 08/25/2020    TRIG 112 05/20/2020    TRIG 100 02/13/2020     Lab Results   Component Value Date    HDL 44 (L) 08/25/2020    HDL 46 (L) 05/20/2020    HDL 41 (L) 02/13/2020     Lab Results   Component Value Date    LDL 62 08/25/2020    LDL 57 05/20/2020    LDL 59 02/13/2020     CMP " 9/3/2021:  Sodium 145  Potassium 4.8  Chloride 107  CO2 24  BUN 15  Creatinine 0.6  ALT 16  AST 24  Alk phos 84    Lipid Panel 9/3/2021:  Total cholesterol: 132  Tri  HDL: 42  LDL: 69      ECG 12 Lead    Date/Time: 2023 2:35 PM  Performed by: Kingsley Zimmerman MD  Authorized by: Kingsley Zimmerman MD   Comparison: compared with previous ECG from 11/10/2022  Similar to previous ECG  Rhythm: sinus bradycardia        Holter 2022:  · A normal monitor study.  · Underlying heart rhythm is sinus rhythm with an average heart rate of of 57 bpm and a heart rate range of 46 bpm up to 79 bpm  · Patient reported events correlated with underlying sinus rhythm.    Cardiac Catheterization 2022:  · Left main: Normal  · Left anterior descending: Normal proximally there is a long stent in the proximal and mid LAD that is widely patent and really looks fantastic 20% disease in the mid distal LAD and then normal distally the diagonals first diagonal is free of disease a second diagonal is small with a 90% ostial lesion it is covered by the stent not amenable to PCI does not look like it is changed much  · Ramus intermedius:Not present  · Circumflex: 20 to 30% proximal disease the remainder of the vessels normal  · RCA: Is a dominant vessel.  20 to 30% proximal disease 20% mid disease normal distally    Stress MPI 2022:  · Patient complained of 9/10 chest pain with exertion  · Blood pressure demonstrated a hypertensive response to stress.  · A horizontal ST segment depression of 1 mm in the inferolateral leads was noted during stress (II, III, aVF, V5, V6 and V4), beginning at 4 minutes of stress, and returning to baseline after more than 9 minutes of recovery.  · Myocardial perfusion imaging indicates a normal myocardial perfusion study with no evidence of ischemia.  · Left ventricular ejection fraction is hyperdynamic (Calculated EF > 70%). .  · Because of severe chest pain, patient being evaluated further in  "[facility].    Cardiac Catheterization 1/17/2020:  · Severe single-vessel coronary artery disease with a long stenoses involving the proximal to mid LAD at 90%.  There is 40% ostial circumflex stenoses and scattered 20% stenoses in the RCA.  · Normal left ventricular function at 60% with a normal left ventricular filling pressure 11 mmHg.  · Successful revascularization of proximal to mid LAD with placement of overlapping 2.25 x 33 mm and 2.25 x 8 mm Xience drug-eluting stents with the mid and proximal segments postdilated with a noncompliant 2.5 mm balloon to 16 roland.    Mobile Telemetry 1/16/2020:  · A normal monitor study.  · Underlying heart rhythm was sinus with an average rate of 86 bpm no significant arrhythmias noted during her study    Stress MPI 1/7/2020:  · Patient complained of 8/10 chest pressure and \"burning sensation\" with exertion  · Myocardial perfusion imaging indicates a normal myocardial perfusion study with no evidence of ischemia.  · Left ventricular ejection fraction is severely reduced (Calculated EF = 22%).  · The gating does not appear to accurate on this study. Would recommend correlation with echocardiogram.    Carotid Duplex 1/7/2020:  · Distal right internal carotid artery mild stenosis.  · Mid left internal carotid artery mild stenosis.     Assessment:          Diagnosis Plan   1. Coronary artery disease involving native coronary artery of native heart without angina pectoris  ECG 12 Lead      2. Essential hypertension        3. Mixed hyperlipidemia        4. History of CVA (cerebrovascular accident)               Plan:       Ms. Vines is a 77 y.o. woman with past medical history notable for coronary artery disease status post percutaneous intervention, TIAs, hypertension, mixed hyperlipidemia, and chronic lung disease who presents to my office for follow-up.  In general patient is doing well.  Blood pressure is elevated in the office here but actually better at home and at her last office " visit with her primary care doctor.  Good I have her check her blood pressure at home and send me some readings we will see if we need to make any changes in the past we considered changing her metoprolol to carvedilol but obviously she has extensive medication intolerance would hate to make any changes if we do not need to we will follow-up on her repeat blood pressure reading      Coronary artery disease without angina:  · Continue aspirin   · Continue statin  · Continue beta-blocker     · CBC 10/2022 demonstrates normal platelet count    Hypertension:  · Elevated in the office today but better at home she will send us blood pressure readings from home over the next week  · Challenging to control due to intolerance of many medications  · Unable to tolerate amlodipine due to leg swelling  · Had issues with leg cramping on chlorthalidone  · Had bladder leakage on other diuretics including hydrochlorothiazide  · Continue metoprolol succinate but could change to carvedilol as an opportunity to optimize blood pressure  · Consider adding spironolactone  · BMP 7/2022 demonstrates normal sodium, potassium, and creatinine     Mixed hyperlipidemia:  · Patient currently on statin therapy  · Lipid panel 3/2022 demonstrates good LDL control  · CMP 10/2022 demonstrates normal ALT and AST     History of CVA:  · Continue aspirin and statin    Advance Care Planning   ACP discussion was held with the patient during this visit. Patient does not have an advance directive, information provided.       Follow-up:  6 Month      Kingsley Zimmerman MD  [facility]  05/18/23  14:35 EDT

## 2023-06-01 ENCOUNTER — TELEPHONE (OUTPATIENT)
Dept: CARDIOLOGY | Facility: CLINIC | Age: 78
End: 2023-06-01

## 2023-08-23 ENCOUNTER — HOSPITAL ENCOUNTER (OUTPATIENT)
Facility: HOSPITAL | Age: 78
Setting detail: OBSERVATION
Discharge: HOME | End: 2023-08-27
Attending: EMERGENCY MEDICINE | Admitting: INTERNAL MEDICINE
Payer: MEDICARE

## 2023-08-23 ENCOUNTER — APPOINTMENT (OUTPATIENT)
Dept: GENERAL RADIOLOGY | Facility: HOSPITAL | Age: 78
End: 2023-08-23
Payer: MEDICARE

## 2023-08-23 ENCOUNTER — APPOINTMENT (OUTPATIENT)
Dept: CT IMAGING | Facility: HOSPITAL | Age: 78
End: 2023-08-23
Payer: MEDICARE

## 2023-08-23 DIAGNOSIS — I16.0 HYPERTENSIVE URGENCY: Primary | ICD-10-CM

## 2023-08-23 PROBLEM — I25.118 CORONARY ARTERY DISEASE OF NATIVE ARTERY OF NATIVE HEART WITH STABLE ANGINA PECTORIS: Status: ACTIVE | Noted: 2020-01-17

## 2023-08-23 PROBLEM — I16.1 HYPERTENSIVE EMERGENCY: Status: ACTIVE | Noted: 2023-08-23

## 2023-08-23 PROBLEM — Z86.19 HISTORY OF MAI INFECTION: Chronic | Status: ACTIVE | Noted: 2023-08-23

## 2023-08-23 PROBLEM — J45.909 REACTIVE AIRWAY DISEASE WITHOUT COMPLICATION: Status: ACTIVE | Noted: 2021-05-11

## 2023-08-23 PROBLEM — I75.023: Status: ACTIVE | Noted: 2019-05-24

## 2023-08-23 PROBLEM — R09.89 LABILE BLOOD PRESSURE: Chronic | Status: ACTIVE | Noted: 2023-08-23

## 2023-08-23 PROBLEM — T83.9XXA: Status: ACTIVE | Noted: 2023-08-23

## 2023-08-23 PROBLEM — I25.10 CAD (CORONARY ARTERY DISEASE): Status: ACTIVE | Noted: 2020-01-19

## 2023-08-23 PROBLEM — I48.0 PAF (PAROXYSMAL ATRIAL FIBRILLATION): Chronic | Status: ACTIVE | Noted: 2023-08-23

## 2023-08-23 PROBLEM — I87.2 VENOUS REFLUX: Status: ACTIVE | Noted: 2022-07-10

## 2023-08-23 PROBLEM — M81.0 OSTEOPOROSIS: Status: ACTIVE | Noted: 2023-08-23

## 2023-08-23 PROBLEM — R51.9 ACUTE HEADACHE: Status: ACTIVE | Noted: 2023-08-23

## 2023-08-23 LAB
ALBUMIN SERPL-MCNC: 4.5 G/DL (ref 3.5–5.2)
ALBUMIN/GLOB SERPL: 1.7 G/DL
ALP SERPL-CCNC: 72 U/L (ref 39–117)
ALT SERPL W P-5'-P-CCNC: 23 U/L (ref 1–33)
ANION GAP SERPL CALCULATED.3IONS-SCNC: 11 MMOL/L (ref 5–15)
AST SERPL-CCNC: 37 U/L (ref 1–32)
BASOPHILS # BLD AUTO: 0.05 10*3/MM3 (ref 0–0.2)
BASOPHILS NFR BLD AUTO: 0.8 % (ref 0–1.5)
BILIRUB SERPL-MCNC: 0.8 MG/DL (ref 0–1.2)
BUN SERPL-MCNC: 14 MG/DL (ref 8–23)
BUN/CREAT SERPL: 21.2 (ref 7–25)
CALCIUM SPEC-SCNC: 10 MG/DL (ref 8.6–10.5)
CHLORIDE SERPL-SCNC: 106 MMOL/L (ref 98–107)
CO2 SERPL-SCNC: 25 MMOL/L (ref 22–29)
CREAT SERPL-MCNC: 0.66 MG/DL (ref 0.57–1)
CRP SERPL-MCNC: <0.3 MG/DL (ref 0–0.5)
DEPRECATED RDW RBC AUTO: 40.8 FL (ref 37–54)
EGFRCR SERPLBLD CKD-EPI 2021: 91 ML/MIN/1.73
EOSINOPHIL # BLD AUTO: 0.14 10*3/MM3 (ref 0–0.4)
EOSINOPHIL NFR BLD AUTO: 2.1 % (ref 0.3–6.2)
ERYTHROCYTE [DISTWIDTH] IN BLOOD BY AUTOMATED COUNT: 12 % (ref 12.3–15.4)
ERYTHROCYTE [SEDIMENTATION RATE] IN BLOOD: 17 MM/HR (ref 0–30)
GEN 5 2HR TROPONIN T REFLEX: 11 NG/L
GLOBULIN UR ELPH-MCNC: 2.6 GM/DL
GLUCOSE SERPL-MCNC: 98 MG/DL (ref 65–99)
HCT VFR BLD AUTO: 44.9 % (ref 34–46.6)
HGB BLD-MCNC: 14.9 G/DL (ref 12–15.9)
HOLD SPECIMEN: NORMAL
HOLD SPECIMEN: NORMAL
IMM GRANULOCYTES # BLD AUTO: 0.02 10*3/MM3 (ref 0–0.05)
IMM GRANULOCYTES NFR BLD AUTO: 0.3 % (ref 0–0.5)
LYMPHOCYTES # BLD AUTO: 1.81 10*3/MM3 (ref 0.7–3.1)
LYMPHOCYTES NFR BLD AUTO: 27.3 % (ref 19.6–45.3)
MCH RBC QN AUTO: 30.5 PG (ref 26.6–33)
MCHC RBC AUTO-ENTMCNC: 33.2 G/DL (ref 31.5–35.7)
MCV RBC AUTO: 92 FL (ref 79–97)
MONOCYTES # BLD AUTO: 0.39 10*3/MM3 (ref 0.1–0.9)
MONOCYTES NFR BLD AUTO: 5.9 % (ref 5–12)
NEUTROPHILS NFR BLD AUTO: 4.23 10*3/MM3 (ref 1.7–7)
NEUTROPHILS NFR BLD AUTO: 63.6 % (ref 42.7–76)
NRBC BLD AUTO-RTO: 0 /100 WBC (ref 0–0.2)
PLATELET # BLD AUTO: 246 10*3/MM3 (ref 140–450)
PMV BLD AUTO: 10.4 FL (ref 6–12)
POTASSIUM SERPL-SCNC: 4.6 MMOL/L (ref 3.5–5.2)
PROT SERPL-MCNC: 7.1 G/DL (ref 6–8.5)
QT INTERVAL: 414 MS
RBC # BLD AUTO: 4.88 10*6/MM3 (ref 3.77–5.28)
SODIUM SERPL-SCNC: 142 MMOL/L (ref 136–145)
TROPONIN T DELTA: 0 NG/L
TROPONIN T SERPL HS-MCNC: 11 NG/L
WBC NRBC COR # BLD: 6.64 10*3/MM3 (ref 3.4–10.8)
WHOLE BLOOD HOLD COAG: NORMAL
WHOLE BLOOD HOLD SPECIMEN: NORMAL

## 2023-08-23 PROCEDURE — 93005 ELECTROCARDIOGRAM TRACING: CPT

## 2023-08-23 PROCEDURE — G0378 HOSPITAL OBSERVATION PER HR: HCPCS

## 2023-08-23 PROCEDURE — 99284 EMERGENCY DEPT VISIT MOD MDM: CPT

## 2023-08-23 PROCEDURE — 70450 CT HEAD/BRAIN W/O DYE: CPT

## 2023-08-23 PROCEDURE — 84484 ASSAY OF TROPONIN QUANT: CPT | Performed by: NURSE PRACTITIONER

## 2023-08-23 PROCEDURE — 85652 RBC SED RATE AUTOMATED: CPT | Performed by: NURSE PRACTITIONER

## 2023-08-23 PROCEDURE — 93010 ELECTROCARDIOGRAM REPORT: CPT | Performed by: STUDENT IN AN ORGANIZED HEALTH CARE EDUCATION/TRAINING PROGRAM

## 2023-08-23 PROCEDURE — 80053 COMPREHEN METABOLIC PANEL: CPT | Performed by: NURSE PRACTITIONER

## 2023-08-23 PROCEDURE — 85025 COMPLETE CBC W/AUTO DIFF WBC: CPT | Performed by: NURSE PRACTITIONER

## 2023-08-23 PROCEDURE — 71045 X-RAY EXAM CHEST 1 VIEW: CPT

## 2023-08-23 PROCEDURE — 86140 C-REACTIVE PROTEIN: CPT | Performed by: NURSE PRACTITIONER

## 2023-08-23 PROCEDURE — 99285 EMERGENCY DEPT VISIT HI MDM: CPT

## 2023-08-23 PROCEDURE — 36415 COLL VENOUS BLD VENIPUNCTURE: CPT

## 2023-08-23 PROCEDURE — 71046 X-RAY EXAM CHEST 2 VIEWS: CPT

## 2023-08-23 RX ORDER — ASPIRIN 81 MG/1
81 TABLET ORAL DAILY
COMMUNITY

## 2023-08-23 RX ORDER — VALSARTAN 160 MG/1
160 TABLET ORAL NIGHTLY
Status: DISCONTINUED | OUTPATIENT
Start: 2023-08-23 | End: 2023-08-25

## 2023-08-23 RX ORDER — ATORVASTATIN CALCIUM 20 MG/1
40 TABLET, FILM COATED ORAL DAILY
Status: DISCONTINUED | OUTPATIENT
Start: 2023-08-23 | End: 2023-08-23

## 2023-08-23 RX ORDER — CEPHALEXIN 500 MG/1
500 CAPSULE ORAL 4 TIMES DAILY
Status: DISCONTINUED | OUTPATIENT
Start: 2023-08-23 | End: 2023-08-24

## 2023-08-23 RX ORDER — SODIUM CHLORIDE 0.9 % (FLUSH) 0.9 %
10 SYRINGE (ML) INJECTION AS NEEDED
Status: DISCONTINUED | OUTPATIENT
Start: 2023-08-23 | End: 2023-08-23

## 2023-08-23 RX ORDER — CLONIDINE HYDROCHLORIDE 0.1 MG/1
0.1 TABLET ORAL EVERY 8 HOURS PRN
Status: DISCONTINUED | OUTPATIENT
Start: 2023-08-23 | End: 2023-08-23

## 2023-08-23 RX ORDER — ONDANSETRON 2 MG/ML
4 INJECTION INTRAMUSCULAR; INTRAVENOUS EVERY 6 HOURS PRN
Status: DISCONTINUED | OUTPATIENT
Start: 2023-08-23 | End: 2023-08-27 | Stop reason: HOSPADM

## 2023-08-23 RX ORDER — AMLODIPINE BESYLATE 5 MG/1
5 TABLET ORAL
Status: DISCONTINUED | OUTPATIENT
Start: 2023-08-23 | End: 2023-08-23

## 2023-08-23 RX ORDER — ACETAMINOPHEN 325 MG/1
650 TABLET ORAL EVERY 6 HOURS PRN
Status: DISCONTINUED | OUTPATIENT
Start: 2023-08-23 | End: 2023-08-24 | Stop reason: SDUPTHER

## 2023-08-23 RX ORDER — CLONIDINE HYDROCHLORIDE 0.1 MG/1
0.1 TABLET ORAL EVERY 6 HOURS PRN
Status: DISCONTINUED | OUTPATIENT
Start: 2023-08-23 | End: 2023-08-23

## 2023-08-23 RX ORDER — BISACODYL 5 MG/1
5 TABLET, DELAYED RELEASE ORAL DAILY PRN
Status: DISCONTINUED | OUTPATIENT
Start: 2023-08-23 | End: 2023-08-25

## 2023-08-23 RX ORDER — SENNOSIDES 8.6 MG
1300 CAPSULE ORAL EVERY 8 HOURS PRN
COMMUNITY

## 2023-08-23 RX ORDER — AMLODIPINE BESYLATE 5 MG/1
5 TABLET ORAL DAILY
Status: DISCONTINUED | OUTPATIENT
Start: 2023-08-24 | End: 2023-08-24

## 2023-08-23 RX ORDER — ASPIRIN 81 MG/1
81 TABLET ORAL DAILY
Status: DISCONTINUED | OUTPATIENT
Start: 2023-08-23 | End: 2023-08-27 | Stop reason: HOSPADM

## 2023-08-23 RX ORDER — METOPROLOL SUCCINATE 50 MG/1
50 TABLET, EXTENDED RELEASE ORAL EVERY MORNING
Status: DISCONTINUED | OUTPATIENT
Start: 2023-08-24 | End: 2023-08-24

## 2023-08-23 RX ORDER — PANTOPRAZOLE SODIUM 40 MG/1
40 TABLET, DELAYED RELEASE ORAL EVERY MORNING
Status: DISCONTINUED | OUTPATIENT
Start: 2023-08-24 | End: 2023-08-27 | Stop reason: HOSPADM

## 2023-08-23 RX ORDER — BISACODYL 10 MG
10 SUPPOSITORY, RECTAL RECTAL DAILY PRN
Status: DISCONTINUED | OUTPATIENT
Start: 2023-08-23 | End: 2023-08-25

## 2023-08-23 RX ORDER — CALCIUM CARBONATE 500 MG/1
2 TABLET, CHEWABLE ORAL 2 TIMES DAILY PRN
Status: DISCONTINUED | OUTPATIENT
Start: 2023-08-23 | End: 2023-08-27 | Stop reason: HOSPADM

## 2023-08-23 RX ORDER — SODIUM CHLORIDE 0.9 % (FLUSH) 0.9 %
10 SYRINGE (ML) INJECTION AS NEEDED
Status: DISCONTINUED | OUTPATIENT
Start: 2023-08-23 | End: 2023-08-27 | Stop reason: HOSPADM

## 2023-08-23 RX ORDER — ATORVASTATIN CALCIUM 20 MG/1
40 TABLET, FILM COATED ORAL NIGHTLY
Status: DISCONTINUED | OUTPATIENT
Start: 2023-08-24 | End: 2023-08-27 | Stop reason: HOSPADM

## 2023-08-23 RX ORDER — ACETAMINOPHEN 160 MG/5ML
650 SOLUTION ORAL EVERY 4 HOURS PRN
Status: DISCONTINUED | OUTPATIENT
Start: 2023-08-23 | End: 2023-08-27 | Stop reason: HOSPADM

## 2023-08-23 RX ORDER — HYDRALAZINE HYDROCHLORIDE 20 MG/ML
10 INJECTION INTRAMUSCULAR; INTRAVENOUS EVERY 4 HOURS PRN
Status: DISCONTINUED | OUTPATIENT
Start: 2023-08-23 | End: 2023-08-26

## 2023-08-23 RX ORDER — ONDANSETRON 4 MG/1
4 TABLET, FILM COATED ORAL EVERY 6 HOURS PRN
Status: DISCONTINUED | OUTPATIENT
Start: 2023-08-23 | End: 2023-08-27 | Stop reason: HOSPADM

## 2023-08-23 RX ORDER — UREA 10 %
1 LOTION (ML) TOPICAL NIGHTLY PRN
Status: DISCONTINUED | OUTPATIENT
Start: 2023-08-23 | End: 2023-08-27 | Stop reason: HOSPADM

## 2023-08-23 RX ORDER — AMOXICILLIN 250 MG
2 CAPSULE ORAL 2 TIMES DAILY
Status: DISCONTINUED | OUTPATIENT
Start: 2023-08-23 | End: 2023-08-25

## 2023-08-23 RX ORDER — ACETAMINOPHEN 500 MG
1000 TABLET ORAL ONCE
Status: COMPLETED | OUTPATIENT
Start: 2023-08-23 | End: 2023-08-23

## 2023-08-23 RX ORDER — ACETAMINOPHEN 650 MG/1
650 SUPPOSITORY RECTAL EVERY 4 HOURS PRN
Status: DISCONTINUED | OUTPATIENT
Start: 2023-08-23 | End: 2023-08-27 | Stop reason: HOSPADM

## 2023-08-23 RX ORDER — NITROGLYCERIN 0.4 MG/1
0.4 TABLET SUBLINGUAL
Status: DISCONTINUED | OUTPATIENT
Start: 2023-08-23 | End: 2023-08-27 | Stop reason: HOSPADM

## 2023-08-23 RX ORDER — SODIUM CHLORIDE 9 MG/ML
40 INJECTION, SOLUTION INTRAVENOUS AS NEEDED
Status: DISCONTINUED | OUTPATIENT
Start: 2023-08-23 | End: 2023-08-27 | Stop reason: HOSPADM

## 2023-08-23 RX ORDER — POLYETHYLENE GLYCOL 3350 17 G/17G
17 POWDER, FOR SOLUTION ORAL DAILY PRN
Status: DISCONTINUED | OUTPATIENT
Start: 2023-08-23 | End: 2023-08-25

## 2023-08-23 RX ORDER — ACETAMINOPHEN 325 MG/1
650 TABLET ORAL EVERY 4 HOURS PRN
Status: DISCONTINUED | OUTPATIENT
Start: 2023-08-23 | End: 2023-08-27 | Stop reason: HOSPADM

## 2023-08-23 RX ORDER — SODIUM CHLORIDE 0.9 % (FLUSH) 0.9 %
10 SYRINGE (ML) INJECTION EVERY 12 HOURS SCHEDULED
Status: DISCONTINUED | OUTPATIENT
Start: 2023-08-23 | End: 2023-08-27 | Stop reason: HOSPADM

## 2023-08-23 RX ADMIN — CLONIDINE HYDROCHLORIDE 0.1 MG: 0.1 TABLET ORAL at 16:40

## 2023-08-23 RX ADMIN — Medication 10 ML: at 20:20

## 2023-08-23 RX ADMIN — CEPHALEXIN 500 MG: 500 CAPSULE ORAL at 22:22

## 2023-08-23 RX ADMIN — AMLODIPINE BESYLATE 5 MG: 5 TABLET ORAL at 14:08

## 2023-08-23 RX ADMIN — ACETAMINOPHEN 650 MG: 325 TABLET ORAL at 16:20

## 2023-08-23 RX ADMIN — ACETAMINOPHEN 1000 MG: 500 TABLET ORAL at 13:21

## 2023-08-23 RX ADMIN — ATORVASTATIN CALCIUM 40 MG: 20 TABLET, FILM COATED ORAL at 17:57

## 2023-08-23 NOTE — H&P
PCP: Josep Kelley MD    Chief complaint   Chief Complaint   Patient presents with    Hypertension       HPI  Patient is a 77 y.o. female presents with history of bronchiectasis, A-fib, history of stroke who presents to the ER from [facility] office due to systolic blood pressure of 250.  Patient got a bladder stimulator approximately 7 weeks ago and she was following up due to some drainage in her back.  Nurse practitioner wanted patient to do a 10-day course of Keflex and they would reevaluate.  In the ER ESR CRP were all negative white count was normal.  But blood pressure have been in the 200-250 range over 90's.  Her heart rate has been mostly in the 50s.  She is on metoprolol 50 XL and valsartan 160.  She tried Norvasc in the past But she had significant lower extremity edema.   Patient states that there is no rhyme or reason to her blood pressure getting too elevated and then she says it will come down mostly on its own.  She says it can happen at home.  Not necessarily at the doctor's office.  It goes high and then goes low.    Patient states that she knows her blood pressure is up because she normally gets a headache.  Both patient and her  do not necessarily think it is in any relation to anxiety or emotional stress.    Patient received some IV Cardene in the ER and then her blood pressure went to 96/59.  So no drip was started.  Then she got up to the floor and she was 228/98 and 210/75.  With 0.1 of clonidine and she went to systolic 112 and then 96.    PAST MEDICAL HISTORY  Past Medical History:   Diagnosis Date    Abnormal stress test 01/13/2020    Added automatically from request for surgery [id number]    Acid reflux disease     Arthritis     Bell's palsy     Bronchiectasis without complication     Dr Campbell    Cerebellar infarction     Chest pain     CVA (cerebral vascular accident) 05/19/2014    Overview:  Per old records in distant past in internal capsule    Hyperlipidemia      Hypertension     Lung disease     Osteoporosis     PAF (paroxysmal atrial fibrillation)     Skin cancer     Stroke     TIA (transient ischemic attack)     Urinary frequency     wears pads    Urinary tract infection        PAST SURGICAL HISTORY  Past Surgical History:   Procedure Laterality Date    ADENOIDECTOMY      APPENDECTOMY      BREAST FIBROADENOMA SURGERY      BRONCHOSCOPY      CARDIAC CATHETERIZATION N/A 01/17/2020    Procedure: Left Heart Cath;  Surgeon: Kingsley Zimmerman MD;  Location:  [facility];  Service: Cardiovascular    CARDIAC CATHETERIZATION N/A 01/17/2020    Procedure: Coronary angiography;  Surgeon: Kingsley Zimmerman MD;  Location:  [facility];  Service: Cardiovascular    CARDIAC CATHETERIZATION N/A 01/17/2020    Procedure: Left ventriculography;  Surgeon: Kingsley Zimmerman MD;  Location:  [facility];  Service: Cardiovascular    CARDIAC CATHETERIZATION N/A 01/17/2020    Procedure: Percutaneous Coronary Intervention;  Surgeon: Kingsley Zimmerman MD;  Location:  [facility];  Service: Cardiovascular    CARDIAC CATHETERIZATION N/A 01/17/2020    Procedure: Stent GERA coronary;  Surgeon: Kingsley Zimmerman MD;  Location:  [facility];  Service: Cardiovascular    CARDIAC CATHETERIZATION N/A 04/04/2022    Procedure: Coronary angiography;  Surgeon: Kvng Burgos MD;  Location:  [facility];  Service: Cardiovascular;  Laterality: N/A;    CARDIAC CATHETERIZATION N/A 04/04/2022    Procedure: Left heart cath;  Surgeon: Kvng Burgos MD;  Location:  [facility];  Service: Cardiovascular;  Laterality: N/A;    CARDIAC CATHETERIZATION N/A 04/04/2022    Procedure: Left ventriculography;  Surgeon: Kvng Burgos MD;  Location:  [facility];  Service: Cardiovascular;  Laterality: N/A;    COLONOSCOPY      HYSTERECTOMY      INTERSTIM PLACEMENT N/A 7/6/2023    Procedure:  INTERSTIM STAGE 1/2;  Surgeon: Prasanna Alfaro MD;  Location:  [facility];  Service: Urology;  Laterality: N/A;    INTERSTIM PLACEMENT N/A 2023    Procedure: INTERSTIM STAGE 1/2;  Surgeon: Prasanna Alfaro MD;  Location:  [facility];  Service: Urology;  Laterality: N/A;    MASTOID SURGERY      TONSILLECTOMY         FAMILY HISTORY  Family History   Problem Relation Age of Onset    Breast cancer Mother     Heart failure Father     Hypertension Father     Heart disease Father     Aneurysm Daughter     Malig Hyperthermia Neg Hx        SOCIAL HISTORY  Social History     Tobacco Use    Smoking status: Former     Packs/day: 1.50     Years: 30.00     Pack years: 45.00     Types: Cigarettes     Start date:      Quit date:      Years since quittin.6    Smokeless tobacco: Never    Tobacco comments:     caffeine use: 1 CUP COFFEE DAILY    Vaping Use    Vaping Use: Never used   Substance Use Topics    Alcohol use: Not Currently     Comment: quit 42 years ago    Drug use: Not Currently     Comment: 42 years ago       MEDICATIONS:  Medications Prior to Admission   Medication Sig Dispense Refill Last Dose    acetaminophen (TYLENOL) 650 MG 8 hr tablet Take 2 tablets by mouth Every 8 (Eight) Hours As Needed for Mild Pain.       aspirin 81 MG EC tablet Take 1 tablet by mouth Daily.   2023    atorvastatin (LIPITOR) 40 MG tablet TAKE 1 TABLET BY MOUTH DAILY 90 tablet 3     calcium carbonate (OS-JERRELL) 1250 (500 Ca) MG tablet Take 1 tablet by mouth 2 (Two) Times a Day.   2023    metoprolol succinate XL (TOPROL-XL) 50 MG 24 hr tablet TAKE 1 TABLET BY MOUTH DAILY (Patient taking differently: Take 1 tablet by mouth Every Morning.) 90 tablet 3 2023    pantoprazole (PROTONIX) 40 MG EC tablet TAKE 1 TABLET BY MOUTH EVERY MORNING 90 tablet 3 2023    valsartan (DIOVAN) 160 MG tablet Take 1 tablet by mouth Every Night.   2023       Allergies:  Codeine, Contrast dye (echo or unknown  "ct/mr), Ct contrast, Erythromycin, Gadolinium derivatives (mr contrast), Iodine, Lisinopril, Morphine, Moxifloxacin, Sulfa antibiotics, and Pneumococcal vaccines    Review of Systems:  fatigue , HA  Negative for following (except as per HPI):  Constitution: chills, fevers,   Eyes: change of vision, loss of vision and discharge  ENT: ear drainage, ear ringing and facial trauma  Respiratory: cough, pleuritic pain, shortness of air  Cardiovascular: chest pressure, pain, lower extremity edema, palpitations  Gastrointestinal: constipation, diarrhea, nausea, vomiting, pain    Integument: rash and wound  Hematologic / Lymphatic: excessive bleeding and easy bruising  Musculoskeletal: joint pain, joint stiffness, joint swelling and muscle pain  Neurological: , numbness, seizures and tremors  Behavioral / Psych: anxiety, depression and hallucinations         Vital Signs  Temp:  [97 °F (36.1 °C)-97.9 °F (36.6 °C)] 97.9 °F (36.6 °C)  Heart Rate:  [49-75] 50  Resp:  [18-20] 18  BP: (145-241)/() 207/77  Flowsheet Rows      Flowsheet Row First Filed Value   Admission Height 160 cm (63\") Documented at 08/23/2023 1044   Admission Weight 52.2 kg (115 lb) Documented at 08/23/2023 1044           Body mass index is 20.37 kg/m².  95%o2    Physical Exam:  General Appearance:    Alert, cooperative, in no acute distress   Head:    Normocephalic, without obvious abnormality, atraumatic   Eyes:         conjunctivae and sclerae normal, no icterus, PERRLA   ENT:    Ears grossly intact, oral mucosa moist,   Neck:   No adenopathy, supple, trachea midline,        Lungs:     Clear to auscultation,respirations regular, even and                   unlabored    Heart:    Regular rhythm and normal rate,  no murmur, normal S1 and S2,   Abdomen:     Normal bowel sounds, no masses,  soft non-tender, non-distended,    Extremities:   Moves all extremities well, no cyanosis, no edema,             Pulses:   Pulses palpable and equal bilaterally   Skin:   " No bleeding, rash, bruising    Neurologic:    Psych:   Cranial nerves 2 - 12 grossly intact, sensation grossly intact,     Moves all extremities well, equal bilateral strength    Alert and Oriented x 3, Normal Affect    I washed/sanitized my hands before entering the room and immediately upon leaving the room.    LABS:  Admission on 08/23/2023   Component Date Value Ref Range Status    QT Interval 08/23/2023 414  ms Final    Glucose 08/23/2023 98  65 - 99 mg/dL Final    BUN 08/23/2023 14  8 - 23 mg/dL Final    Creatinine 08/23/2023 0.66  0.57 - 1.00 mg/dL Final    Sodium 08/23/2023 142  136 - 145 mmol/L Final    Potassium 08/23/2023 4.6  3.5 - 5.2 mmol/L Final    Specimen hemolyzed.  Results may be affected.    Chloride 08/23/2023 106  98 - 107 mmol/L Final    CO2 08/23/2023 25.0  22.0 - 29.0 mmol/L Final    Calcium 08/23/2023 10.0  8.6 - 10.5 mg/dL Final    Total Protein 08/23/2023 7.1  6.0 - 8.5 g/dL Final    Albumin 08/23/2023 4.5  3.5 - 5.2 g/dL Final    ALT (SGPT) 08/23/2023 23  1 - 33 U/L Final    Specimen hemolyzed.  Results may be affected.    AST (SGOT) 08/23/2023 37 (H)  1 - 32 U/L Final    Specimen hemolyzed.  Results may be affected.    Alkaline Phosphatase 08/23/2023 72  39 - 117 U/L Final    Total Bilirubin 08/23/2023 0.8  0.0 - 1.2 mg/dL Final    Globulin 08/23/2023 2.6  gm/dL Final    A/G Ratio 08/23/2023 1.7  g/dL Final    BUN/Creatinine Ratio 08/23/2023 21.2  7.0 - 25.0 Final    Anion Gap 08/23/2023 11.0  5.0 - 15.0 mmol/L Final    eGFR 08/23/2023 91.0  >60.0 mL/min/1.73 Final    HS Troponin T 08/23/2023 11 (H)  <10 ng/L Final    Specimen hemolyzed.  Results may be affected.    Extra Tube 08/23/2023 Hold for add-ons.   Final    Auto resulted.    Extra Tube 08/23/2023 hold for add-on   Final    Auto resulted    Extra Tube 08/23/2023 Hold for add-ons.   Final    Auto resulted.    Extra Tube 08/23/2023 Hold for add-ons.   Final    Auto resulted    WBC 08/23/2023 6.64  3.40 - 10.80 10*3/mm3 Final     RBC 08/23/2023 4.88  3.77 - 5.28 10*6/mm3 Final    Hemoglobin 08/23/2023 14.9  12.0 - 15.9 g/dL Final    Hematocrit 08/23/2023 44.9  34.0 - 46.6 % Final    MCV 08/23/2023 92.0  79.0 - 97.0 fL Final    MCH 08/23/2023 30.5  26.6 - 33.0 pg Final    MCHC 08/23/2023 33.2  31.5 - 35.7 g/dL Final    RDW 08/23/2023 12.0 (L)  12.3 - 15.4 % Final    RDW-SD 08/23/2023 40.8  37.0 - 54.0 fl Final    MPV 08/23/2023 10.4  6.0 - 12.0 fL Final    Platelets 08/23/2023 246  140 - 450 10*3/mm3 Final    Neutrophil % 08/23/2023 63.6  42.7 - 76.0 % Final    Lymphocyte % 08/23/2023 27.3  19.6 - 45.3 % Final    Monocyte % 08/23/2023 5.9  5.0 - 12.0 % Final    Eosinophil % 08/23/2023 2.1  0.3 - 6.2 % Final    Basophil % 08/23/2023 0.8  0.0 - 1.5 % Final    Immature Grans % 08/23/2023 0.3  0.0 - 0.5 % Final    Neutrophils, Absolute 08/23/2023 4.23  1.70 - 7.00 10*3/mm3 Final    Lymphocytes, Absolute 08/23/2023 1.81  0.70 - 3.10 10*3/mm3 Final    Monocytes, Absolute 08/23/2023 0.39  0.10 - 0.90 10*3/mm3 Final    Eosinophils, Absolute 08/23/2023 0.14  0.00 - 0.40 10*3/mm3 Final    Basophils, Absolute 08/23/2023 0.05  0.00 - 0.20 10*3/mm3 Final    Immature Grans, Absolute 08/23/2023 0.02  0.00 - 0.05 10*3/mm3 Final    nRBC 08/23/2023 0.0  0.0 - 0.2 /100 WBC Final    Sed Rate 08/23/2023 17  0 - 30 mm/hr Final    C-Reactive Protein 08/23/2023 <0.30  0.00 - 0.50 mg/dL Final    HS Troponin T 08/23/2023 11 (H)  <10 ng/L Final    Specimen hemolyzed.  Results may be affected.    Troponin T Delta 08/23/2023 0  >=-4 - <+4 ng/L Final         DIAGNOSTICS:  XR Chest 2 View    Result Date: 8/23/2023  1. Mild subtle hazy opacity within the anterior aspect of the lingula. This may represent prominence of the epicardial fat though it is difficult to exclude a subtle infiltrate in this region. Lungs are otherwise clear. 2. Osteopenia. Mild compression deformities within the thoracic spine are most likely chronic.  This report was finalized on 8/23/2023 2:29 PM  by Dr. Álvaro Julian M.D.      CT Head Without Contrast    Result Date: 8/23/2023  1. No acute intracranial abnormality is identified with no significant change when compared to prior head CT [facility] on 07/16/2022. 2. There is mild small vessel disease in the cerebral white matter and calcified plaques in the intracranial segment of the distal right vertebral artery and cavernous segments of the internal carotid arteries bilaterally. 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 The remainder of the head CT is within normal limits.  Radiation dose reduction techniques were utilized, including automated exposure control and exposure modulation based on body size.   This report was finalized on 8/23/2023 12:00 PM by Dr. Yamil Singh M.D.      XR Chest 1 View    Result Date: 8/23/2023  Subtle hazy opacity along the left lower cardiac border. This could represent prominence of epicardial fat though a subtle hazy infiltrate is also in the differential diagnosis. There are no previous studies for comparison. Recommend follow-up with PA and lateral chest.  This report was finalized on 8/23/2023 10:26 AM by Dr. Álvaro Julian M.D.            Results Review:   I reviewed the patient's new clinical results.  Discussed with ER physician  Old records reviewed / Medical Decision Making High Complexity  I personally viewed and interpreted the patient's EKG/Telemetry data- sinus rhythm      ASSESSMENT AND PLAN    Mixed incontinence    Bronchiectasis    Hyperlipidemia    History of CVA (cerebrovascular accident)    S/P angioplasty with stent    Hypertensive emergency    Acute headache    Complication of urinary electronic stimulator device, initial encounter    History of ANTONY infection    Hypertensive emergency/labile blood pressure with headache  -Status post IV antihypertensives in the ER with subsequent  hypotension  -Differential diagnosis is autonomic dysfunction/[place] reflex failure versus possible pheochromocytoma (but patient has no tachycardia), or a pseudochromocytoma (but patient does not have anxiety)  -Patient had some adverse effects of lower extremity edema severe with Norvasc.  She did not like the way the feeling after the clonidine.  Patient has been in the hospital multiple times for this and does have a history of a stroke.  Could consider labile hypertension work-up with nephrology as an outpatient and could do as needed hydralazine orally at home.   -Or could consider alpha blockade  (to go along with her current beta-blockade )with small dose of doxazosin    Bradycardia  -Heart rate 49-53.  Cannot go up on her metoprolol 50 XL.  May need to do lower dose of metoprolol and increase the valsartan      Complication of urinary electronic stimulator device, initial encounter/bladder stimulator placed 7 weeks ago/for dehiscence versus seroma versus possible infection  -We will continue on Keflex per urology's request  -Consult infectious disease  -White count okay, ESR and CRP okay    Abnormal chest x-ray  -Repeat but do a PA and lateral in the morning    Paroxysmal A-fib  -Patient is on aspirin  -Dr. Zimmerman      Chronic medical conditions being monitored- stable, continue medical management  - Mixed incontinence    Bronchiectasis    Hyperlipidemia    History of CVA (cerebrovascular accident)  -  S/P angioplasty with stent      +DVT proph:    scd  + CODE STATUS: Full       I discussed the patient's findings and my recommendations with the patient and/or family.  Please reference all orders placed.    Pili Vargas MD  08/23/23  17:19 EDT      This document is intended for medical expert use only. Reading of this document by patients and/or patient's family without participating in medical staff guidance may result in misinterpretation and unintended morbidity. Any interpretation of such data is  the responsibility of the patient and/or family member responsible for the patient in concert with their primary or specialist providers, and NOT to be left for sources of online searches such as [employer], [employer] or similar queries. Relying on these approaches to knowledge may result in misinterpretation, misguided goals of care and even death should patients or family members try recommendations outside of the realm of professional medical care in a supervised way.    Dictated utilizing Voice dictation:  Parts of this note may be an electronic transcription/translation of spoke language to printed text using Dragon dictation system.

## 2023-08-23 NOTE — PROGRESS NOTES
Clinical Pharmacy Services: Medication History    Della Vines is a 77 y.o. female presenting to [facility] for   Chief Complaint   Patient presents with    Hypertension       She  has a past medical history of Abnormal stress test (01/13/2020), Acid reflux disease, Arthritis, Bell's palsy, Bronchiectasis without complication, Cerebellar infarction, Chest pain, CVA (cerebral vascular accident) (05/19/2014), Hyperlipidemia, Hypertension, Lung disease, Osteoporosis, PAF (paroxysmal atrial fibrillation), Skin cancer, Stroke, TIA (transient ischemic attack), Urinary frequency, and Urinary tract infection.    Allergies as of 08/23/2023 - Reviewed 08/23/2023   Allergen Reaction Noted    Codeine Hives and Nausea Only 06/25/2012    Contrast dye (echo or unknown ct/mr) Hives 06/22/2023    Ct contrast Hives 06/22/2023    Erythromycin Diarrhea 06/25/2012    Gadolinium derivatives (mr contrast) Hives 06/22/2023    Iodine Hives 09/22/2014    Lisinopril Other (See Comments) 06/25/2012    Morphine Itching and Delirium 01/30/2020    Moxifloxacin  03/07/2017    Sulfa antibiotics Hives and Nausea Only 06/25/2012    Pneumococcal vaccines Swelling and Rash 01/30/2020       Medication information was obtained from: Patient   Pharmacy and Phone Number:     Prior to Admission Medications       Prescriptions Last Dose Informant Patient Reported? Taking?    acetaminophen (TYLENOL) 650 MG 8 hr tablet  Self Yes Yes    Take 2 tablets by mouth Every 8 (Eight) Hours As Needed for Mild Pain.    aspirin 81 MG EC tablet 8/23/2023 Self Yes Yes    Take 1 tablet by mouth Daily.    atorvastatin (LIPITOR) 40 MG tablet  Self No Yes    TAKE 1 TABLET BY MOUTH DAILY    calcium carbonate (OS-JERRELL) 1250 (500 Ca) MG tablet 8/23/2023 Self Yes Yes    Take 1 tablet by mouth 2 (Two) Times a Day.    metoprolol succinate XL (TOPROL-XL) 50 MG 24 hr tablet 8/23/2023 Self No Yes    TAKE 1 TABLET BY MOUTH DAILY    Patient taking differently:  Take 1  tablet by mouth Every Morning.    pantoprazole (PROTONIX) 40 MG EC tablet 8/23/2023 Self No Yes    TAKE 1 TABLET BY MOUTH EVERY MORNING    valsartan (DIOVAN) 160 MG tablet 8/22/2023 Self Yes Yes    Take 1 tablet by mouth Every Night.    cephalexin (Keflex) 500 MG capsule   No No    Take 1 capsule by mouth 2 (Two) Times a Day.    Cholecalciferol 2000 units tablet  Self Yes No    Take 4,000 Int'l Units by mouth Daily.    fluorouracil (EFUDEX) 5 % cream   Yes No    Apply 1 application  topically to the appropriate area as directed 2 (Two) Times a Day.    HYDROcodone-acetaminophen (Norco) 5-325 MG per tablet   No No    Take 1 tablet by mouth Every 6 (Six) Hours As Needed for Moderate Pain.    nitroglycerin (NITROSTAT) 0.4 MG SL tablet   No No    Place 1 tablet under the tongue Every 5 (Five) Minutes As Needed for Chest Pain. Place one tablet under tongue as needed for chest pain.              Medication notes: Patient is taking atorvastatin daily but was unsure if she took her medication this morning or not. Also pt is prescribed keflex but has not picked up medication from pharmacy yet.     This medication list is complete to the best of my knowledge as of 8/23/2023    Please call if questions.    Deepthi Huffman  Medication History Technician   [phone]    8/23/2023 15:04 EDT

## 2023-08-23 NOTE — PLAN OF CARE
Goal Outcome Evaluation:  Plan of Care Reviewed With: patient        Progress: no change  Outcome Evaluation: VSS; complained of headache; tylenol given; bp was 200s/70s on the floor; gave a clonidine; bp doing better; monitoring bp q 30min using automatic bp cuff; safety maintained; continue to monitor

## 2023-08-23 NOTE — ED NOTES
Nursing report ED to floor  Della Vines  77 y.o.  female    HPI :   Chief Complaint   Patient presents with    Hypertension       Admitting doctor:   Pili Vargas MD    Admitting diagnosis:   The encounter diagnosis was Hypertensive urgency.    Code status:   Current Code Status       Date Active Code Status Order ID Comments User Context       Prior            Allergies:   Codeine, Contrast dye (echo or unknown ct/mr), Ct contrast, Erythromycin, Gadolinium derivatives (mr contrast), Iodine, Lisinopril, Morphine, Moxifloxacin, Sulfa antibiotics, and Pneumococcal vaccines    Isolation:   No active isolations    Intake and Output  No intake or output data in the 24 hours ending 08/23/23 1453    Weight:       08/23/23  1044   Weight: 52.2 kg (115 lb)       Most recent vitals:   Vitals:    08/23/23 1354 08/23/23 1355 08/23/23 1401 08/23/23 1413   BP: (!) 214/77 (!) 218/86 (!) 219/91    BP Location:       Patient Position:       Pulse: 53 54 55 50   Resp:       Temp:       TempSrc:       SpO2: 95% 96% 95% 96%   Weight:       Height:           Active LDAs/IV Access:   Lines, Drains & Airways       Active LDAs       Name Placement date Placement time Site Days    Peripheral IV 08/23/23 1004 Anterior;Right Forearm 08/23/23  1004  Forearm  less than 1                    Labs (abnormal labs have a star):   Labs Reviewed   COMPREHENSIVE METABOLIC PANEL - Abnormal; Notable for the following components:       Result Value    AST (SGOT) 37 (*)     All other components within normal limits    Narrative:     GFR Normal >60  Chronic Kidney Disease <60  Kidney Failure <15    The GFR formula is only valid for adults with stable renal function between ages 18 and 70.   TROPONIN - Abnormal; Notable for the following components:    HS Troponin T 11 (*)     All other components within normal limits    Narrative:     High Sensitive Troponin T Reference Range:  <10.0 ng/L- Negative Female for AMI  <15.0 ng/L- Negative Male for  AMI  >=10 - Abnormal Female indicating possible myocardial injury.  >=15 - Abnormal Male indicating possible myocardial injury.   Clinicians would have to utilize clinical acumen, EKG, Troponin, and serial changes to determine if it is an Acute Myocardial Infarction or myocardial injury due to an underlying chronic condition.        CBC WITH AUTO DIFFERENTIAL - Abnormal; Notable for the following components:    RDW 12.0 (*)     All other components within normal limits   HIGH SENSITIVITIY TROPONIN T 2HR - Abnormal; Notable for the following components:    HS Troponin T 11 (*)     All other components within normal limits    Narrative:     High Sensitive Troponin T Reference Range:  <10.0 ng/L- Negative Female for AMI  <15.0 ng/L- Negative Male for AMI  >=10 - Abnormal Female indicating possible myocardial injury.  >=15 - Abnormal Male indicating possible myocardial injury.   Clinicians would have to utilize clinical acumen, EKG, Troponin, and serial changes to determine if it is an Acute Myocardial Infarction or myocardial injury due to an underlying chronic condition.        SEDIMENTATION RATE - Normal   C-REACTIVE PROTEIN - Normal   RAINBOW DRAW    Narrative:     The following orders were created for panel order [place] Draw.  Procedure                               Abnormality         Status                     ---------                               -----------         ------                     Green Top (Gel)[[id number]]                                  Final result               Lavender Top[[id number]]                                     Final result               Gold Top - SST[[id number]]                                   Final result               Light Blue Top[[id number]]                                   Final result                 Please view results for these tests on the individual orders.   CBC AND DIFFERENTIAL    Narrative:     The following orders were created for panel order CBC &  Differential.  Procedure                               Abnormality         Status                     ---------                               -----------         ------                     CBC Auto Differential[[id number]]        Abnormal            Final result                 Please view results for these tests on the individual orders.   GREEN TOP   LAVENDER TOP   GOLD TOP - SST   LIGHT BLUE TOP       EKG:   ECG 12 Lead Bradycardia   Final Result   HEART RATE= 52  bpm   RR Interval= 1154  ms   IL Interval= 147  ms   P Horizontal Axis= 12  deg   P Front Axis= 62  deg   QRSD Interval= 79  ms   QT Interval= 414  ms   QRS Axis= 41  deg   T Wave Axis= 31  deg   - OTHERWISE NORMAL ECG -   Sinus rhythm   Atrial premature complex   When compared with ECG of 16-Jul-2022 2:13:26,   Previous EKG not interpretable due to artifact   Electronically Signed By: Mario Torres ([facility]) 23-Aug-2023 14:34:18   Date and Time of Study: 2023-08-23 09:16:46          Meds given in ED:   Medications   sodium chloride 0.9 % flush 10 mL (has no administration in time range)   amLODIPine (NORVASC) tablet 5 mg (5 mg Oral Given 8/23/23 1408)   acetaminophen (TYLENOL) tablet 1,000 mg (1,000 mg Oral Given 8/23/23 1321)       Imaging results:  XR Chest 2 View    Result Date: 8/23/2023  1. Mild subtle hazy opacity within the anterior aspect of the lingula. This may represent prominence of the epicardial fat though it is difficult to exclude a subtle infiltrate in this region. Lungs are otherwise clear. 2. Osteopenia. Mild compression deformities within the thoracic spine are most likely chronic.  This report was finalized on 8/23/2023 2:29 PM by Dr. Álvaro Julian M.D.      CT Head Without Contrast    Result Date: 8/23/2023  1. No acute intracranial abnormality is identified with no significant change when compared to prior head CT from [facility] on 07/16/2022. 2. There is mild small vessel disease in the cerebral white matter and  calcified plaques in the intracranial segment of the distal right vertebral artery and cavernous segments of the internal carotid arteries bilaterally. 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 The remainder of the head CT is within normal limits.  Radiation dose reduction techniques were utilized, including automated exposure control and exposure modulation based on body size.   This report was finalized on 2023 12:00 PM by Dr. Yamil Singh M.D.      XR Chest 1 View    Result Date: 2023  Subtle hazy opacity along the left lower cardiac border. This could represent prominence of epicardial fat though a subtle hazy infiltrate is also in the differential diagnosis. There are no previous studies for comparison. Recommend follow-up with PA and lateral chest.  This report was finalized on 2023 10:26 AM by Dr. Álvaro Julian M.D.       Ambulatory status: partial assist    Social issues:   Social History     Socioeconomic History    Marital status:      Spouse name: Tyrone Vines    Number of children: 1    Years of education: 14   Tobacco Use    Smoking status: Former     Packs/day: 1.50     Years: 30.00     Pack years: 45.00     Types: Cigarettes     Start date:      Quit date:      Years since quittin.6    Smokeless tobacco: Never    Tobacco comments:     caffeine use: 1 CUP COFFEE DAILY    Vaping Use    Vaping Use: Never used   Substance and Sexual Activity    Alcohol use: Not Currently     Comment: quit 42 years ago    Drug use: Not Currently     Comment: 42 years ago    Sexual activity: Defer       NIH Stroke Scale:       Megha Hoffman RN  23 14:53 EDT

## 2023-08-23 NOTE — ED TRIAGE NOTES
Pt sent to ED from [facility] due to hypertension. Pt stated he BP was 220 systolic. Pt states a mild HA that started this morning. Pt denies blurred or double visio at this time.     PT's BP reading in triage reads +++/113.    Pt states she has a hx of vertigo but does not take  medication at this time. Pt was observed unsteady on her feet from the sitting position.

## 2023-08-23 NOTE — ED PROVIDER NOTES
EMERGENCY DEPARTMENT ENCOUNTER    Room Number:  03/03  PCP: Josep Kelley MD  Historian: patient      HPI:  Chief Complaint: htn  A complete HPI/ROS/PMH/PSH/SH/FH are unobtainable due to: nothing  Context: Della Vines is a pleasant afebrile ambulatory 77 y.o. occasion female who presents to the ED c/o hypertension and headache.    States she went to see her urology team today and was advised that she might have an infection from where her bladder stimulator surgery was about a month ago.  Was told at that time that her blood pressure was very high.  Patient states that she can tell when her BP runs high that she starts getting a little bit of a headache.  She denies any chest pain, shortness of breath, vision changes or focal weakness.    Reports compliance with her antihypertensive regimen, states she follows with Dr. Zimmerman as her cardiologist.        PAST MEDICAL HISTORY  Active Ambulatory Problems     Diagnosis Date Noted    Visit for gynecologic examination 11/09/2017    Mixed incontinence 11/09/2017    Atrophic vaginitis 11/09/2017    Age related osteoporosis 11/09/2017    Bronchiectasis 05/19/2014    Essential hypertension 04/28/2016    GERD (gastroesophageal reflux disease) 04/03/2014    Hyperlipidemia 12/30/2019    ANTONY (mycobacterium avium-intracellulare) 11/22/2016    Coronary artery disease involving native coronary artery of native heart without angina pectoris 01/17/2020    History of CVA (cerebrovascular accident) 09/20/2021    S/P angioplasty with stent 03/23/2022     Resolved Ambulatory Problems     Diagnosis Date Noted    CVA (cerebral vascular accident) 05/19/2014    Abnormal stress test 01/13/2020     Past Medical History:   Diagnosis Date    Acid reflux disease     Arthritis     Bell's palsy     Bronchiectasis without complication     Cerebellar infarction     Chest pain     Hypertension     Lung disease     Osteoporosis     PAF (paroxysmal atrial fibrillation)     Skin cancer     Stroke      TIA (transient ischemic attack)     Urinary frequency     Urinary tract infection          PAST SURGICAL HISTORY  Past Surgical History:   Procedure Laterality Date    ADENOIDECTOMY      APPENDECTOMY      BREAST FIBROADENOMA SURGERY      BRONCHOSCOPY      CARDIAC CATHETERIZATION N/A 01/17/2020    Procedure: Left Heart Cath;  Surgeon: Kingsley Zimmerman MD;  Location:  [facility]AS[facility];  Service: Cardiovascular    CARDIAC CATHETERIZATION N/A 01/17/2020    Procedure: Coronary angiography;  Surgeon: Kingsley Zimmerman MD;  Location:  [facility];  Service: Cardiovascular    CARDIAC CATHETERIZATION N/A 01/17/2020    Procedure: Left ventriculography;  Surgeon: Kingsley Zimmerman MD;  Location:  [facility];  Service: Cardiovascular    CARDIAC CATHETERIZATION N/A 01/17/2020    Procedure: Percutaneous Coronary Intervention;  Surgeon: Kingsley Zimmerman MD;  Location:  [facility];  Service: Cardiovascular    CARDIAC CATHETERIZATION N/A 01/17/2020    Procedure: Stent GERA coronary;  Surgeon: Kingsley Zimmerman MD;  [facility];  Service: Cardiovascular    CARDIAC CATHETERIZATION N/A 04/04/2022    Procedure: Coronary angiography;  Surgeon: Kvng Burgos MD;  Location:  [facility];  Service: Cardiovascular;  Laterality: N/A;    CARDIAC CATHETERIZATION N/A 04/04/2022    Procedure: Left heart cath;  Surgeon: Kvng Burgos MD;  [facility];  Service: Cardiovascular;  Laterality: N/A;    CARDIAC CATHETERIZATION N/A 04/04/2022    Procedure: Left ventriculography;  Surgeon: Kvng Burgos MD;  [facility];  Service: Cardiovascular;  Laterality: N/A;    COLONOSCOPY      HYSTERECTOMY      INTERSTIM PLACEMENT N/A 7/6/2023    Procedure: INTERSTIM STAGE 1/2;  Surgeon: Prasanna Alfaro MD;  Location: [facility] MAIN OR;  Service: Urology;  Laterality: N/A;     INTERSTIM PLACEMENT N/A 2023    Procedure: INTERSTIM STAGE 1/2;  Surgeon: Prasanna Alfaro MD;  Location: [facility] MAIN OR;  Service: Urology;  Laterality: N/A;    MASTOID SURGERY      TONSILLECTOMY           FAMILY HISTORY  Family History   Problem Relation Age of Onset    Breast cancer Mother     Heart failure Father     Hypertension Father     Heart disease Father     Aneurysm Daughter     Malig Hyperthermia Neg Hx          SOCIAL HISTORY  Social History     Socioeconomic History    Marital status:      Spouse name: Tyrone Vines    Number of children: Jerald    Years of education: 14   Tobacco Use    Smoking status: Former     Packs/day: 1.50     Years: 30.00     Pack years: 45.00     Types: Cigarettes     Start date:      Quit date:      Years since quittin.6    Smokeless tobacco: Never    Tobacco comments:     caffeine use: 1 CUP COFFEE DAILY    Vaping Use    Vaping Use: Never used   Substance and Sexual Activity    Alcohol use: Not Currently     Comment: quit 42 years ago    Drug use: Not Currently     Comment: 42 years ago    Sexual activity: Defer         ALLERGIES  Codeine, Contrast dye (echo or unknown ct/mr), Ct contrast, Erythromycin, Gadolinium derivatives (mr contrast), Iodine, Lisinopril, Morphine, Moxifloxacin, Sulfa antibiotics, and Pneumococcal vaccines        REVIEW OF SYSTEMS  Review of Systems   Skin:  Positive for wound.   Neurological:  Positive for headaches.          PHYSICAL EXAM  ED Triage Vitals   Temp Heart Rate Resp BP SpO2   23 0906 23 0906 23 0924 23 0929 23 0906   97 °F (36.1 °C) 53 20 (!) 230/108 97 %      Temp src Heart Rate Source Patient Position BP Location FiO2 (%)   23 0906 23 0906 -- 23 09 --   Tympanic Monitor  Left arm        Physical Exam      GENERAL: Alert and oriented x4, no acute distress  HENT: nares patent mucous membranes are moist and intact  EYES: no scleral icterus no injection  CV: regular  rhythm, normal rate no murmur or peripheral edema  RESPIRATORY: normal effort clear to all fields bilaterally, able to speak in full sentences without hint of distress  ABDOMEN: soft and nontender diffusely, no rebound or guarding  MUSCULOSKELETAL: no deformity normal active range of motion all extremities  Neuro: Alert and oriented x3, extraocular motion intact, pupils are equal and round reactive to light, cranial nerves II through XII are grossly intact, normal speech, moves all extremities well, 5 out of 5 strength all 4 extremities, sensation intact light touch all 4 extremities, no ataxia.  PSYCH: Anxious, cooperative  SKIN: warm, dry, right posterior lower flank/soft tissue over low back shows surgical incision consistent with patient reported area of bladder stimulator, there is a slight area of dehiscence with serous fluid present and mild tenderness to palpation overlying it without any erythema, warmth or crepitus appreciated    Vital signs and nursing notes reviewed.          LAB RESULTS  Recent Results (from the past 24 hour(s))   ECG 12 Lead Bradycardia    Collection Time: 08/23/23  9:16 AM   Result Value Ref Range    QT Interval 414 ms   Green Top (Gel)    Collection Time: 08/23/23 10:03 AM   Result Value Ref Range    Extra Tube Hold for add-ons.    Lavender Top    Collection Time: 08/23/23 10:03 AM   Result Value Ref Range    Extra Tube hold for add-on    Gold Top - SST    Collection Time: 08/23/23 10:03 AM   Result Value Ref Range    Extra Tube Hold for add-ons.    Light Blue Top    Collection Time: 08/23/23 10:03 AM   Result Value Ref Range    Extra Tube Hold for add-ons.    CBC Auto Differential    Collection Time: 08/23/23 10:03 AM    Specimen: Blood   Result Value Ref Range    WBC 6.64 3.40 - 10.80 10*3/mm3    RBC 4.88 3.77 - 5.28 10*6/mm3    Hemoglobin 14.9 12.0 - 15.9 g/dL    Hematocrit 44.9 34.0 - 46.6 %    MCV 92.0 79.0 - 97.0 fL    MCH 30.5 26.6 - 33.0 pg    MCHC 33.2 31.5 - 35.7 g/dL     RDW 12.0 (L) 12.3 - 15.4 %    RDW-SD 40.8 37.0 - 54.0 fl    MPV 10.4 6.0 - 12.0 fL    Platelets 246 140 - 450 10*3/mm3    Neutrophil % 63.6 42.7 - 76.0 %    Lymphocyte % 27.3 19.6 - 45.3 %    Monocyte % 5.9 5.0 - 12.0 %    Eosinophil % 2.1 0.3 - 6.2 %    Basophil % 0.8 0.0 - 1.5 %    Immature Grans % 0.3 0.0 - 0.5 %    Neutrophils, Absolute 4.23 1.70 - 7.00 10*3/mm3    Lymphocytes, Absolute 1.81 0.70 - 3.10 10*3/mm3    Monocytes, Absolute 0.39 0.10 - 0.90 10*3/mm3    Eosinophils, Absolute 0.14 0.00 - 0.40 10*3/mm3    Basophils, Absolute 0.05 0.00 - 0.20 10*3/mm3    Immature Grans, Absolute 0.02 0.00 - 0.05 10*3/mm3    nRBC 0.0 0.0 - 0.2 /100 WBC   Sedimentation Rate    Collection Time: 08/23/23 10:03 AM    Specimen: Blood   Result Value Ref Range    Sed Rate 17 0 - 30 mm/hr   C-reactive Protein    Collection Time: 08/23/23 10:03 AM    Specimen: Blood   Result Value Ref Range    C-Reactive Protein <0.30 0.00 - 0.50 mg/dL   Comprehensive Metabolic Panel    Collection Time: 08/23/23 11:12 AM    Specimen: Blood   Result Value Ref Range    Glucose 98 65 - 99 mg/dL    BUN 14 8 - 23 mg/dL    Creatinine 0.66 0.57 - 1.00 mg/dL    Sodium 142 136 - 145 mmol/L    Potassium 4.6 3.5 - 5.2 mmol/L    Chloride 106 98 - 107 mmol/L    CO2 25.0 22.0 - 29.0 mmol/L    Calcium 10.0 8.6 - 10.5 mg/dL    Total Protein 7.1 6.0 - 8.5 g/dL    Albumin 4.5 3.5 - 5.2 g/dL    ALT (SGPT) 23 1 - 33 U/L    AST (SGOT) 37 (H) 1 - 32 U/L    Alkaline Phosphatase 72 39 - 117 U/L    Total Bilirubin 0.8 0.0 - 1.2 mg/dL    Globulin 2.6 gm/dL    A/G Ratio 1.7 g/dL    BUN/Creatinine Ratio 21.2 7.0 - 25.0    Anion Gap 11.0 5.0 - 15.0 mmol/L    eGFR 91.0 >60.0 mL/min/1.73   High Sensitivity Troponin T    Collection Time: 08/23/23 11:12 AM    Specimen: Blood   Result Value Ref Range    HS Troponin T 11 (H) <10 ng/L   High Sensitivity Troponin T 2Hr    Collection Time: 08/23/23  1:16 PM    Specimen: Blood   Result Value Ref Range    HS Troponin T 11 (H) <10 ng/L     Troponin T Delta 0 >=-4 - <+4 ng/L       Ordered the above labs and reviewed the results.        RADIOLOGY  XR Chest 2 View    Result Date: 8/23/2023  CHEST: 2 VIEWS  HISTORY: Hypertension, headaches.  COMPARISON: AP chest 08/23/2023.  FINDINGS: Heart size is within normal limits. The thoracic aorta is tortuous. AP chest same date demonstrated subtle hazy opacity at the left lung base and this does not appear changed. On the lateral view, this persists and could represent prominence of the epicardial fat though a subtle infiltrate is also in the differential diagnosis. Lungs otherwise appear clear. The bones are osteopenic and there are mild compression deformities within the thoracic spine.      1. Mild subtle hazy opacity within the anterior aspect of the lingula. This may represent prominence of the epicardial fat though it is difficult to exclude a subtle infiltrate in this region. Lungs are otherwise clear. 2. Osteopenia. Mild compression deformities within the thoracic spine are most likely chronic.  This report was finalized on 8/23/2023 2:29 PM by Dr. Álvaro Julian M.D.      CT Head Without Contrast    Result Date: 8/23/2023  EMERGENCY CT SCAN OF THE HEAD WITHOUT CONTRAST ON 08/23/2023  CLINICAL HISTORY:  Headache and hypertension.  TECHNIQUE: Spiral CT images were obtained from the base of the skull to the vertex without intravenous contrast. The images were reformatted and submitted in 3 mm thick axial, sagittal and coronal CT sections with brain algorithm.  This is correlated to a prior head CT from [facility] on 07/16/2022.  FINDINGS: There is some scattered patchy areas of low density in the periventricular subcortical white matter of the cerebral hemispheres consistent with mild small vessel disease. The remainder of the brain parenchyma is normal in attenuation. The ventricles are normal in size. I see no focal mass effect. There is no midline shift. No extra-axial fluid collections  are identified. There is no evidence of acute intracranial hemorrhage. The calvarium and the skull base are normal in appearance. There has been a previous left mastoidectomy. The left mastoid bowl is clear.  The right mastoid and middle ear cavity are clear. Calcified plaques are present in the intracranial segment of the distal right vertebral artery and cavernous segments of the internal carotid arteries bilaterally.  There is some osteoarthritic change in the temporomandibular joints with marginal spurring off the mandibular heads bilaterally, right greater than left.      1. No acute intracranial abnormality is identified with no significant change when compared to prior head CT [facility] on 07/16/2022. 2. There is mild small vessel disease in the cerebral white matter and calcified plaques in the intracranial segment of the distal right vertebral artery and cavernous segments of the internal carotid arteries bilaterally. 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 The remainder of the head CT is within normal limits.  Radiation dose reduction techniques were utilized, including automated exposure control and exposure modulation based on body size.   This report was finalized on 8/23/2023 12:00 PM by Dr. Yamil Singh M.D.      XR Chest 1 View    Result Date: 8/23/2023  CHEST SINGLE VIEW  HISTORY: Chest pain. History of hypertension.  COMPARISON: None.  FINDINGS: Heart size is within normal limits. There is subtle hazy opacity within the left lung base laterally. This may be in part related to prominence of the epicardial fat though a subtle hazy infiltrate is in the differential diagnosis. The right lung appears clear. There is no evidence for perihilar edema or definite effusion. Aortic vascular calcifications are present.      Subtle hazy opacity along the left lower cardiac border. This could  represent prominence of epicardial fat though a subtle hazy infiltrate is also in the differential diagnosis. There are no previous studies for comparison. Recommend follow-up with PA and lateral chest.  This report was finalized on 8/23/2023 10:26 AM by Dr. Álvaro Julian M.D.       Ordered the above noted radiological studies. Reviewed by me in PACS.            PROCEDURES  Critical Care  Performed by: Laurence Sung APRN  Authorized by: Pili Vargas MD     Critical care provider statement:     Critical care time (minutes):  55    Critical care time was exclusive of:  Separately billable procedures and treating other patients    Critical care was necessary to treat or prevent imminent or life-threatening deterioration of the following conditions:  Cardiac failure, renal failure, circulatory failure and CNS failure or compromise    Critical care was time spent personally by me on the following activities:  Blood draw for specimens, development of treatment plan with patient or surrogate, discussions with consultants, evaluation of patient's response to treatment, examination of patient, interpretation of cardiac output measurements, ordering and performing treatments and interventions, ordering and review of laboratory studies, ordering and review of radiographic studies, pulse oximetry, re-evaluation of patient's condition and review of old charts    Care discussed with: admitting provider    Comments:      Initiating cardene gtt and discontinuation of med          MEDICATIONS GIVEN IN ER  Medications   sodium chloride 0.9 % flush 10 mL (has no administration in time range)   niCARdipine (CARDENE) 25 mg in 250 mL NS infusion kit (has no administration in time range)                   MEDICAL DECISION MAKING, PROGRESS, and CONSULTS    All labs have been independently reviewed by me.  All radiology studies have been reviewed by me and I have also reviewed the radiology report.   EKG's independently viewed  and interpreted by me.  Discussion below represents my analysis of pertinent findings related to patient's condition, differential diagnosis, treatment plan and final disposition.      Additional sources:  - Discussed/ obtained information from independent historians:  hx obtained from pt    - External (non-ED) record review: 7/6/23 dr. Alfaro seen for frequency of urination, s/p interstim stage 1/2 surgery    - Chronic or social conditions impacting care: pt reports good social support    - Shared decision making:  pt agreeable to admission      Orders placed during this visit:  Orders Placed This Encounter   Procedures    XR Chest 1 View    CT Head Without Contrast    [place] Draw    Comprehensive Metabolic Panel    High Sensitivity Troponin T    CBC Auto Differential    Sedimentation Rate    C-reactive Protein    Continuous Pulse Oximetry    Vital Signs    Oxygen Therapy- Nasal Cannula; Titrate 1-6 LPM Per SpO2; 90 - 95%    ECG 12 Lead Bradycardia    ECG 12 Lead Chest Pain    ECG 12 Lead Chest Pain    Insert Peripheral IV    CBC & Differential    Green Top (Gel)    Lavender Top    Gold Top - SST    Light Blue Top         Additional orders considered but not ordered:  I considered IV antibiotics but given that she was placed on oral abx today, her esr & crp are normal I think restarting her home meds is sufficient and she can be further managed by the A service for her htn      Differential diagnosis includes but is not limited to:  ICH, htn urgency, post-op infection        Independent interpretation of labs, radiology studies, and discussions with consultants:  ED Course as of 08/23/23 1637   Wed Aug 23, 2023   0932 Had bladder stimulator plcaed July 6th and has had drainage from surgicalsite to R posterior low back for the past2-3 days, saw [facility] today BP was 220/103 and 222/80. No fever or chills. Was started on keflex for 2 weeks. Follows with dr. Zimmerman as her cardiologist. States she has a  headache and some dizziness [AH]   1010 CT brain without contrast demonstrates no acute intracranial hemorrhage. [JR]   1034 Phone call with dr hurley.  Discussed the patient, relevant history, exam, diagnostics, ED findings/progress, and concerns.  He reports no acute bleeding or changes since prior head CT [AH]   1118 Cardene gtt is off now and pt's BP is 140s [AH]   1353 Phone call with dr. justice.  Discussed the patient, relevant history, exam, diagnostics, ED findings/progress, and concerns. They agree to consult. And recommend starting pt on amlodipine   [AH]   1433 Phone call with tylor danielle.  Discussed the patient, relevant history, exam, diagnostics, ED findings/progress, and concerns.  They agree to admit to a tele obs bed   []      ED Course User Index  [] Laurence Sung APRN  [JR] Sean Lopez MD             I have worn appropriate PPE during this patient encounter, sanitized my hands both with entering and exiting patient's room.      DIAGNOSIS  Final diagnoses:   Hypertensive urgency         DISPOSITION  Admitted to medicine            Latest Documented Vital Signs:  As of 10:11 EDT  BP- (!) 241/93 HR- 53 Temp- 97 °F (36.1 °C) (Tympanic) O2 sat- 96%              --    Please note that portions of this were completed with a voice recognition program.       Note Disclaimer: At [facility], we believe that sharing information builds trust and better relationships. You are receiving this note because you are receiving care at [facility] or recently visited. It is possible you will see health information before a provider has talked with you about it. This kind of information can be easy to misunderstand. To help you fully understand what it means for your health, we urge you to discuss this note with your provider.             Laurence Sung APRN  08/23/23 4536

## 2023-08-24 ENCOUNTER — APPOINTMENT (OUTPATIENT)
Dept: GENERAL RADIOLOGY | Facility: HOSPITAL | Age: 78
End: 2023-08-24
Payer: MEDICARE

## 2023-08-24 ENCOUNTER — APPOINTMENT (OUTPATIENT)
Dept: CT IMAGING | Facility: HOSPITAL | Age: 78
End: 2023-08-24
Payer: MEDICARE

## 2023-08-24 LAB
ALBUMIN SERPL-MCNC: 3.7 G/DL (ref 3.5–5.2)
ALBUMIN/GLOB SERPL: 1.9 G/DL
ALP SERPL-CCNC: 56 U/L (ref 39–117)
ALT SERPL W P-5'-P-CCNC: 16 U/L (ref 1–33)
ANION GAP SERPL CALCULATED.3IONS-SCNC: 6.9 MMOL/L (ref 5–15)
AST SERPL-CCNC: 23 U/L (ref 1–32)
BASOPHILS # BLD AUTO: 0.04 10*3/MM3 (ref 0–0.2)
BASOPHILS NFR BLD AUTO: 0.9 % (ref 0–1.5)
BILIRUB SERPL-MCNC: 0.5 MG/DL (ref 0–1.2)
BUN SERPL-MCNC: 18 MG/DL (ref 8–23)
BUN/CREAT SERPL: 27.3 (ref 7–25)
CALCIUM SPEC-SCNC: 9.1 MG/DL (ref 8.6–10.5)
CHLORIDE SERPL-SCNC: 106 MMOL/L (ref 98–107)
CO2 SERPL-SCNC: 27.1 MMOL/L (ref 22–29)
CREAT SERPL-MCNC: 0.66 MG/DL (ref 0.57–1)
DEPRECATED RDW RBC AUTO: 40.3 FL (ref 37–54)
EGFRCR SERPLBLD CKD-EPI 2021: 91 ML/MIN/1.73
EOSINOPHIL # BLD AUTO: 0.18 10*3/MM3 (ref 0–0.4)
EOSINOPHIL NFR BLD AUTO: 3.9 % (ref 0.3–6.2)
ERYTHROCYTE [DISTWIDTH] IN BLOOD BY AUTOMATED COUNT: 11.9 % (ref 12.3–15.4)
GLOBULIN UR ELPH-MCNC: 2 GM/DL
GLUCOSE SERPL-MCNC: 93 MG/DL (ref 65–99)
HCT VFR BLD AUTO: 37.5 % (ref 34–46.6)
HGB BLD-MCNC: 12.3 G/DL (ref 12–15.9)
IMM GRANULOCYTES # BLD AUTO: 0.01 10*3/MM3 (ref 0–0.05)
IMM GRANULOCYTES NFR BLD AUTO: 0.2 % (ref 0–0.5)
LYMPHOCYTES # BLD AUTO: 2.01 10*3/MM3 (ref 0.7–3.1)
LYMPHOCYTES NFR BLD AUTO: 43 % (ref 19.6–45.3)
MAGNESIUM SERPL-MCNC: 2.1 MG/DL (ref 1.6–2.4)
MCH RBC QN AUTO: 30.1 PG (ref 26.6–33)
MCHC RBC AUTO-ENTMCNC: 32.8 G/DL (ref 31.5–35.7)
MCV RBC AUTO: 91.9 FL (ref 79–97)
MONOCYTES # BLD AUTO: 0.44 10*3/MM3 (ref 0.1–0.9)
MONOCYTES NFR BLD AUTO: 9.4 % (ref 5–12)
NEUTROPHILS NFR BLD AUTO: 1.99 10*3/MM3 (ref 1.7–7)
NEUTROPHILS NFR BLD AUTO: 42.6 % (ref 42.7–76)
NRBC BLD AUTO-RTO: 0 /100 WBC (ref 0–0.2)
PLATELET # BLD AUTO: 212 10*3/MM3 (ref 140–450)
PMV BLD AUTO: 10.6 FL (ref 6–12)
POTASSIUM SERPL-SCNC: 3.8 MMOL/L (ref 3.5–5.2)
PROT SERPL-MCNC: 5.7 G/DL (ref 6–8.5)
RBC # BLD AUTO: 4.08 10*6/MM3 (ref 3.77–5.28)
SODIUM SERPL-SCNC: 140 MMOL/L (ref 136–145)
TROPONIN T SERPL HS-MCNC: 12 NG/L
TSH SERPL DL<=0.05 MIU/L-ACNC: 2.83 UIU/ML (ref 0.27–4.2)
WBC NRBC COR # BLD: 4.67 10*3/MM3 (ref 3.4–10.8)

## 2023-08-24 PROCEDURE — 99214 OFFICE O/P EST MOD 30 MIN: CPT | Performed by: INTERNAL MEDICINE

## 2023-08-24 PROCEDURE — 96375 TX/PRO/DX INJ NEW DRUG ADDON: CPT

## 2023-08-24 PROCEDURE — G0378 HOSPITAL OBSERVATION PER HR: HCPCS

## 2023-08-24 PROCEDURE — 99205 OFFICE O/P NEW HI 60 MIN: CPT | Performed by: STUDENT IN AN ORGANIZED HEALTH CARE EDUCATION/TRAINING PROGRAM

## 2023-08-24 PROCEDURE — 83735 ASSAY OF MAGNESIUM: CPT | Performed by: INTERNAL MEDICINE

## 2023-08-24 PROCEDURE — 85025 COMPLETE CBC W/AUTO DIFF WBC: CPT | Performed by: INTERNAL MEDICINE

## 2023-08-24 PROCEDURE — 80053 COMPREHEN METABOLIC PANEL: CPT | Performed by: INTERNAL MEDICINE

## 2023-08-24 PROCEDURE — 71250 CT THORAX DX C-: CPT

## 2023-08-24 PROCEDURE — 96365 THER/PROPH/DIAG IV INF INIT: CPT

## 2023-08-24 PROCEDURE — 25010000002 CEFAZOLIN IN DEXTROSE 2-4 GM/100ML-% SOLUTION: Performed by: UROLOGY

## 2023-08-24 PROCEDURE — 84443 ASSAY THYROID STIM HORMONE: CPT | Performed by: INTERNAL MEDICINE

## 2023-08-24 PROCEDURE — 71046 X-RAY EXAM CHEST 2 VIEWS: CPT

## 2023-08-24 PROCEDURE — 25010000002 HYDRALAZINE PER 20 MG: Performed by: INTERNAL MEDICINE

## 2023-08-24 PROCEDURE — 84484 ASSAY OF TROPONIN QUANT: CPT | Performed by: INTERNAL MEDICINE

## 2023-08-24 RX ORDER — SACCHAROMYCES BOULARDII 250 MG
250 CAPSULE ORAL 2 TIMES DAILY
Status: DISCONTINUED | OUTPATIENT
Start: 2023-08-24 | End: 2023-08-27 | Stop reason: HOSPADM

## 2023-08-24 RX ORDER — CEFAZOLIN SODIUM 2 G/100ML
2000 INJECTION, SOLUTION INTRAVENOUS EVERY 8 HOURS
Status: DISPENSED | OUTPATIENT
Start: 2023-08-24 | End: 2023-08-26

## 2023-08-24 RX ORDER — SPIRONOLACTONE 25 MG/1
25 TABLET ORAL DAILY
Status: DISCONTINUED | OUTPATIENT
Start: 2023-08-24 | End: 2023-08-25

## 2023-08-24 RX ORDER — METOPROLOL SUCCINATE 50 MG/1
50 TABLET, EXTENDED RELEASE ORAL DAILY
Status: DISCONTINUED | OUTPATIENT
Start: 2023-08-24 | End: 2023-08-25

## 2023-08-24 RX ADMIN — CEPHALEXIN 500 MG: 500 CAPSULE ORAL at 09:27

## 2023-08-24 RX ADMIN — ACETAMINOPHEN 650 MG: 325 TABLET, FILM COATED ORAL at 20:35

## 2023-08-24 RX ADMIN — ASPIRIN 81 MG: 81 TABLET, COATED ORAL at 09:27

## 2023-08-24 RX ADMIN — CEPHALEXIN 500 MG: 500 CAPSULE ORAL at 12:44

## 2023-08-24 RX ADMIN — CEFAZOLIN SODIUM 2000 MG: 2 INJECTION, SOLUTION INTRAVENOUS at 20:34

## 2023-08-24 RX ADMIN — Medication 10 ML: at 09:27

## 2023-08-24 RX ADMIN — HYDRALAZINE HYDROCHLORIDE 10 MG: 20 INJECTION INTRAMUSCULAR; INTRAVENOUS at 21:39

## 2023-08-24 RX ADMIN — METOPROLOL SUCCINATE 50 MG: 50 TABLET, FILM COATED, EXTENDED RELEASE ORAL at 09:26

## 2023-08-24 RX ADMIN — MUPIROCIN 1 APPLICATION: 20 OINTMENT TOPICAL at 15:36

## 2023-08-24 RX ADMIN — Medication 250 MG: at 15:36

## 2023-08-24 RX ADMIN — PANTOPRAZOLE SODIUM 40 MG: 40 TABLET, DELAYED RELEASE ORAL at 07:03

## 2023-08-24 RX ADMIN — ATORVASTATIN CALCIUM 40 MG: 20 TABLET, FILM COATED ORAL at 20:35

## 2023-08-24 RX ADMIN — Medication 10 ML: at 20:35

## 2023-08-24 RX ADMIN — VALSARTAN 160 MG: 160 TABLET, FILM COATED ORAL at 20:35

## 2023-08-24 RX ADMIN — SODIUM CHLORIDE 500 ML: 9 INJECTION, SOLUTION INTRAVENOUS at 00:03

## 2023-08-24 RX ADMIN — Medication 250 MG: at 21:32

## 2023-08-24 RX ADMIN — ACETAMINOPHEN 650 MG: 325 TABLET, FILM COATED ORAL at 15:26

## 2023-08-24 RX ADMIN — ACETAMINOPHEN 650 MG: 325 TABLET, FILM COATED ORAL at 07:03

## 2023-08-24 NOTE — NURSING NOTE
Wound/Ostomy: We see the patient at the request of the nurse on the floor, regarding skin issue on rt Hip. Patient is sitting on the chair, able to stand up by herself. Upon assessment is observed superficial partial-thickness skin loss, addis-area  excoriated and serous drainage of scant amount, Bactroban ointment is ordered.   Please re-consult for any additional needs.

## 2023-08-24 NOTE — SIGNIFICANT NOTE
08/24/23 1202   OTHER   Discipline occupational therapist   Rehab Time/Intention   Session Not Performed other (see comments)  (Pt denies need for OT eval, reports she has no difficulty with ADLs. Nsg notes report pt has been getting up ab aye to BSC.)

## 2023-08-24 NOTE — CONSULTS
Referring Provider: Pili Vargas, *  Reason for Consultation: pt has stimulator in back, ?dehisc and seroma or really infected? sed rate ok, urology wants keflex   Chief Complaint   Patient presents with    Hypertension         Subjective   History of present illness: Patient is a 77-year-old female who presents in the setting of hypertension from urology office.  Patient was being evaluated in urology in the setting of recent bladder stimulator implantation that was performed on 7/6/2023.    In the office patient was noted to have a seroma with small amount of drainage from the wound and the plan was for a 10-day course of Keflex.  Her blood pressure was found to be quite elevated and she was referred to the ER for further evaluation.    On presentation patient has been afebrile with no leukocytosis.  Inflammatory markers are reassuring with no elevation and she has been maintained on Keflex.  She denies any fevers or chills.  States that she had a small amount of drainage that is started to occur in the right lateral aspect of the surgical scar that is clear.  Denies any fevers or chills.  Denies any purulence.  States it is clear drainage.  Denies any redness around the scar.  States it is slightly tender to touch.    Past Medical History:   Diagnosis Date    Abnormal stress test 01/13/2020    Added automatically from request for surgery [id number]    Acid reflux disease     Arthritis     Bell's palsy     Bronchiectasis without complication     Dr Campbell    Cerebellar infarction     Chest pain     CVA (cerebral vascular accident) 05/19/2014    Overview:  Per old records in distant past in internal capsule    History of ANTONY infection 08/23/2023    history of ANTONY but AFB negative on bronchoscopy 11/2018.     Hyperlipidemia     Hypertension     Lung disease     Osteoporosis     PAF (paroxysmal atrial fibrillation)     Skin cancer     Stroke     TIA (transient ischemic attack)     Urinary frequency     wears  pads    Urinary tract infection        Past Surgical History:   Procedure Laterality Date    ADENOIDECTOMY      APPENDECTOMY      BREAST FIBROADENOMA SURGERY      BRONCHOSCOPY      CARDIAC CATHETERIZATION N/A 01/17/2020    Procedure: Left Heart Cath;  Surgeon: Kingsley Zimmerman MD;  Location:  [facility];  Service: Cardiovascular    CARDIAC CATHETERIZATION N/A 01/17/2020    Procedure: Coronary angiography;  Surgeon: Kingsley Zimmerman MD;  Location:  [facility];  Service: Cardiovascular    CARDIAC CATHETERIZATION N/A 01/17/2020    Procedure: Left ventriculography;  Surgeon: Kingsley Zimmerman MD;  Location:  [facility];  Service: Cardiovascular    CARDIAC CATHETERIZATION N/A 01/17/2020    Procedure: Percutaneous Coronary Intervention;  Surgeon: Kingsley Zimmerman MD;  Location:  [facility];  Service: Cardiovascular    CARDIAC CATHETERIZATION N/A 01/17/2020    Procedure: Stent GERA coronary;  Surgeon: Kingsley Zimmerman MD;  Location:  [facility];  Service: Cardiovascular    CARDIAC CATHETERIZATION N/A 04/04/2022    Procedure: Coronary angiography;  Surgeon: Kvng Burgos MD;  Location:  [facility];  Service: Cardiovascular;  Laterality: N/A;    CARDIAC CATHETERIZATION N/A 04/04/2022    Procedure: Left heart cath;  Surgeon: Kvng Burgos MD;  Location:  [facility];  Service: Cardiovascular;  Laterality: N/A;    CARDIAC CATHETERIZATION N/A 04/04/2022    Procedure: Left ventriculography;  Surgeon: Kvng Burgos MD;  Location:  [facility];  Service: Cardiovascular;  Laterality: N/A;    COLONOSCOPY      HYSTERECTOMY      INTERSTIM PLACEMENT N/A 7/6/2023    Procedure: INTERSTIM STAGE 1/2;  Surgeon: Prasanna Alfaro MD;  [facility] MA[facility];  Service: Urology;  Laterality: N/A;    INTERSTIM PLACEMENT N/A 7/6/2023    Procedure: INTERSTIM STAGE 1/2;  Surgeon:  Prasanna Alfaro MD;  Location: [facility] MAIN OR;  Service: Urology;  Laterality: N/A;    MASTOID SURGERY      TONSILLECTOMY         family history includes Aneurysm in her daughter; Breast cancer in her mother; Heart disease in her father; Heart failure in her father; Hypertension in her father.     reports that she quit smoking about 33 years ago. Her smoking use included cigarettes. She started smoking about 63 years ago. She has a 45.00 pack-year smoking history. She has never used smokeless tobacco. She reports that she does not currently use alcohol. She reports that she does not currently use drugs.     Allergies   Allergen Reactions    Codeine Hives and Nausea Only    Contrast Dye (Echo Or Unknown Ct/Mr) Hives    Ct Contrast Hives    Erythromycin Diarrhea     Side effect     Gadolinium Derivatives (Mr Contrast) Hives    Iodine Hives    Lisinopril Other (See Comments)     COUGH    Morphine Itching and Delirium    Moxifloxacin      Elevated liver enzymes    Sulfa Antibiotics Hives and Nausea Only    Pneumococcal Vaccines Swelling and Rash       Medication:  Antibiotics:  Anti-Infectives (From admission, onward)      Ordered     Dose/Rate Route Frequency Start Stop    08/23/23 2044  cephalexin (KEFLEX) capsule 500 mg        Ordering Provider: Pili Vargas MD    500 mg Oral 4 Times Daily 08/23/23 2130 08/30/23 2059              Objective     Physical Exam:   Vital Signs   Temp:  [97.7 °F (36.5 °C)-97.9 °F (36.6 °C)] 97.9 °F (36.6 °C)  Heart Rate:  [45-75] 51  Resp:  [18] 18  BP: ()/(37-98) 176/76    GENERAL: Awake and alert, in no acute distress.   HEENT: Oropharynx is clear. Hearing is grossly normal.   EYES: PERRL. No conjunctival injection. No lid lag.   HEART: Regular rate and regular rhythm. No peripheral edema.   LUNGS: Normal work of breathing  GI: Soft, nontender, nondistended.   SKIN: Right posterior bladder stimulator surgical site with no surrounding erythema.  Small area of  dehiscence of the lateral aspect of the scar.  Minimal clear drainage.  No purulence.  PSYCHIATRIC: Appropriate mood, affect, insight, and judgment.     Results Review:   I reviewed the patient's new clinical results.  I reviewed the patient's new imaging results and agree with the interpretation.  I reviewed the patient's other test results and agree with the interpretation    Lab Results   Component Value Date    WBC 4.67 08/24/2023    HGB 12.3 08/24/2023    HCT 37.5 08/24/2023    MCV 91.9 08/24/2023     08/24/2023       No results found for: VANCOPEAK, VANCOTROUGH, VANCORANDOM    Lab Results   Component Value Date    GLUCOSE 93 08/24/2023    BUN 18 08/24/2023    CREATININE 0.66 08/24/2023    EGFRIFNONA 100 01/18/2020    BCR 27.3 (H) 08/24/2023    CO2 27.1 08/24/2023    CALCIUM 9.1 08/24/2023    ALBUMIN 3.7 08/24/2023    LABIL2 2.2 03/04/2022    AST 23 08/24/2023    ALT 16 08/24/2023         Estimated Creatinine Clearance: 59.8 mL/min (by C-G formula based on SCr of 0.66 mg/dL).      Microbiology:  None    Radiology:  8/23 chest x-ray reviewed by me with no acute infiltrates.    Assessment     #Hypertensive emergency  #Headache  #Seroma versus infection of bladder stimulator    Agree with Keflex 500 mg 4 times daily for coverage of skin and soft tissue organisms in the setting of bladder stimulator surgical site drainage.  Site appears clean and without purulence or erythema which is reassuring.  Labs also reassuring with no fever, leukocytosis or elevated inflammatory markers.  Agree with urology plan to treat for 10 days and follow-up in urology clinic for further monitoring.    Thank you for allowing me to be involved in the care of this patient. Infectious diseases will sign off at this time with antibiotics plan in place, but please call me at [phone] if any further ID questions or new ID concerns.

## 2023-08-24 NOTE — CONSULTS
[facility] Consult Note       Encounter Date:23  Patient:Della Vines  :1945  MRN:[id number]    Date of Admission: 2023  Date of Encounter Visit: 23  Encounter Provider: Kingsley Zimmerman MD  Referring Provider: Pili Vargas, *  Place of Service: [facility]  Patient Care Team:  Josep Kelley MD as PCP - General (Internal Medicine)  Kingsley Zimmerman MD as Consulting Physician (Cardiology)      Consulted for: HTN     Chief Complaint: HTN and headache       History of Presenting Illness:      Ms. Vines is a 77 y.o. woman with past medical history notable for coronary artery disease status post percutaneous intervention, TIAs, hypertension, mixed hyperlipidemia, and chronic lung disease who presents to the emergency room after being directed from the urology's office due to high blood pressure.  In general patient had been doing fairly well I last saw her in the office in May she was having somewhat labile blood pressures but in the office blood pressures only mildly elevated and actually better when checked at home per her report.  We have plans to have her call us with blood pressure readings over the next week or so to see where her blood pressure was at her blood pressure is very challenging to control given a lot of intolerances to medications.  Unfortunately we never received any follow-up regarding her home blood pressure readings.  It sounds like at [place] at times her blood pressure can be high.  Check she went to her urologist office yesterday due to concerns for poor wound healing of her recent bladder stimulator.  There she was noted to have high blood pressure.  She did have a mild headache but in general was fine she was directed to the emergency room in the emergency room she was noted to have significantly elevated blood pressures.  She was placed on Cardene drip immediately had improvement in her blood pressure and concerns  about sending her home and discussed the case with me so would be reasonable to admit her under objects here in the morning and see if any changes to her blood pressure regimen would be appropriate.  Overnight into this morning her blood pressures been excellent this morning blood pressure is elevated prior to receiving her home regimen.    Leading up to this again she does have occasional symptoms of headache which she relates to her elevated blood pressures.  Outside of this has no other major symptoms concerning for hypertensive crisis.  Symptoms are mild to moderate in severity.  Usually last while the blood pressure was elevated and resolved and blood pressures improved.  No associated symptoms such as nausea or diaphoresis.  Denies any chest pain.      Review of Systems:  Review of Systems   Constitutional: Negative.   Eyes: Negative.    Cardiovascular: Negative.    Respiratory: Negative.     Endocrine: Negative.    Hematologic/Lymphatic: Negative.    Skin:  Positive for poor wound healing.   Musculoskeletal: Negative.    Gastrointestinal: Negative.    Genitourinary: Negative.    Neurological:  Positive for headaches.   Psychiatric/Behavioral: Negative.     Allergic/Immunologic: Negative.      Medications:    Current Facility-Administered Medications:     acetaminophen (TYLENOL) tablet 650 mg, 650 mg, Oral, Q4H PRN, 650 mg at 08/24/23 0703 **OR** acetaminophen (TYLENOL) 160 MG/5ML solution 650 mg, 650 mg, Oral, Q4H PRN **OR** acetaminophen (TYLENOL) suppository 650 mg, 650 mg, Rectal, Q4H PRN, Pili Vargas MD    acetaminophen (TYLENOL) tablet 650 mg, 650 mg, Oral, Q6H PRN, Pili Vargas MD, 650 mg at 08/23/23 1620    aspirin EC tablet 81 mg, 81 mg, Oral, Daily, iPli Vargas MD    atorvastatin (LIPITOR) tablet 40 mg, 40 mg, Oral, Nightly, Pili Vargas MD    sennosides-docusate (PERICOLACE) 8.6-50 MG per tablet 2 tablet, 2 tablet, Oral, BID **AND** polyethylene glycol  (MIRALAX) packet 17 g, 17 g, Oral, Daily PRN **AND** bisacodyl (DULCOLAX) EC tablet 5 mg, 5 mg, Oral, Daily PRN **AND** bisacodyl (DULCOLAX) suppository 10 mg, 10 mg, Rectal, Daily PRN, Pili Vargas MD    calcium carbonate (TUMS) chewable tablet 500 mg (200 mg elemental), 2 tablet, Oral, BID PRN, Pili Vargas MD    cephalexin (KEFLEX) capsule 500 mg, 500 mg, Oral, 4x Daily, Pili Vargas MD, 500 mg at 08/23/23 2222    hydrALAZINE (APRESOLINE) injection 10 mg, 10 mg, Intravenous, Q4H PRN, Pili Vargas MD    melatonin tablet 1 mg, 1 mg, Oral, Nightly PRN, Pili Vargas MD    metoprolol succinate XL (TOPROL-XL) 24 hr tablet 50 mg, 50 mg, Oral, Daily, Pili Vargas MD    nitroglycerin (NITROSTAT) SL tablet 0.4 mg, 0.4 mg, Sublingual, Q5 Min PRN, Pili Vargas MD    ondansetron (ZOFRAN) tablet 4 mg, 4 mg, Oral, Q6H PRN **OR** ondansetron (ZOFRAN) injection 4 mg, 4 mg, Intravenous, Q6H PRN, Pili Vargas MD    pantoprazole (PROTONIX) EC tablet 40 mg, 40 mg, Oral, QAM, Pili Vargas MD, 40 mg at 08/24/23 0703    sodium chloride 0.9 % flush 10 mL, 10 mL, Intravenous, Q12H, Pili Vargas MD, 10 mL at 08/23/23 2020    sodium chloride 0.9 % flush 10 mL, 10 mL, Intravenous, PRN, Pili Vargas MD    sodium chloride 0.9 % infusion 40 mL, 40 mL, Intravenous, PRN, Pili Vargas MD    spironolactone (ALDACTONE) tablet 25 mg, 25 mg, Oral, Daily, Kingsley Zimmerman MD    valsartan (DIOVAN) tablet 160 mg, 160 mg, Oral, Nightly, Pili Vargas MD    Allergies   Allergen Reactions    Codeine Hives and Nausea Only    Contrast Dye (Echo Or Unknown Ct/Mr) Hives    Ct Contrast Hives    Erythromycin Diarrhea     Side effect     Gadolinium Derivatives (Mr Contrast) Hives    Iodine Hives    Lisinopril Other (See Comments)     COUGH    Morphine Itching and Delirium    Moxifloxacin      Elevated liver enzymes     Sulfa Antibiotics Hives and Nausea Only    Pneumococcal Vaccines Swelling and Rash       Past Medical History:   Diagnosis Date    Abnormal stress test 01/13/2020    Added automatically from request for surgery [id number]    Acid reflux disease     Arthritis     Bell's palsy     Bronchiectasis without complication     Dr Campbell    Cerebellar infarction     Chest pain     CVA (cerebral vascular accident) 05/19/2014    Overview:  Per old records in distant past in internal capsule    History of ANTONY infection 08/23/2023    history of ANTONY but AFB negative on bronchoscopy 11/2018.     Hyperlipidemia     Hypertension     Lung disease     Osteoporosis     PAF (paroxysmal atrial fibrillation)     Skin cancer     Stroke     TIA (transient ischemic attack)     Urinary frequency     wears pads    Urinary tract infection        Past Surgical History:   Procedure Laterality Date    ADENOIDECTOMY      APPENDECTOMY      BREAST FIBROADENOMA SURGERY      BRONCHOSCOPY      CARDIAC CATHETERIZATION N/A 01/17/2020    Procedure: Left Heart Cath;  Surgeon: Kingsley Zimmerman MD;  Location:  [facility];  Service: Cardiovascular    CARDIAC CATHETERIZATION N/A 01/17/2020    Procedure: Coronary angiography;  Surgeon: Kingsley Zimmerman MD;  Location:  [facility];  Service: Cardiovascular    CARDIAC CATHETERIZATION N/A 01/17/2020    Procedure: Left ventriculography;  Surgeon: Kingsley Zimmerman MD;  Location:  [facility];  Service: Cardiovascular    CARDIAC CATHETERIZATION N/A 01/17/2020    Procedure: Percutaneous Coronary Intervention;  Surgeon: Kingsley Zimmerman MD;  Location:  [facility];  Service: Cardiovascular    CARDIAC CATHETERIZATION N/A 01/17/2020    Procedure: Stent GERA coronary;  Surgeon: Kingsley Zimmerman MD;  Location:  [facility];  Service: Cardiovascular    CARDIAC CATHETERIZATION N/A 04/04/2022    Procedure: Coronary angiography;   Surgeon: Kvng Burgos MD;  Location:  [facility];  Service: Cardiovascular;  Laterality: N/A;    CARDIAC CATHETERIZATION N/A 2022    Procedure: Left heart cath;  Surgeon: Kvng Burgos MD;  Location:  [facility];  Service: Cardiovascular;  Laterality: N/A;    CARDIAC CATHETERIZATION N/A 2022    Procedure: Left ventriculography;  Surgeon: Kvng Burgos MD;  Location:  [facility];  Service: Cardiovascular;  Laterality: N/A;    COLONOSCOPY      HYSTERECTOMY      INTERSTIM PLACEMENT N/A 2023    Procedure: INTERSTIM STAGE 1/2;  Surgeon: Prasanna lAfaro MD;  Location:  [facility]IN OR;  Service: Urology;  Laterality: N/A;    INTERSTIM PLACEMENT N/A 2023    Procedure: INTERSTIM STAGE 1/2;  Surgeon: Prasanna Alfaro MD;  [facility];  Service: Urology;  Laterality: N/A;    MASTOID SURGERY      TONSILLECTOMY         Social History     Socioeconomic History    Marital status:      Spouse name: Tyrone Vines    Number of children: 1    Years of education: 14   Tobacco Use    Smoking status: Former     Packs/day: 1.50     Years: 30.00     Pack years: 45.00     Types: Cigarettes     Start date:      Quit date:      Years since quittin.6    Smokeless tobacco: Never    Tobacco comments:     caffeine use: 1 CUP COFFEE DAILY    Vaping Use    Vaping Use: Never used   Substance and Sexual Activity    Alcohol use: Not Currently     Comment: quit 42 years ago    Drug use: Not Currently     Comment: 42 years ago    Sexual activity: Defer       Family History   Problem Relation Age of Onset    Breast cancer Mother     Heart failure Father     Hypertension Father     Heart disease Father     Aneurysm Daughter     Malig Hyperthermia Neg Hx        The following portions of the patient's history were reviewed and updated as appropriate: allergies, current medications, past family history, past medical history, past  social history, past surgical history and problem list.         Objective:      Temp:  [97 °F (36.1 °C)-97.9 °F (36.6 °C)] 97.9 °F (36.6 °C)  Heart Rate:  [45-75] 51  Resp:  [18-20] 18  BP: ()/() 176/76     Intake/Output Summary (Last 24 hours) at 8/24/2023 0858  Last data filed at 8/24/2023 0010  Gross per 24 hour   Intake 360 ml   Output --   Net 360 ml     Body mass index is 20.37 kg/m².      08/23/23  1044   Weight: 52.2 kg (115 lb)           Physical Exam:  Constitutional: Well appearing, well developed, no acute distress   HENT: Oropharynx clear and membrane moist  Eyes: Normal conjunctiva, no sclera icterus.  Neck: Supple, no carotid bruit bilaterally.  Cardiovascular: Regular rate and rhythm, No Murmur, No bilateral lower extremity edema.  Pulmonary: Normal respiratory effort, normal lung sounds, no wheezing.  Abdominal: Soft, nontender, no hepatosplenomegaly, liver is non-pulsatile.  Neurological: Alert and orient x 3.   Skin: Warm, dry, no ecchymosis, no rash.  Psych: Appropriate mood and affect. Normal judgment and insight.         Lab Review:   Results from last 7 days   Lab Units 08/24/23  0400 08/23/23  1112   SODIUM mmol/L 140 142   POTASSIUM mmol/L 3.8 4.6   CHLORIDE mmol/L 106 106   CO2 mmol/L 27.1 25.0   BUN mg/dL 18 14   CREATININE mg/dL 0.66 0.66   GLUCOSE mg/dL 93 98   CALCIUM mg/dL 9.1 10.0   AST (SGOT) U/L 23 37*   ALT (SGPT) U/L 16 23     Results from last 7 days   Lab Units 08/24/23  0400 08/23/23  1316 08/23/23  1112   HSTROP T ng/L 12* 11* 11*     Results from last 7 days   Lab Units 08/24/23  0400 08/23/23  1003 08/17/23  1044   WBC 10*3/mm3 4.67 6.64 8.95   HEMOGLOBIN g/dL 12.3 14.9 14.2   HEMATOCRIT % 37.5 44.9 44.5   PLATELETS 10*3/mm3 212 246 258         Results from last 7 days   Lab Units 08/24/23  0400   MAGNESIUM mg/dL 2.1           Invalid input(s): LDLCALC  The ASCVD Risk score (Rosalia CONKLIN, et al., 2019) failed to calculate for the following reasons:    The patient  has a prior MI or stroke diagnosis         Results from last 7 days   Lab Units 08/24/23  0400   TSH uIU/mL 2.830                     I reviewed her EKGs above which show sinus rhythm no change from prior         Assessment:           Mixed incontinence    Bronchiectasis    Hyperlipidemia    History of CVA (cerebrovascular accident)    S/P angioplasty with stent    Hypertensive emergency    Acute headache    Complication of urinary electronic stimulator device, initial encounter    History of ANTONY infection    Labile blood pressure         Plan:       Ms. Vines is a 77 y.o. woman with past medical history notable for coronary artery disease status post percutaneous intervention, TIAs, hypertension, mixed hyperlipidemia, and chronic lung disease who presents to the emergency room after being directed from the urology's office due to high blood pressure.  Her blood pressure is labile at baseline and patient is a lot of intolerances.  She was taken off of the Cardene drip fairly quickly she was given a dose of amlodipine unfortunately despite amlodipine working fantastic for her blood pressure it causes leg swellings she will not take it at home.  Faby try her on Aldactone this was an option that we had discussed in the office back in May if her blood pressure was elevated I think we can start that today and see how she does.  From a cardiac standpoint this can be continued to be titrated as an outpatient does not need to stay in the hospital for this but if staying for her wound from her bladder stimulator can monitor and make adjustments while she is here.    Coronary artery disease without angina:  Continue aspirin   Continue statin  Continue beta-blocker        Hypertension:  Elevated on arrival improved with Cardene given amlodipine last night but unable to tolerate this is an outpatient  Challenging to control due to intolerance of many medications  Unable to tolerate amlodipine due to leg swelling  Had issues  with leg cramping on chlorthalidone  Had bladder leakage on other diuretics including hydrochlorothiazide  Continue metoprolol succinate but could change to carvedilol as an opportunity to optimize blood pressure  Try adding spironolactone today     Mixed hyperlipidemia:  Patient currently on statin therapy  Lipid panel 3/2022 demonstrates good LDL control  CMP 10/2022 demonstrates normal ALT and AST     History of CVA:  Continue aspirin and statin         Thank you for allowing me to participate in the care of Della BEL Vines. Feel free to contact me directly with any further questions or concerns.    Kingsley Zimmerman MD  [facility]  08/24/23  08:58 EDT

## 2023-08-24 NOTE — SIGNIFICANT NOTE
08/24/23 1512   OTHER   Discipline physical therapist   Rehab Time/Intention   Session Not Performed other (see comments)  (PT eval order received, pt up ad aye in room, reports no mobility concerns, Am-Pacs score of 24. No indication for acute PT eval.)

## 2023-08-24 NOTE — CONSULTS
FIRST [facility]OLOGY CONSULT      Patient Identification:  NAME:  Della Vines  Age:  77 y.o.   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  She had excellent results with PNE and then she was switched over to placement of her InterStim by Dr. Alfaro and has had very favorable results.  Unfortunately started noticed some serous drainage from the wound.  Wound nurse has seen her and place an appropriate dressing over this.  I see no surrounding cellulitis.  She was put on spironolactone but they are holding it for parameters of her blood pressure and so she is little bit of a concern about taking this potentially weak diuretic and exacerbating her voiding pattern.      Past medical history:  Past Medical History:   Diagnosis Date    Abnormal stress test 2020    Added automatically from request for surgery [id number]    Acid reflux disease     Arthritis     Bell's palsy     Bronchiectasis without complication     Dr Campbell    Cerebellar infarction     Chest pain     CVA (cerebral vascular accident) 2014    Overview:  Per old records in distant past in internal capsule    History of ANTONY infection 2023    history of ANTONY but AFB negative on bronchoscopy 2018.     Hyperlipidemia     Hypertension     Lung disease     Osteoporosis     PAF (paroxysmal atrial fibrillation)     Skin cancer     Stroke     TIA (transient ischemic attack)     Urinary frequency     wears pads    Urinary tract infection        Past surgical history:  Past Surgical History:   Procedure Laterality Date    ADENOIDECTOMY      APPENDECTOMY      BREAST FIBROADENOMA SURGERY      BRONCHOSCOPY      CARDIAC CATHETERIZATION N/A 2020    Procedure: Left Heart Cath;  Surgeon: Kingsley Zimmerman MD;  Location:   [facility];  Service: Cardiovascular    CARDIAC CATHETERIZATION N/A 01/17/2020    Procedure: Coronary angiography;  Surgeon: Kingsley Zimmerman MD;  Location:  [facility];  Service: Cardiovascular    CARDIAC CATHETERIZATION N/A 01/17/2020    Procedure: Left ventriculography;  Surgeon: Kingsley Zimmerman MD;  Location:  [facility];  Service: Cardiovascular    CARDIAC CATHETERIZATION N/A 01/17/2020    Procedure: Percutaneous Coronary Intervention;  Surgeon: Kingsley Zimmerman MD;  Location:  [facility];  Service: Cardiovascular    CARDIAC CATHETERIZATION N/A 01/17/2020    Procedure: Stent GERA coronary;  Surgeon: Kingsley Zimmerman MD;  Location:  [facility];  Service: Cardiovascular    CARDIAC CATHETERIZATION N/A 04/04/2022    Procedure: Coronary angiography;  Surgeon: Kvng Burgos MD;  Location:  [facility];  Service: Cardiovascular;  Laterality: N/A;    CARDIAC CATHETERIZATION N/A 04/04/2022    Procedure: Left heart cath;  Surgeon: Kvng Burgos MD;  Location:  [facility];  Service: Cardiovascular;  Laterality: N/A;    CARDIAC CATHETERIZATION N/A 04/04/2022    Procedure: Left ventriculography;  Surgeon: Kvng Burgos MD;  Location:  [facility];  Service: Cardiovascular;  Laterality: N/A;    COLONOSCOPY      HYSTERECTOMY      INTERSTIM PLACEMENT N/A 7/6/2023    Procedure: INTERSTIM STAGE 1/2;  Surgeon: Prasanna Alfaro MD;  Location: [facility] MAIN OR;  Service: Urology;  Laterality: N/A;    INTERSTIM PLACEMENT N/A 7/6/2023    Procedure: INTERSTIM STAGE 1/2;  Surgeon: Prasanna Alfaro MD;  Location: [facility] MAIN OR;  Service: Urology;  Laterality: N/A;    MASTOID SURGERY      TONSILLECTOMY         Allergies:  Codeine, Contrast dye (echo or unknown ct/mr), Ct contrast, Erythromycin, Gadolinium derivatives (mr contrast), Iodine, Lisinopril, Morphine,  Moxifloxacin, Sulfa antibiotics, and Pneumococcal vaccines    Home medications:  Medications Prior to Admission   Medication Sig Dispense Refill Last Dose    acetaminophen (TYLENOL) 650 MG 8 hr tablet Take 2 tablets by mouth Every 8 (Eight) Hours As Needed for Mild Pain.       aspirin 81 MG EC tablet Take 1 tablet by mouth Daily.   8/23/2023    atorvastatin (LIPITOR) 40 MG tablet TAKE 1 TABLET BY MOUTH DAILY 90 tablet 3     calcium carbonate (OS-JERRELL) 1250 (500 Ca) MG tablet Take 1 tablet by mouth 2 (Two) Times a Day.   8/23/2023    metoprolol succinate XL (TOPROL-XL) 50 MG 24 hr tablet TAKE 1 TABLET BY MOUTH DAILY (Patient taking differently: Take 1 tablet by mouth Every Morning.) 90 tablet 3 8/23/2023    pantoprazole (PROTONIX) 40 MG EC tablet TAKE 1 TABLET BY MOUTH EVERY MORNING 90 tablet 3 8/23/2023    valsartan (DIOVAN) 160 MG tablet Take 1 tablet by mouth Every Night.   8/22/2023        Hospital medications:  aspirin, 81 mg, Oral, Daily  atorvastatin, 40 mg, Oral, Nightly  ceFAZolin, 2,000 mg, Intravenous, Q8H  metoprolol succinate XL, 50 mg, Oral, Daily  mupirocin, 1 application , Topical, Q12H  pantoprazole, 40 mg, Oral, QAM  saccharomyces boulardii, 250 mg, Oral, BID  senna-docusate sodium, 2 tablet, Oral, BID  sodium chloride, 10 mL, Intravenous, Q12H  spironolactone, 25 mg, Oral, Daily  valsartan, 160 mg, Oral, Nightly           acetaminophen **OR** acetaminophen **OR** acetaminophen    senna-docusate sodium **AND** polyethylene glycol **AND** bisacodyl **AND** bisacodyl    calcium carbonate    hydrALAZINE    melatonin    nitroglycerin    ondansetron **OR** ondansetron    sodium chloride    sodium chloride    Family history:  Family History   Problem Relation Age of Onset    Breast cancer Mother     Heart failure Father     Hypertension Father     Heart disease Father     Aneurysm Daughter     Malig Hyperthermia Neg Hx        Social history:  Social History     Tobacco Use    Smoking status: Former      Packs/day: 1.50     Years: 30.00     Pack years: 45.00     Types: Cigarettes     Start date:      Quit date:      Years since quittin.6    Smokeless tobacco: Never    Tobacco comments:     caffeine use: 1 CUP COFFEE DAILY    Vaping Use    Vaping Use: Never used   Substance Use Topics    Alcohol use: Not Currently     Comment: quit 42 years ago    Drug use: Not Currently     Comment: 42 years ago       Review of systems:    Negative 12-system ROS except for the following: As above.  Frequency and urgency.  Improving.      Objective:  TMax 24 hours:   Temp (24hrs), Av.8 °F (36.6 °C), Min:97.7 °F (36.5 °C), Max:97.9 °F (36.6 °C)      Vitals Ranges:   Temp:  [97.7 °F (36.5 °C)-97.9 °F (36.6 °C)] 97.9 °F (36.6 °C)  Heart Rate:  [45-65] 59  Resp:  [18] 18  BP: ()/(37-81) 166/74    Intake/Output Last 3 shifts:  I/O last 3 completed shifts:  In: 360 [P.O.:360]  Out: -      Physical Exam:       General Appearance:    Alert, cooperative, in no acute distress   Head:    Normocephalic, without obvious abnormality, atraumatic   Eyes:          PERRL, conjunctivae and corneas clear   Ears:    Normal external inspection   Throat:   No oral lesions, oral mucosa moist   Neck:   Supple, no LAD, trachea midline   Back:     No CVA tenderness   Lungs:     Respirations unlabored, symmetric excursion    Heart:    RRR, intact peripheral pulses   Abdomen:   Soft and benign   :  Deferred   VALERY:  Extremities: Deferred.  Right gluteal region and hip shows no cellulitis.  She has a bandage over her InterStim wound with minimal drainage and all serosanguineous.  Nothing purulent at this time.   Skin:   No bleeding, bruising or rashes   Neuro/Psych:   Orientation intact, mood/affect pleasant, no focal findings       Results review:   I reviewed the patient's new clinical results.    Data review:  Lab Results (last 24 hours)       Procedure Component Value Units Date/Time    CBC & Differential [[id number]]  (Abnormal)  Collected: 08/24/23 0400    Specimen: Blood Updated: 08/24/23 0533    Narrative:      The following orders were created for panel order CBC & Differential.  Procedure                               Abnormality         Status                     ---------                               -----------         ------                     CBC Auto Differential[[id number]]        Abnormal            Final result                 Please view results for these tests on the individual orders.    CBC Auto Differential [[id number]]  (Abnormal) Collected: 08/24/23 0400    Specimen: Blood Updated: 08/24/23 0533     WBC 4.67 10*3/mm3      RBC 4.08 10*6/mm3      Hemoglobin 12.3 g/dL      Hematocrit 37.5 %      MCV 91.9 fL      MCH 30.1 pg      MCHC 32.8 g/dL      RDW 11.9 %      RDW-SD 40.3 fl      MPV 10.6 fL      Platelets 212 10*3/mm3      Neutrophil % 42.6 %      Lymphocyte % 43.0 %      Monocyte % 9.4 %      Eosinophil % 3.9 %      Basophil % 0.9 %      Immature Grans % 0.2 %      Neutrophils, Absolute 1.99 10*3/mm3      Lymphocytes, Absolute 2.01 10*3/mm3      Monocytes, Absolute 0.44 10*3/mm3      Eosinophils, Absolute 0.18 10*3/mm3      Basophils, Absolute 0.04 10*3/mm3      Immature Grans, Absolute 0.01 10*3/mm3      nRBC 0.0 /100 WBC     Comprehensive Metabolic Panel [[id number]]  (Abnormal) Collected: 08/24/23 0400    Specimen: Blood Updated: 08/24/23 0521     Glucose 93 mg/dL      BUN 18 mg/dL      Creatinine 0.66 mg/dL      Sodium 140 mmol/L      Potassium 3.8 mmol/L      Chloride 106 mmol/L      CO2 27.1 mmol/L      Calcium 9.1 mg/dL      Total Protein 5.7 g/dL      Albumin 3.7 g/dL      ALT (SGPT) 16 U/L      AST (SGOT) 23 U/L      Alkaline Phosphatase 56 U/L      Total Bilirubin 0.5 mg/dL      Globulin 2.0 gm/dL      A/G Ratio 1.9 g/dL      BUN/Creatinine Ratio 27.3     Anion Gap 6.9 mmol/L      eGFR 91.0 mL/min/1.73     Narrative:      GFR Normal >60  Chronic Kidney Disease <60  Kidney Failure <15    The GFR formula is  only valid for adults with stable renal function between ages 18 and 70.    Magnesium [[id number]]  (Normal) Collected: 08/24/23 0400    Specimen: Blood Updated: 08/24/23 0521     Magnesium 2.1 mg/dL     High Sensitivity Troponin T [[id number]]  (Abnormal) Collected: 08/24/23 0400    Specimen: Blood Updated: 08/24/23 0506     HS Troponin T 12 ng/L     Narrative:      High Sensitive Troponin T Reference Range:  <10.0 ng/L- Negative Female for AMI  <15.0 ng/L- Negative Male for AMI  >=10 - Abnormal Female indicating possible myocardial injury.  >=15 - Abnormal Male indicating possible myocardial injury.   Clinicians would have to utilize clinical acumen, EKG, Troponin, and serial changes to determine if it is an Acute Myocardial Infarction or myocardial injury due to an underlying chronic condition.         TSH [[id number]]  (Normal) Collected: 08/24/23 0400    Specimen: Blood Updated: 08/24/23 0506     TSH 2.830 uIU/mL              Imaging:  Imaging Results (Last 24 Hours)       Procedure Component Value Units Date/Time    CT Chest Without Contrast Diagnostic [[id number]] Collected: 08/24/23 1850     Updated: 08/24/23 1914    Narrative:      CT CHEST WITHOUT CONTRAST     HISTORY: Left lower lobe opacity demonstrated on chest x-ray.      TECHNIQUE: Chest CT includes axial imaging from the thoracic inlet to  the upper abdomen without IV contrast.     COMPARISON: PA and lateral chest 08/24/2023. No previous chest CT for  comparison.     FINDINGS: There is abnormal increased density and mild bronchiectasis  within the lingula which appears partially collapsed and exhibits  consolidation and this accounts for the radiographic abnormality.  This  needs follow-up to resolution. There are also micronodules in the left  upper lobe and to a lesser degree within the left lower lobe that are  favored to be inflammatory or infectious in etiology. Within the  superior segment left lower lobe there is a 1 cm ground glass  nodular  opacity on image 32. Calcified nodule is present in the right lower  lobe. No endotracheal or central endobronchial lesion is evident. Aortic  atherosclerotic calcifications and coronary arterial calcifications are  present. There is no pleural or pericardial effusion. Imaging through  the upper abdomen appears within normal limits. There is a compression  deformity at T10 that is most likely chronic.       Impression:      1. Lingular consolidation with bronchiectasis accounts for the  radiographic abnormality and this may be inflammatory/infectious in  etiology. There are also small clusters of micronodules in the left  upper lobe and left lower lobe that are favored to be inflammatory or  infectious in etiology. Additionally, there is a 1 cm ground glass  nodular opacity in the superior segment left lower lobe. CT follow-up in  3 months is recommended.  2. Atherosclerotic disease with coronary arterial calcifications.  3. Chronic appearing compression deformity of T10.      Radiation dose reduction techniques were utilized, including automated  exposure control and exposure modulation based on body size.        This report was finalized on 8/24/2023 7:11 PM by Dr. Álvaro Julian M.D.       XR Chest PA & Lateral [[id number]] Collected: 08/24/23 1156     Updated: 08/24/23 1227    Narrative:      Chest radiograph     HISTORY: Hypertension     TECHNIQUE: Two PA and lateral radiographs     COMPARISON: Chest radiograph 8/23/2023       Impression:      FINDINGS AND IMPRESSION:  Nodular pulmonary pacification overlies the left mid to lower lung  measuring approximately 1.9 cm. While findings may represent round  pneumonia, further evaluation with chest CT is recommended to better  characterize and exclude the possibility of neoplasm. No pneumothorax is  seen. Cardiac silhouette is mildly enlarged. There is no pleural  effusion.           This report was finalized on 8/24/2023 12:24 PM by Dr. May  GREER Che.                  Assessment:       Mixed incontinence    Bronchiectasis    Hyperlipidemia    History of CVA (cerebrovascular accident)    S/P angioplasty with stent    Hypertensive emergency    Acute headache    Complication of urinary electronic stimulator device, initial encounter    History of ANTONY infection    Labile blood pressure    Stage II InterStim placement with questionable wound infection and suspected just some some dehiscence.    Plan:     We will take advantage of while she is here to put in our IV cefazolin and then switch her back over to Keflex for home use.  She does have a little bit of intolerance with oral antibiotics and she did request a switch from oral to IV while in house.  I think it is reasonable and I do not think the ID service would have any problems with it.  Nothing needed terms of removal of the InterStim device and we will closely examined while she is in house.  Ideally if she can stay off the spironolactone that would be better for her bladder management but I told her her blood pressure is clearly more important than her bladder issues at this point.    Km Carrillo MD  08/24/23  19:30 EDT

## 2023-08-24 NOTE — CASE MANAGEMENT/SOCIAL WORK
Discharge Planning Assessment  [facility]     Patient Name: Della Vines  MRN: [id number]  Today's Date: 8/24/2023    Admit Date: 8/23/2023    Plan: Home, spouse will transport   Discharge Needs Assessment       Row Name 08/24/23 1148       Living Environment    People in Home spouse    Name(s) of People in Home Tyrone Vines [phone]    Current Living Arrangements home    Potentially Unsafe Housing Conditions none    Primary Care Provided by self    Provides Primary Care For no one    Family Caregiver if Needed spouse    Family Caregiver Names Tyrone Vines    Quality of Family Relationships helpful;involved    Able to Return to Prior Arrangements yes       Resource/Environmental Concerns    Resource/Environmental Concerns none    Transportation Concerns none       Food Insecurity    Within the past 12 months, you worried that your food would run out before you got the money to buy more. Never true    Within the past 12 months, the food you bought just didn't last and you didn't have money to get more. Never true       Transition Planning    Patient/Family Anticipates Transition to home;home with family    Patient/Family Anticipated Services at Transition none    Transportation Anticipated family or friend will provide       Discharge Needs Assessment    Equipment Currently Used at Home none    Concerns to be Addressed no discharge needs identified;denies needs/concerns at this time    Anticipated Changes Related to Illness none    Equipment Needed After Discharge none    Provided Post Acute Provider List? N/A    Provided Post Acute Provider Quality & Resource List? N/A                   Discharge Plan       Row Name 08/24/23 1149       Plan    Plan Home, spouse will transport    Patient/Family in Agreement with Plan yes    Plan Comments Met with pt at bedside. Introduced self and explained role of . Face sheet verified, PCP is Josep Kelley. Pt denies any difficulty paying for medication, and she  obtains her medication from [facility].Pt lives with her spouse, and he can provide any care needs that may arise.  Pt has 3 steps to maneuver to enter her home, and does so without any difficutly. Pt is independent in ADL's and denies the need for any assistive devices. Pt has never used home health or been to rehab. She does not anticipate requiring those services upon discharge. Pt had several questions re her medications, requested the pharmacist to speak to patient and educate pt re her concerns. Upon discharge, pt spouse will transport home. Explained that CCP would follow to assess for discharge needs.                  Continued Care and Services - Admitted Since 8/23/2023    Coordination has not been started for this encounter.       Expected Discharge Date and Time       Expected Discharge Date Expected Discharge Time    Aug 26, 2023            Demographic Summary    No documentation.                  Functional Status    No documentation.                  Psychosocial    No documentation.                  Abuse/Neglect    No documentation.                  Legal    No documentation.                  Substance Abuse    No documentation.                  Patient Forms    No documentation.                     Marbella Dominguez RN

## 2023-08-24 NOTE — PLAN OF CARE
Goal Outcome Evaluation:  Plan of Care Reviewed With: patient           Outcome Evaluation: BP low at times this shift, IVF bolus given x1, BP improved after, HR 40-50's, per pt HR normally runs low, up to [facility] [employer] with small open area to posterior right hip area pt with bandaid on, photo on chart per MD order, oral Keflex started, NP notified pt requesting Probiotic

## 2023-08-24 NOTE — PROGRESS NOTES
Name: Della Vines ADMIT: 2023   : 1945  PCP: Josep Kelley MD    MRN: [id number] LOS: 0 days   AGE/SEX: 77 y.o. female  ROOM: [facility]     Subjective   Subjective   Resting comfortably in bed.  No acute complaints today.  Multiple plan hours at bedside.    Objective   Objective   Vital Signs  Temp:  [97.7 °F (36.5 °C)-97.9 °F (36.6 °C)] 97.9 °F (36.6 °C)  Heart Rate:  [45-65] 59  Resp:  [18] 18  BP: ()/(37-81) 170/54  SpO2:  [95 %-98 %] 97 %  on   ;   Device (Oxygen Therapy): room air  Body mass index is 20.37 kg/m².  Physical Exam  Constitutional:       General: She is not in acute distress.     Appearance: Normal appearance.   Cardiovascular:      Rate and Rhythm: Normal rate.      Pulses: Normal pulses.   Pulmonary:      Effort: Pulmonary effort is normal. No respiratory distress.      Breath sounds: Normal breath sounds.   Abdominal:      General: Abdomen is flat.      Palpations: Abdomen is soft.   Musculoskeletal:      Right lower leg: No edema.      Left lower leg: No edema.   Neurological:      General: No focal deficit present.      Mental Status: She is alert and oriented to person, place, and time.      Cranial Nerves: No cranial nerve deficit.       Results Review     I reviewed the patient's new clinical results.  Results from last 7 days   Lab Units 23  0400 23  1003   WBC 10*3/mm3 4.67 6.64   HEMOGLOBIN g/dL 12.3 14.9   PLATELETS 10*3/mm3 212 246     Results from last 7 days   Lab Units 23  0400 23  1112   SODIUM mmol/L 140 142   POTASSIUM mmol/L 3.8 4.6   CHLORIDE mmol/L 106 106   CO2 mmol/L 27.1 25.0   BUN mg/dL 18 14   CREATININE mg/dL 0.66 0.66   GLUCOSE mg/dL 93 98   EGFR mL/min/1.73 91.0 91.0     Results from last 7 days   Lab Units 23  0400 23  1112   ALBUMIN g/dL 3.7 4.5   BILIRUBIN mg/dL 0.5 0.8   ALK PHOS U/L 56 72   AST (SGOT) U/L 23 37*   ALT (SGPT) U/L 16 23     Results from last 7 days   Lab Units 23  0400  08/23/23  1112   CALCIUM mg/dL 9.1 10.0   ALBUMIN g/dL 3.7 4.5   MAGNESIUM mg/dL 2.1  --        No results found for: HGBA1C, POCGLU    XR Chest 2 View    Result Date: 8/23/2023  1. Mild subtle hazy opacity within the anterior aspect of the lingula. This may represent prominence of the epicardial fat though it is difficult to exclude a subtle infiltrate in this region. Lungs are otherwise clear. 2. Osteopenia. Mild compression deformities within the thoracic spine are most likely chronic.  This report was finalized on 8/23/2023 2:29 PM by Dr. Álvaro Julian M.D.      CT Head Without Contrast    Result Date: 8/23/2023  1. No acute intracranial abnormality is identified with no significant change when compared to prior head CT from [facility] on 07/16/2022. 2. There is mild small vessel disease in the cerebral white matter and calcified plaques in the intracranial segment of the distal right vertebral artery and cavernous segments of the internal carotid arteries bilaterally. 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 The remainder of the head CT is within normal limits.  Radiation dose reduction techniques were utilized, including automated exposure control and exposure modulation based on body size.   This report was finalized on 8/23/2023 12:00 PM by Dr. Yamil Singh M.D.      XR Chest 1 View    Result Date: 8/23/2023  Subtle hazy opacity along the left lower cardiac border. This could represent prominence of epicardial fat though a subtle hazy infiltrate is also in the differential diagnosis. There are no previous studies for comparison. Recommend follow-up with PA and lateral chest.  This report was finalized on 8/23/2023 10:26 AM by Dr. Álvaro Julian M.D.      XR Chest PA & Lateral    Result Date: 8/24/2023  FINDINGS AND IMPRESSION: Nodular pulmonary pacification overlies the left mid to lower  lung measuring approximately 1.9 cm. While findings may represent round pneumonia, further evaluation with chest CT is recommended to better characterize and exclude the possibility of neoplasm. No pneumothorax is seen. Cardiac silhouette is mildly enlarged. There is no pleural effusion.    This report was finalized on 8/24/2023 12:24 PM by Dr. Lalo Che M.D.     Scheduled Medications  aspirin, 81 mg, Oral, Daily  atorvastatin, 40 mg, Oral, Nightly  cephalexin, 500 mg, Oral, 4x Daily  metoprolol succinate XL, 50 mg, Oral, Daily  mupirocin, 1 application , Topical, Q12H  pantoprazole, 40 mg, Oral, QAM  saccharomyces boulardii, 250 mg, Oral, BID  senna-docusate sodium, 2 tablet, Oral, BID  sodium chloride, 10 mL, Intravenous, Q12H  spironolactone, 25 mg, Oral, Daily  valsartan, 160 mg, Oral, Nightly    Infusions   Diet  Diet: Cardiac Diets; Healthy Heart (2-3 Na+); Texture: Regular Texture (IDDSI 7); Fluid Consistency: Thin (IDDSI 0)       Assessment/Plan     Active Hospital Problems    Diagnosis  POA    Hypertensive emergency [I16.1]  Yes    Acute headache [R51.9]  Yes    Complication of urinary electronic stimulator device, initial encounter [T83.9XXA]  Yes    History of ANTONY infection [Z86.19]  Yes    Labile blood pressure [R09.89]  Yes    S/P angioplasty with stent [Z95.820]  Not Applicable    History of CVA (cerebrovascular accident) [Z86.73]  Not Applicable    Hyperlipidemia [E78.5]  Yes    Mixed incontinence [N39.46]  Yes    Bronchiectasis [J47.9]  Yes      Resolved Hospital Problems   No resolved problems to display.       77 y.o. female admitted with <principal problem not specified>.      08/24/23  CT chest pending given abnormal CXR.  Appreciate specialist input.    HTN  Hypertensive emergency, resolved  Bradycardia  -long list of meds with ADEs per history  -Valsartan 160 mg, metoprolol XL 50 mg, spironolactone 25 mg  -Beta-blocker increased limited by bradycardia but agree that we could switch to  Coreg for better blood pressure control     Complication of urinary electronic stimulator device, initial encounter/bladder stimulator   -placed 7 weeks ago/for dehiscence versus seroma versus possible infection  -Urology consulted  -ID following- cont Keflex 500mg 4 times daily for 10d     Abnormal CXR  -Nodular opacification 1.9 cm left mid to lower lung  -sh states she does have a history of prior MAC infection  -CT chest pending     Paroxysmal A-fib  -RC: metoprolol XL 50mg     SCDs for DVT prophylaxis.  Discussed with patient and family.  Anticipate discharge home in 1-2 days      Raymond Wilkins MD  [facility]  08/24/23  15:38 EDT

## 2023-08-25 LAB
ANION GAP SERPL CALCULATED.3IONS-SCNC: 10.1 MMOL/L (ref 5–15)
BASOPHILS # BLD AUTO: 0.05 10*3/MM3 (ref 0–0.2)
BASOPHILS NFR BLD AUTO: 1 % (ref 0–1.5)
BUN SERPL-MCNC: 18 MG/DL (ref 8–23)
BUN/CREAT SERPL: 30.5 (ref 7–25)
CALCIUM SPEC-SCNC: 8.9 MG/DL (ref 8.6–10.5)
CHLORIDE SERPL-SCNC: 108 MMOL/L (ref 98–107)
CO2 SERPL-SCNC: 23.9 MMOL/L (ref 22–29)
CREAT SERPL-MCNC: 0.59 MG/DL (ref 0.57–1)
DEPRECATED RDW RBC AUTO: 39.2 FL (ref 37–54)
EGFRCR SERPLBLD CKD-EPI 2021: 93.5 ML/MIN/1.73
EOSINOPHIL # BLD AUTO: 0.2 10*3/MM3 (ref 0–0.4)
EOSINOPHIL NFR BLD AUTO: 3.9 % (ref 0.3–6.2)
ERYTHROCYTE [DISTWIDTH] IN BLOOD BY AUTOMATED COUNT: 12 % (ref 12.3–15.4)
GLUCOSE SERPL-MCNC: 96 MG/DL (ref 65–99)
HCT VFR BLD AUTO: 37.9 % (ref 34–46.6)
HGB BLD-MCNC: 12.8 G/DL (ref 12–15.9)
IMM GRANULOCYTES # BLD AUTO: 0.01 10*3/MM3 (ref 0–0.05)
IMM GRANULOCYTES NFR BLD AUTO: 0.2 % (ref 0–0.5)
LYMPHOCYTES # BLD AUTO: 1.85 10*3/MM3 (ref 0.7–3.1)
LYMPHOCYTES NFR BLD AUTO: 35.8 % (ref 19.6–45.3)
MCH RBC QN AUTO: 30.3 PG (ref 26.6–33)
MCHC RBC AUTO-ENTMCNC: 33.8 G/DL (ref 31.5–35.7)
MCV RBC AUTO: 89.8 FL (ref 79–97)
MONOCYTES # BLD AUTO: 0.46 10*3/MM3 (ref 0.1–0.9)
MONOCYTES NFR BLD AUTO: 8.9 % (ref 5–12)
NEUTROPHILS NFR BLD AUTO: 2.6 10*3/MM3 (ref 1.7–7)
NEUTROPHILS NFR BLD AUTO: 50.2 % (ref 42.7–76)
NRBC BLD AUTO-RTO: 0 /100 WBC (ref 0–0.2)
PLATELET # BLD AUTO: 226 10*3/MM3 (ref 140–450)
PMV BLD AUTO: 10.7 FL (ref 6–12)
POTASSIUM SERPL-SCNC: 3.7 MMOL/L (ref 3.5–5.2)
RBC # BLD AUTO: 4.22 10*6/MM3 (ref 3.77–5.28)
SODIUM SERPL-SCNC: 142 MMOL/L (ref 136–145)
WBC NRBC COR # BLD: 5.17 10*3/MM3 (ref 3.4–10.8)

## 2023-08-25 PROCEDURE — 25010000002 CEFAZOLIN IN DEXTROSE 2-4 GM/100ML-% SOLUTION: Performed by: UROLOGY

## 2023-08-25 PROCEDURE — 96366 THER/PROPH/DIAG IV INF ADDON: CPT

## 2023-08-25 PROCEDURE — 80048 BASIC METABOLIC PNL TOTAL CA: CPT | Performed by: INTERNAL MEDICINE

## 2023-08-25 PROCEDURE — 99232 SBSQ HOSP IP/OBS MODERATE 35: CPT | Performed by: NURSE PRACTITIONER

## 2023-08-25 PROCEDURE — 25010000002 HYDRALAZINE PER 20 MG: Performed by: INTERNAL MEDICINE

## 2023-08-25 PROCEDURE — G0378 HOSPITAL OBSERVATION PER HR: HCPCS

## 2023-08-25 PROCEDURE — 96376 TX/PRO/DX INJ SAME DRUG ADON: CPT

## 2023-08-25 PROCEDURE — 85025 COMPLETE CBC W/AUTO DIFF WBC: CPT | Performed by: INTERNAL MEDICINE

## 2023-08-25 RX ORDER — AMOXICILLIN 250 MG
2 CAPSULE ORAL 2 TIMES DAILY PRN
Status: DISCONTINUED | OUTPATIENT
Start: 2023-08-25 | End: 2023-08-27 | Stop reason: HOSPADM

## 2023-08-25 RX ORDER — CARVEDILOL 3.12 MG/1
3.12 TABLET ORAL 2 TIMES DAILY WITH MEALS
Status: DISCONTINUED | OUTPATIENT
Start: 2023-08-25 | End: 2023-08-26

## 2023-08-25 RX ORDER — BISACODYL 5 MG/1
5 TABLET, DELAYED RELEASE ORAL DAILY PRN
Status: DISCONTINUED | OUTPATIENT
Start: 2023-08-25 | End: 2023-08-27 | Stop reason: HOSPADM

## 2023-08-25 RX ORDER — POLYETHYLENE GLYCOL 3350 17 G/17G
17 POWDER, FOR SOLUTION ORAL DAILY PRN
Status: DISCONTINUED | OUTPATIENT
Start: 2023-08-25 | End: 2023-08-27 | Stop reason: HOSPADM

## 2023-08-25 RX ORDER — BISACODYL 10 MG
10 SUPPOSITORY, RECTAL RECTAL DAILY PRN
Status: DISCONTINUED | OUTPATIENT
Start: 2023-08-25 | End: 2023-08-27 | Stop reason: HOSPADM

## 2023-08-25 RX ADMIN — VALSARTAN 240 MG: 160 TABLET, FILM COATED ORAL at 20:19

## 2023-08-25 RX ADMIN — ATORVASTATIN CALCIUM 40 MG: 20 TABLET, FILM COATED ORAL at 20:19

## 2023-08-25 RX ADMIN — Medication 250 MG: at 21:33

## 2023-08-25 RX ADMIN — Medication 10 ML: at 20:19

## 2023-08-25 RX ADMIN — Medication 250 MG: at 09:35

## 2023-08-25 RX ADMIN — CEFAZOLIN SODIUM 2000 MG: 2 INJECTION, SOLUTION INTRAVENOUS at 04:24

## 2023-08-25 RX ADMIN — PANTOPRAZOLE SODIUM 40 MG: 40 TABLET, DELAYED RELEASE ORAL at 04:25

## 2023-08-25 RX ADMIN — ACETAMINOPHEN 650 MG: 325 TABLET, FILM COATED ORAL at 21:24

## 2023-08-25 RX ADMIN — MUPIROCIN 1 APPLICATION: 20 OINTMENT TOPICAL at 20:20

## 2023-08-25 RX ADMIN — CARVEDILOL 3.12 MG: 3.12 TABLET, FILM COATED ORAL at 10:25

## 2023-08-25 RX ADMIN — ASPIRIN 81 MG: 81 TABLET, COATED ORAL at 09:36

## 2023-08-25 RX ADMIN — CARVEDILOL 3.12 MG: 3.12 TABLET, FILM COATED ORAL at 18:38

## 2023-08-25 RX ADMIN — Medication 10 ML: at 09:37

## 2023-08-25 RX ADMIN — MUPIROCIN 1 APPLICATION: 20 OINTMENT TOPICAL at 09:37

## 2023-08-25 RX ADMIN — CEFAZOLIN SODIUM 2000 MG: 2 INJECTION, SOLUTION INTRAVENOUS at 20:19

## 2023-08-25 RX ADMIN — HYDRALAZINE HYDROCHLORIDE 10 MG: 20 INJECTION INTRAMUSCULAR; INTRAVENOUS at 20:29

## 2023-08-25 RX ADMIN — ACETAMINOPHEN 650 MG: 325 TABLET, FILM COATED ORAL at 09:34

## 2023-08-25 NOTE — PLAN OF CARE
Goal Outcome Evaluation:  Plan of Care Reviewed With: patient           Outcome Evaluation: VSS, tylenol given for HA once. /80s at 2000, scheduled meds given at 2030 and IV hydralazine given at 2130, then 160/70s at 2150, 140/70s at MN and later rechecks. IV antibiotics given. Pt up to BSC ad aye. Dressing changed. Will CTM, safety maintained.

## 2023-08-25 NOTE — PROGRESS NOTES
Hospital Follow Up    LOS:  LOS: 0 days   Patient Name: Della Vines  Age/Sex: 77 y.o. female  : 1945  MRN: [id number]    Day of Service: 23   Length of Stay: 0  Encounter Provider: WALTER Wang  Place of Service: [facility] CARDIOLOGY  Patient Care Team:  Josep Kelley MD as PCP - General (Internal Medicine)  Kingsley Zimmerman MD as Consulting Physician (Cardiology)    Subjective:     Chief Complaint: HTN    Interval History: No complaints of chest pain or SOA. She currently has a headache. BP did improve yesterday but is elevated once again this morning.    Objective:     Objective:  Temp:  [97.7 °F (36.5 °C)-98.1 °F (36.7 °C)] 98.1 °F (36.7 °C)  Heart Rate:  [50-72] 66  Resp:  [18] 18  BP: (120-216)/(54-87) 187/84     Intake/Output Summary (Last 24 hours) at 2023 0803  Last data filed at 2023  Gross per 24 hour   Intake 240 ml   Output --   Net 240 ml     Body mass index is 20.37 kg/m².      23  1044   Weight: 52.2 kg (115 lb)     Weight change:     Physical Exam:   General Appearance:    Awake alert and oriented in no acute distress.   Color:  Skin:  Neuro:  HEENT:    Lungs:     Pink  Warm and dry  No focal, motor or sensory deficits  Neck supple, pupils equal, round and reactive. No JVD, No Bruit  Clear to auscultation,respirations regular, even and                  unlabored    Heart:    Regular rate and rhythm, S1 and S2, no murmur, no gallop, no rub. No edema, DP/PT pulses are 2+   Chest Wall:    No abnormalities observed   Abdomen:     Normal bowel sounds, no masses, no organomegaly, soft     non-tender, non-distended, no guarding, no ascites noted   Extremities:   Moves all extremities well, no edema, no cyanosis, no redness       Lab Review:   Results from last 7 days   Lab Units 23  0545 23  0400 23  1112   SODIUM mmol/L 142 140 142   POTASSIUM mmol/L 3.7 3.8 4.6   CHLORIDE mmol/L 108* 106 106   CO2 mmol/L  23.9 27.1 25.0   BUN mg/dL 18 18 14   CREATININE mg/dL 0.59 0.66 0.66   GLUCOSE mg/dL 96 93 98   CALCIUM mg/dL 8.9 9.1 10.0   AST (SGOT) U/L  --  23 37*   ALT (SGPT) U/L  --  16 23     Results from last 7 days   Lab Units 08/24/23  0400 08/23/23  1316 08/23/23  1112   HSTROP T ng/L 12* 11* 11*     Results from last 7 days   Lab Units 08/25/23  0545 08/24/23  0400   WBC 10*3/mm3 5.17 4.67   HEMOGLOBIN g/dL 12.8 12.3   HEMATOCRIT % 37.9 37.5   PLATELETS 10*3/mm3 226 212         Results from last 7 days   Lab Units 08/24/23  0400   MAGNESIUM mg/dL 2.1           Invalid input(s): LDLCALC      Results from last 7 days   Lab Units 08/24/23  0400   TSH uIU/mL 2.830     I reviewed the patient's new clinical results.  I personally viewed and interpreted the patient's EKG  Current Medications:   Scheduled Meds:aspirin, 81 mg, Oral, Daily  atorvastatin, 40 mg, Oral, Nightly  carvedilol, 3.125 mg, Oral, BID With Meals  ceFAZolin, 2,000 mg, Intravenous, Q8H  mupirocin, 1 application , Topical, Q12H  pantoprazole, 40 mg, Oral, QAM  saccharomyces boulardii, 250 mg, Oral, BID  senna-docusate sodium, 2 tablet, Oral, BID  sodium chloride, 10 mL, Intravenous, Q12H  spironolactone, 25 mg, Oral, Daily  valsartan, 160 mg, Oral, Nightly      Continuous Infusions:     Allergies:  Allergies   Allergen Reactions    Codeine Hives and Nausea Only    Contrast Dye (Echo Or Unknown Ct/Mr) Hives    Ct Contrast Hives    Erythromycin Diarrhea     Side effect     Gadolinium Derivatives (Mr Contrast) Hives    Iodine Hives    Lisinopril Other (See Comments)     COUGH    Morphine Itching and Delirium    Moxifloxacin      Elevated liver enzymes    Sulfa Antibiotics Hives and Nausea Only    Pneumococcal Vaccines Swelling and Rash       Assessment:       Mixed incontinence    Bronchiectasis    Hyperlipidemia    History of CVA (cerebrovascular accident)    S/P angioplasty with stent    Hypertensive emergency    Acute headache    Complication of urinary  electronic stimulator device, initial encounter    History of ANTONY infection    Labile blood pressure        Plan:   CAD:on aspirin, statin and beta blocker. EKG is stable with no new ischemic changes. No anginal symptoms. Continue with current medical therapy.  HTN:Off Cardene gtt. Was started on spironolactone yesterday but patient declined to take it as she has concerns with being on a diuretic in conjunction with her overactive bladder. Unable to titrate up on carvedilol. Intolerant to amlodipine due to swelling. Will stop spironolactone and try increased dose of valsartan.  HLD: on statin therapy.  CVA: on aspirin and statin.        WALTER Wang  08/25/23  08:03 EDT  Electronically signed by WALTER Wang, 08/25/23, 8:03 AM EDT.

## 2023-08-25 NOTE — CASE MANAGEMENT/SOCIAL WORK
Continued Stay Note  [facility]     Patient Name: Della Vines  MRN: [id number]  Today's Date: 8/25/2023    Admit Date: 8/23/2023    Plan: Home, family will transport   Discharge Plan       Row Name 08/25/23 1330       Plan    Plan Home, family will transport    Plan Comments Met briefly with pt at bedside. Confirmed that pt has no discharge needs, should pt be dc over the weekend.                   Discharge Codes    No documentation.                 Expected Discharge Date and Time       Expected Discharge Date Expected Discharge Time    Aug 28, 2023               Marbella Dominguez, RN

## 2023-08-25 NOTE — PROGRESS NOTES
Name: Della Vines ADMIT: 2023   : 1945  PCP: Josep Kelley MD    MRN: [id number] LOS: 0 days   AGE/SEX: 77 y.o. female  ROOM: [facility]     Subjective   Subjective   Resting comfortably, no new issues today.    Objective   Objective   Vital Signs  Temp:  [97.7 °F (36.5 °C)-98.1 °F (36.7 °C)] 98.1 °F (36.7 °C)  Heart Rate:  [50-72] 66  Resp:  [18] 18  BP: (140-216)/(54-87) 187/84  SpO2:  [96 %-97 %] 96 %  on   ;   Device (Oxygen Therapy): room air  Body mass index is 20.37 kg/m².  Physical Exam  Constitutional:       General: She is not in acute distress.     Appearance: Normal appearance.   Cardiovascular:      Rate and Rhythm: Normal rate.      Pulses: Normal pulses.   Pulmonary:      Effort: Pulmonary effort is normal. No respiratory distress.      Breath sounds: Normal breath sounds.   Abdominal:      General: Abdomen is flat.      Palpations: Abdomen is soft.   Musculoskeletal:      Right lower leg: No edema.      Left lower leg: No edema.   Neurological:      General: No focal deficit present.      Mental Status: She is alert and oriented to person, place, and time.      Cranial Nerves: No cranial nerve deficit.       Results Review     I reviewed the patient's new clinical results.  Results from last 7 days   Lab Units 23  0545 23  0400 23  1003   WBC 10*3/mm3 5.17 4.67 6.64   HEMOGLOBIN g/dL 12.8 12.3 14.9   PLATELETS 10*3/mm3 226 212 246     Results from last 7 days   Lab Units 23  0545 23  0400 23  1112   SODIUM mmol/L 142 140 142   POTASSIUM mmol/L 3.7 3.8 4.6   CHLORIDE mmol/L 108* 106 106   CO2 mmol/L 23.9 27.1 25.0   BUN mg/dL 18 18 14   CREATININE mg/dL 0.59 0.66 0.66   GLUCOSE mg/dL 96 93 98   EGFR mL/min/1.73 93.5 91.0 91.0     Results from last 7 days   Lab Units 23  0400 23  1112   ALBUMIN g/dL 3.7 4.5   BILIRUBIN mg/dL 0.5 0.8   ALK PHOS U/L 56 72   AST (SGOT) U/L 23 37*   ALT (SGPT) U/L 16 23     Results from last 7 days   Lab  Units 08/25/23  0545 08/24/23  0400 08/23/23  1112   CALCIUM mg/dL 8.9 9.1 10.0   ALBUMIN g/dL  --  3.7 4.5   MAGNESIUM mg/dL  --  2.1  --        No results found for: HGBA1C, POCGLU    XR Chest 2 View    Result Date: 8/23/2023  1. Mild subtle hazy opacity within the anterior aspect of the lingula. This may represent prominence of the epicardial fat though it is difficult to exclude a subtle infiltrate in this region. Lungs are otherwise clear. 2. Osteopenia. Mild compression deformities within the thoracic spine are most likely chronic.  This report was finalized on 8/23/2023 2:29 PM by Dr. Álvaro Julian M.D.      CT Chest Without Contrast Diagnostic    Result Date: 8/24/2023  1. Lingular consolidation with bronchiectasis accounts for the radiographic abnormality and this may be inflammatory/infectious in etiology. There are also small clusters of micronodules in the left upper lobe and left lower lobe that are favored to be inflammatory or infectious in etiology. Additionally, there is a 1 cm ground glass nodular opacity in the superior segment left lower lobe. CT follow-up in 3 months is recommended. 2. Atherosclerotic disease with coronary arterial calcifications. 3. Chronic appearing compression deformity of T10.  Radiation dose reduction techniques were utilized, including automated exposure control and exposure modulation based on body size.   This report was finalized on 8/24/2023 7:11 PM by Dr. Álvaro Julian M.D.      XR Chest PA & Lateral    Result Date: 8/24/2023  FINDINGS AND IMPRESSION: Nodular pulmonary pacification overlies the left mid to lower lung measuring approximately 1.9 cm. While findings may represent round pneumonia, further evaluation with chest CT is recommended to better characterize and exclude the possibility of neoplasm. No pneumothorax is seen. Cardiac silhouette is mildly enlarged. There is no pleural effusion.    This report was finalized on 8/24/2023 12:24 PM by Dr. May  ZOE Che     Scheduled Medications  aspirin, 81 mg, Oral, Daily  atorvastatin, 40 mg, Oral, Nightly  carvedilol, 3.125 mg, Oral, BID With Meals  ceFAZolin, 2,000 mg, Intravenous, Q8H  mupirocin, 1 application , Topical, Q12H  pantoprazole, 40 mg, Oral, QAM  saccharomyces boulardii, 250 mg, Oral, BID  sodium chloride, 10 mL, Intravenous, Q12H  valsartan, 240 mg, Oral, Nightly    Infusions   Diet  Diet: Cardiac Diets; Healthy Heart (2-3 Na+); Texture: Regular Texture (IDDSI 7); Fluid Consistency: Thin (IDDSI 0)       Assessment/Plan     Active Hospital Problems    Diagnosis  POA    Hypertensive emergency [I16.1]  Yes    Acute headache [R51.9]  Yes    Complication of urinary electronic stimulator device, initial encounter [T83.9XXA]  Yes    History of ANTONY infection [Z86.19]  Yes    Labile blood pressure [R09.89]  Yes    S/P angioplasty with stent [Z95.820]  Not Applicable    History of CVA (cerebrovascular accident) [Z86.73]  Not Applicable    Hyperlipidemia [E78.5]  Yes    Mixed incontinence [N39.46]  Yes    Bronchiectasis [J47.9]  Yes      Resolved Hospital Problems   No resolved problems to display.       77 y.o. female admitted with <principal problem not specified>.      08/25/23  Switch metoprolol to Coreg.  Valsartan increased by cardiology.  Plan to monitor BP today, possible DC tomorrow.    HTN  Hypertensive emergency, resolved  Bradycardia  -long list of meds with ADEs per history  -Spironolactone was intended to be started but may worsen patient's known voiding pattern.  Noted that her hypertension was mainly in the evening yesterday prior to dose of ARB.  Potentially having an agent on board throughout the day may help this  -Beta-blocker increased limited by bradycardia; plan to switch to Coreg 3.125 mg twice daily  -Valsartan increased to 240mg     Complication of urinary electronic stimulator device, initial encounter/bladder stimulator   -placed 7 weeks ago/for dehiscence versus seroma versus  possible infection  -Urology evaluated  -ID following- cont Keflex 500mg 4 times daily for 10d (cefazolin while in hospital per Urology)     Abnormal CXR  -Nodular opacification 1.9 cm left mid to lower lung  -sh states she does have a history of prior MAC infection  -CT chest w/ lingular consolidation with bronchiectasis, 1 cm GGO in LLL (repeat CT chest in 3 months, around 11/25/2023)     Paroxysmal A-fib  -RC: Coreg 3.125mg BID     SCDs for DVT prophylaxis.  Discussed with patient and family.  Anticipate discharge home in 1-2 days      Raymond Wilkins MD  [facility]  08/25/23  11:19 EDT

## 2023-08-26 LAB
ANION GAP SERPL CALCULATED.3IONS-SCNC: 10 MMOL/L (ref 5–15)
BASOPHILS # BLD AUTO: 0.04 10*3/MM3 (ref 0–0.2)
BASOPHILS NFR BLD AUTO: 0.6 % (ref 0–1.5)
BUN SERPL-MCNC: 15 MG/DL (ref 8–23)
BUN/CREAT SERPL: 25.4 (ref 7–25)
CALCIUM SPEC-SCNC: 8.9 MG/DL (ref 8.6–10.5)
CHLORIDE SERPL-SCNC: 109 MMOL/L (ref 98–107)
CO2 SERPL-SCNC: 23 MMOL/L (ref 22–29)
CREAT SERPL-MCNC: 0.59 MG/DL (ref 0.57–1)
DEPRECATED RDW RBC AUTO: 40.3 FL (ref 37–54)
EGFRCR SERPLBLD CKD-EPI 2021: 93.5 ML/MIN/1.73
EOSINOPHIL # BLD AUTO: 0.17 10*3/MM3 (ref 0–0.4)
EOSINOPHIL NFR BLD AUTO: 2.6 % (ref 0.3–6.2)
ERYTHROCYTE [DISTWIDTH] IN BLOOD BY AUTOMATED COUNT: 12.1 % (ref 12.3–15.4)
GLUCOSE SERPL-MCNC: 93 MG/DL (ref 65–99)
HCT VFR BLD AUTO: 38 % (ref 34–46.6)
HGB BLD-MCNC: 12.9 G/DL (ref 12–15.9)
IMM GRANULOCYTES # BLD AUTO: 0.02 10*3/MM3 (ref 0–0.05)
IMM GRANULOCYTES NFR BLD AUTO: 0.3 % (ref 0–0.5)
LYMPHOCYTES # BLD AUTO: 2.07 10*3/MM3 (ref 0.7–3.1)
LYMPHOCYTES NFR BLD AUTO: 31.5 % (ref 19.6–45.3)
MCH RBC QN AUTO: 30.4 PG (ref 26.6–33)
MCHC RBC AUTO-ENTMCNC: 33.9 G/DL (ref 31.5–35.7)
MCV RBC AUTO: 89.6 FL (ref 79–97)
MONOCYTES # BLD AUTO: 0.5 10*3/MM3 (ref 0.1–0.9)
MONOCYTES NFR BLD AUTO: 7.6 % (ref 5–12)
NEUTROPHILS NFR BLD AUTO: 3.78 10*3/MM3 (ref 1.7–7)
NEUTROPHILS NFR BLD AUTO: 57.4 % (ref 42.7–76)
NRBC BLD AUTO-RTO: 0 /100 WBC (ref 0–0.2)
PLATELET # BLD AUTO: 240 10*3/MM3 (ref 140–450)
PMV BLD AUTO: 10.4 FL (ref 6–12)
POTASSIUM SERPL-SCNC: 3.9 MMOL/L (ref 3.5–5.2)
RBC # BLD AUTO: 4.24 10*6/MM3 (ref 3.77–5.28)
SODIUM SERPL-SCNC: 142 MMOL/L (ref 136–145)
WBC NRBC COR # BLD: 6.58 10*3/MM3 (ref 3.4–10.8)

## 2023-08-26 PROCEDURE — 99232 SBSQ HOSP IP/OBS MODERATE 35: CPT | Performed by: NURSE PRACTITIONER

## 2023-08-26 PROCEDURE — G0378 HOSPITAL OBSERVATION PER HR: HCPCS

## 2023-08-26 PROCEDURE — 85025 COMPLETE CBC W/AUTO DIFF WBC: CPT | Performed by: INTERNAL MEDICINE

## 2023-08-26 PROCEDURE — 80048 BASIC METABOLIC PNL TOTAL CA: CPT | Performed by: INTERNAL MEDICINE

## 2023-08-26 PROCEDURE — 25010000002 CEFAZOLIN IN DEXTROSE 2-4 GM/100ML-% SOLUTION: Performed by: UROLOGY

## 2023-08-26 PROCEDURE — 96366 THER/PROPH/DIAG IV INF ADDON: CPT

## 2023-08-26 RX ORDER — CARVEDILOL 12.5 MG/1
12.5 TABLET ORAL 2 TIMES DAILY WITH MEALS
Status: DISCONTINUED | OUTPATIENT
Start: 2023-08-26 | End: 2023-08-27

## 2023-08-26 RX ORDER — CARVEDILOL 6.25 MG/1
6.25 TABLET ORAL ONCE
Status: COMPLETED | OUTPATIENT
Start: 2023-08-26 | End: 2023-08-26

## 2023-08-26 RX ORDER — CARVEDILOL 3.12 MG/1
3.12 TABLET ORAL 2 TIMES DAILY WITH MEALS
Status: DISCONTINUED | OUTPATIENT
Start: 2023-08-26 | End: 2023-08-26 | Stop reason: SDUPTHER

## 2023-08-26 RX ORDER — HYDRALAZINE HYDROCHLORIDE 10 MG/1
10 TABLET, FILM COATED ORAL EVERY 6 HOURS PRN
Status: DISCONTINUED | OUTPATIENT
Start: 2023-08-26 | End: 2023-08-27 | Stop reason: HOSPADM

## 2023-08-26 RX ADMIN — CARVEDILOL 3.12 MG: 3.12 TABLET, FILM COATED ORAL at 09:37

## 2023-08-26 RX ADMIN — MUPIROCIN 1 APPLICATION: 20 OINTMENT TOPICAL at 21:19

## 2023-08-26 RX ADMIN — VALSARTAN 240 MG: 160 TABLET, FILM COATED ORAL at 21:18

## 2023-08-26 RX ADMIN — CARVEDILOL 6.25 MG: 6.25 TABLET, FILM COATED ORAL at 14:00

## 2023-08-26 RX ADMIN — Medication 10 ML: at 21:19

## 2023-08-26 RX ADMIN — ASPIRIN 81 MG: 81 TABLET, COATED ORAL at 09:36

## 2023-08-26 RX ADMIN — Medication 10 ML: at 11:23

## 2023-08-26 RX ADMIN — CARVEDILOL 12.5 MG: 12.5 TABLET, FILM COATED ORAL at 21:18

## 2023-08-26 RX ADMIN — ACETAMINOPHEN 650 MG: 325 TABLET, FILM COATED ORAL at 11:21

## 2023-08-26 RX ADMIN — PANTOPRAZOLE SODIUM 40 MG: 40 TABLET, DELAYED RELEASE ORAL at 07:25

## 2023-08-26 RX ADMIN — Medication 250 MG: at 21:18

## 2023-08-26 RX ADMIN — CEFAZOLIN SODIUM 2000 MG: 2 INJECTION, SOLUTION INTRAVENOUS at 04:55

## 2023-08-26 RX ADMIN — ATORVASTATIN CALCIUM 40 MG: 20 TABLET, FILM COATED ORAL at 21:17

## 2023-08-26 RX ADMIN — MUPIROCIN 1 APPLICATION: 20 OINTMENT TOPICAL at 11:23

## 2023-08-26 RX ADMIN — Medication 250 MG: at 09:36

## 2023-08-26 RX ADMIN — CEFAZOLIN SODIUM 2000 MG: 2 INJECTION, SOLUTION INTRAVENOUS at 14:06

## 2023-08-26 NOTE — PLAN OF CARE
Goal Outcome Evaluation:            B/P IMPROVED AFTER INCREASED DOSE OF VALSARTAN AND IV HYDRALAZINE X 1.

## 2023-08-26 NOTE — PROGRESS NOTES
HOSPITAL FOLLOW UP NOTE    Patient Name: Della Vines  Patient : 1945        Date of Service:23  Provider of Service: WALTER Cordoba  Place of Service: [facility]  Referral Provider: Pili Vargas,*          Follow Up: hypertension    Interval Hx: remains hypertensive, euvolemic, has a lot of questions        OBJECTIVE  Temp:  [97.5 °F (36.4 °C)-97.9 °F (36.6 °C)] 97.5 °F (36.4 °C)  Heart Rate:  [56-74] 74  Resp:  [18] 18  BP: (139-241)/(63-80) 167/67     Intake/Output Summary (Last 24 hours) at 2023 1026  Last data filed at 2023 0906  Gross per 24 hour   Intake 440 ml   Output --   Net 440 ml     Body mass index is 20.37 kg/m².      23  1044   Weight: 52.2 kg (115 lb)         Physical Exam:     General Appearance:    Alert, cooperative, in no acute distress   Head:    Normocephalic, without obvious abnormality, atraumatic   Eyes:            Conjunctivae and sclerae normal, no   icterus, no pallor, corneas clear, PERRLA   Neck:   No adenopathy, supple, trachea midline, no thyromegaly, no   carotid bruit, no JVD   Lungs:     Clear to auscultation,respirations regular, even and unlabored    Heart:    Regular rhythm and normal rate, normal S1 and S2, no murmur, no gallop, no rub, no click   Chest Wall:    No abnormalities observed   Abdomen:     Normal bowel sounds, no masses, no organomegaly, soft, nontender, nondistended, no guarding, no rebound  tenderness   Extremities:   Moves all extremities well, no edema, no cyanosis, no redness   Pulses:   Pulses palpable and equal bilaterally.          CURRENT MEDS    Scheduled Meds:aspirin, 81 mg, Oral, Daily  atorvastatin, 40 mg, Oral, Nightly  carvedilol, 3.125 mg, Oral, BID With Meals  ceFAZolin, 2,000 mg, Intravenous, Q8H  mupirocin, 1 application , Topical, Q12H  pantoprazole, 40 mg, Oral, QAM  saccharomyces boulardii, 250 mg, Oral, BID  sodium chloride, 10 mL, Intravenous, Q12H  valsartan, 240 mg,  Oral, Nightly      Continuous Infusions:       Lab Review:   Results from last 7 days   Lab Units 08/26/23  0346 08/25/23  0545 08/24/23  0400 08/23/23  1112   SODIUM mmol/L 142 142 140 142   POTASSIUM mmol/L 3.9 3.7 3.8 4.6   CHLORIDE mmol/L 109* 108* 106 106   CO2 mmol/L 23.0 23.9 27.1 25.0   BUN mg/dL 15 18 18 14   CREATININE mg/dL 0.59 0.59 0.66 0.66   GLUCOSE mg/dL 93 96 93 98   CALCIUM mg/dL 8.9 8.9 9.1 10.0   AST (SGOT) U/L  --   --  23 37*   ALT (SGPT) U/L  --   --  16 23         Results from last 7 days   Lab Units 08/26/23  0346 08/25/23  0545   WBC 10*3/mm3 6.58 5.17   HEMOGLOBIN g/dL 12.9 12.8   HEMATOCRIT % 38.0 37.9   PLATELETS 10*3/mm3 240 226         Results from last 7 days   Lab Units 08/24/23  0400   MAGNESIUM mg/dL 2.1             Results from last 7 days   Lab Units 08/24/23  0400   TSH uIU/mL 2.830           ASSESSMENT & PLAN    Hypertensive emergency    Mixed incontinence    Bronchiectasis    Hyperlipidemia    History of CVA (cerebrovascular accident)    S/P angioplasty with stent    Acute headache    Complication of urinary electronic stimulator device, initial encounter    History of ANTONY infection    Labile blood pressure    1.  Hypertension: Previously on Cardene drip.  Refused spironolactone due to being on a diuretic conjunction with her overactive bladder.  Intolerant to amlodipine due to swelling.  Increased valsartan to 240 mg nightly yesterday. BP continues to be elevated.  Increase coreg today to 12.5mg BID. If BP becomes soft, will decrease valsartan back to original dose.   2.  Coronary artery disease: On aspirin, statin and beta-blocker.  No new ischemic changes on EKG.  3.  Hyperlipidemia: On statin therapy  4.  CVA: On aspirin and statin.      Mabel Singh, APRN  08/26/23

## 2023-08-26 NOTE — PROGRESS NOTES
Name: Della Vines ADMIT: 2023   : 1945  PCP: Josep Kelley MD    MRN: [id number] LOS: 0 days   AGE/SEX: 77 y.o. female  ROOM: [facility]     Subjective   Subjective   Resting comfortably in bed.  Her blood pressure spiked up earlier.  Still has been pretty labile.  She has no headache or other symptoms.  Objective   Objective   Vital Signs  Temp:  [97.5 °F (36.4 °C)-98.1 °F (36.7 °C)] 98.1 °F (36.7 °C)  Heart Rate:  [56-74] 74  Resp:  [16-18] 16  BP: (106-241)/(48-80) 106/48  SpO2:  [96 %-99 %] 97 %  on   ;   Device (Oxygen Therapy): room air  Body mass index is 20.37 kg/m².  Physical Exam  Constitutional:       General: She is not in acute distress.     Appearance: Normal appearance.   Cardiovascular:      Rate and Rhythm: Normal rate.      Pulses: Normal pulses.   Pulmonary:      Effort: Pulmonary effort is normal. No respiratory distress.      Breath sounds: Normal breath sounds.   Abdominal:      General: Abdomen is flat.      Palpations: Abdomen is soft.   Musculoskeletal:      Right lower leg: No edema.      Left lower leg: No edema.   Neurological:      General: No focal deficit present.      Mental Status: She is alert and oriented to person, place, and time.      Cranial Nerves: No cranial nerve deficit.       Results Review     I reviewed the patient's new clinical results.  Results from last 7 days   Lab Units 23  0346 23  0545 23  0400 23  1003   WBC 10*3/mm3 6.58 5.17 4.67 6.64   HEMOGLOBIN g/dL 12.9 12.8 12.3 14.9   PLATELETS 10*3/mm3 240 226 212 246     Results from last 7 days   Lab Units 23  0346 23  0545 23  0400 23  1112   SODIUM mmol/L 142 142 140 142   POTASSIUM mmol/L 3.9 3.7 3.8 4.6   CHLORIDE mmol/L 109* 108* 106 106   CO2 mmol/L 23.0 23.9 27.1 25.0   BUN mg/dL 15 18 18 14   CREATININE mg/dL 0.59 0.59 0.66 0.66   GLUCOSE mg/dL 93 96 93 98   EGFR mL/min/1.73 93.5 93.5 91.0 91.0     Results from last 7 days   Lab Units  08/24/23  0400 08/23/23  1112   ALBUMIN g/dL 3.7 4.5   BILIRUBIN mg/dL 0.5 0.8   ALK PHOS U/L 56 72   AST (SGOT) U/L 23 37*   ALT (SGPT) U/L 16 23     Results from last 7 days   Lab Units 08/26/23  0346 08/25/23  0545 08/24/23  0400 08/23/23  1112   CALCIUM mg/dL 8.9 8.9 9.1 10.0   ALBUMIN g/dL  --   --  3.7 4.5   MAGNESIUM mg/dL  --   --  2.1  --        No results found for: HGBA1C, POCGLU    CT Chest Without Contrast Diagnostic    Result Date: 8/24/2023  1. Lingular consolidation with bronchiectasis accounts for the radiographic abnormality and this may be inflammatory/infectious in etiology. There are also small clusters of micronodules in the left upper lobe and left lower lobe that are favored to be inflammatory or infectious in etiology. Additionally, there is a 1 cm ground glass nodular opacity in the superior segment left lower lobe. CT follow-up in 3 months is recommended. 2. Atherosclerotic disease with coronary arterial calcifications. 3. Chronic appearing compression deformity of T10.  Radiation dose reduction techniques were utilized, including automated exposure control and exposure modulation based on body size.   This report was finalized on 8/24/2023 7:11 PM by Dr. Álvaro Julian M.D.     Scheduled Medications  aspirin, 81 mg, Oral, Daily  atorvastatin, 40 mg, Oral, Nightly  carvedilol, 12.5 mg, Oral, BID With Meals  carvedilol, 3.125 mg, Oral, BID With Meals  carvedilol, 6.25 mg, Oral, Once  ceFAZolin, 2,000 mg, Intravenous, Q8H  mupirocin, 1 application , Topical, Q12H  pantoprazole, 40 mg, Oral, QAM  saccharomyces boulardii, 250 mg, Oral, BID  sodium chloride, 10 mL, Intravenous, Q12H  valsartan, 240 mg, Oral, Nightly    Infusions   Diet  Diet: Cardiac Diets; Healthy Heart (2-3 Na+); Texture: Regular Texture (IDDSI 7); Fluid Consistency: Thin (IDDSI 0)       Assessment/Plan     Active Hospital Problems    Diagnosis  POA    **Hypertensive emergency [I16.1]  Yes    Acute headache [R51.9]  Yes     Complication of urinary electronic stimulator device, initial encounter [T83.9XXA]  Yes    History of ANTONY infection [Z86.19]  Yes    Labile blood pressure [R09.89]  Yes    S/P angioplasty with stent [Z95.820]  Not Applicable    History of CVA (cerebrovascular accident) [Z86.73]  Not Applicable    Hyperlipidemia [E78.5]  Yes    Mixed incontinence [N39.46]  Yes    Bronchiectasis [J47.9]  Yes      Resolved Hospital Problems   No resolved problems to display.       77 y.o. female admitted with Hypertensive emergency.      08/26/23  Increase Coreg.. Has continued to require PRN hydralazine IV, would like to see some stabilization prior to dc.    HTN  Hypertensive emergency, resolved  Bradycardia  -long list of meds with ADEs per history  -Spironolactone was intended to be started but may worsen patient's known voiding pattern.  Noted that her hypertension was mainly in the evening yesterday prior to dose of ARB.  Potentially having an agent on board throughout the day may help this  -Coreg 12.5 mg twice daily  -Valsartan increased to 240mg     Complication of urinary electronic stimulator device, initial encounter/bladder stimulator   -placed 7 weeks ago/for dehiscence versus seroma versus possible infection  -Urology evaluated  -ID following- cont Keflex 500mg 4 times daily for 10d (cefazolin while in hospital per Urology)     Abnormal CXR  -Nodular opacification 1.9 cm left mid to lower lung  -sh states she does have a history of prior MAC infection  -CT chest w/ lingular consolidation with bronchiectasis, 1 cm GGO in LLL (repeat CT chest in 3 months, around 11/25/2023)     Paroxysmal A-fib  -RC: Coreg 12.5 mg twice daily     SCDs for DVT prophylaxis.  Discussed with patient and family.  Anticipate discharge home in 1-2 days      Raymond Wilkins MD  [facility]  08/26/23  14:00 EDT

## 2023-08-27 VITALS
BODY MASS INDEX: 20.38 KG/M2 | TEMPERATURE: 98.4 F | OXYGEN SATURATION: 96 % | DIASTOLIC BLOOD PRESSURE: 60 MMHG | HEIGHT: 63 IN | RESPIRATION RATE: 16 BRPM | WEIGHT: 115 LBS | SYSTOLIC BLOOD PRESSURE: 153 MMHG | HEART RATE: 73 BPM

## 2023-08-27 PROBLEM — I16.1 HYPERTENSIVE EMERGENCY: Status: RESOLVED | Noted: 2023-08-23 | Resolved: 2023-08-27

## 2023-08-27 PROBLEM — R51.9 ACUTE HEADACHE: Status: RESOLVED | Noted: 2023-08-23 | Resolved: 2023-08-27

## 2023-08-27 LAB
ANION GAP SERPL CALCULATED.3IONS-SCNC: 9.5 MMOL/L (ref 5–15)
BUN SERPL-MCNC: 16 MG/DL (ref 8–23)
BUN/CREAT SERPL: 24.6 (ref 7–25)
CALCIUM SPEC-SCNC: 9.2 MG/DL (ref 8.6–10.5)
CHLORIDE SERPL-SCNC: 109 MMOL/L (ref 98–107)
CO2 SERPL-SCNC: 24.5 MMOL/L (ref 22–29)
CREAT SERPL-MCNC: 0.65 MG/DL (ref 0.57–1)
EGFRCR SERPLBLD CKD-EPI 2021: 91.4 ML/MIN/1.73
GLUCOSE SERPL-MCNC: 89 MG/DL (ref 65–99)
POTASSIUM SERPL-SCNC: 4.1 MMOL/L (ref 3.5–5.2)
SODIUM SERPL-SCNC: 143 MMOL/L (ref 136–145)

## 2023-08-27 PROCEDURE — 99232 SBSQ HOSP IP/OBS MODERATE 35: CPT | Performed by: NURSE PRACTITIONER

## 2023-08-27 PROCEDURE — G0378 HOSPITAL OBSERVATION PER HR: HCPCS

## 2023-08-27 PROCEDURE — 80048 BASIC METABOLIC PNL TOTAL CA: CPT | Performed by: INTERNAL MEDICINE

## 2023-08-27 RX ORDER — HYDRALAZINE HYDROCHLORIDE 10 MG/1
10 TABLET, FILM COATED ORAL EVERY 6 HOURS PRN
Qty: 30 TABLET | Refills: 0 | Status: SHIPPED | OUTPATIENT
Start: 2023-08-27

## 2023-08-27 RX ORDER — CEPHALEXIN 500 MG/1
500 CAPSULE ORAL 4 TIMES DAILY
Qty: 20 CAPSULE | Refills: 0 | Status: SHIPPED | OUTPATIENT
Start: 2023-08-27 | End: 2023-09-01

## 2023-08-27 RX ORDER — SACCHAROMYCES BOULARDII 250 MG
250 CAPSULE ORAL 2 TIMES DAILY
Qty: 60 CAPSULE | Refills: 0 | Status: SHIPPED | OUTPATIENT
Start: 2023-08-27

## 2023-08-27 RX ORDER — CARVEDILOL 12.5 MG/1
12.5 TABLET ORAL 2 TIMES DAILY WITH MEALS
Qty: 60 TABLET | Refills: 0 | Status: SHIPPED | OUTPATIENT
Start: 2023-08-27

## 2023-08-27 RX ORDER — VALSARTAN 160 MG/1
240 TABLET ORAL NIGHTLY
Qty: 45 TABLET | Refills: 0 | Status: SHIPPED | OUTPATIENT
Start: 2023-08-27

## 2023-08-27 RX ORDER — CARVEDILOL 25 MG/1
25 TABLET ORAL 2 TIMES DAILY WITH MEALS
Status: DISCONTINUED | OUTPATIENT
Start: 2023-08-27 | End: 2023-08-27 | Stop reason: HOSPADM

## 2023-08-27 RX ADMIN — CARVEDILOL 12.5 MG: 12.5 TABLET, FILM COATED ORAL at 08:31

## 2023-08-27 RX ADMIN — ACETAMINOPHEN 650 MG: 325 TABLET, FILM COATED ORAL at 08:30

## 2023-08-27 RX ADMIN — Medication 250 MG: at 08:31

## 2023-08-27 RX ADMIN — PANTOPRAZOLE SODIUM 40 MG: 40 TABLET, DELAYED RELEASE ORAL at 07:52

## 2023-08-27 RX ADMIN — ASPIRIN 81 MG: 81 TABLET, COATED ORAL at 08:30

## 2023-08-27 RX ADMIN — MUPIROCIN 1 APPLICATION: 20 OINTMENT TOPICAL at 08:32

## 2023-08-27 NOTE — CASE MANAGEMENT/SOCIAL WORK
Case Management Discharge Note      Final Note: dc home    Provided Post Acute Provider List?: N/A  Provided Post Acute Provider Quality & Resource List?: N/A    Selected Continued Care - Discharged on 8/27/2023 Admission date: 8/23/2023 - Discharge disposition: Home or Self Care      Destination    No services have been selected for the patient.                Durable Medical Equipment    No services have been selected for the patient.                Dialysis/Infusion    No services have been selected for the patient.                Home Medical Care    No services have been selected for the patient.                Therapy    No services have been selected for the patient.                Community Resources    No services have been selected for the patient.                Community & DME    No services have been selected for the patient.                         Final Discharge Disposition Code: 01 - home or self-care

## 2023-08-27 NOTE — PLAN OF CARE
Goal Outcome Evaluation:              Outcome Evaluation: B/P STABLE. RESTED WELL OVERNIGHT WITHOUT C//O'S.

## 2023-08-27 NOTE — PROGRESS NOTES
HOSPITAL FOLLOW UP NOTE    Patient Name: Della Vines  Patient : 1945        Date of Service:23  Provider of Service: WALTER Cordoba  Place of Service: [facility]  Referral Provider: Pili Vargas,*          Follow Up: hypertension    Interval Hx: remains hypertensive yet a little better, euvolemic    OBJECTIVE  Temp:  [98.1 °F (36.7 °C)-98.4 °F (36.9 °C)] 98.4 °F (36.9 °C)  Heart Rate:  [60-73] 73  Resp:  [16-18] 16  BP: (106-198)/() 153/60     Intake/Output Summary (Last 24 hours) at 2023 0919  Last data filed at 2023 0836  Gross per 24 hour   Intake 240 ml   Output --   Net 240 ml     Body mass index is 20.37 kg/m².      23  1044   Weight: 52.2 kg (115 lb)         Physical Exam:     General Appearance:    Alert, cooperative, in no acute distress   Head:    Normocephalic, without obvious abnormality, atraumatic   Eyes:            Conjunctivae and sclerae normal, no   icterus, no pallor, corneas clear, PERRLA   Neck:   No adenopathy, supple, trachea midline, no thyromegaly, no   carotid bruit, no JVD   Lungs:     Clear to auscultation,respirations regular, even and unlabored    Heart:    Regular rhythm and normal rate, normal S1 and S2, no murmur, no gallop, no rub, no click   Chest Wall:    No abnormalities observed   Abdomen:     Normal bowel sounds, no masses, no organomegaly, soft, nontender, nondistended, no guarding, no rebound  tenderness   Extremities:   Moves all extremities well, no edema, no cyanosis, no redness   Pulses:   Pulses palpable and equal bilaterally.          CURRENT MEDS    Scheduled Meds:aspirin, 81 mg, Oral, Daily  atorvastatin, 40 mg, Oral, Nightly  carvedilol, 12.5 mg, Oral, BID With Meals  mupirocin, 1 application , Topical, Q12H  pantoprazole, 40 mg, Oral, QAM  saccharomyces boulardii, 250 mg, Oral, BID  sodium chloride, 10 mL, Intravenous, Q12H  valsartan, 240 mg, Oral, Nightly      Continuous Infusions:        Lab Review:   Results from last 7 days   Lab Units 08/27/23  0448 08/26/23  0346 08/25/23  0545 08/24/23  0400 08/23/23  1112   SODIUM mmol/L 143 142   < > 140 142   POTASSIUM mmol/L 4.1 3.9   < > 3.8 4.6   CHLORIDE mmol/L 109* 109*   < > 106 106   CO2 mmol/L 24.5 23.0   < > 27.1 25.0   BUN mg/dL 16 15   < > 18 14   CREATININE mg/dL 0.65 0.59   < > 0.66 0.66   GLUCOSE mg/dL 89 93   < > 93 98   CALCIUM mg/dL 9.2 8.9   < > 9.1 10.0   AST (SGOT) U/L  --   --   --  23 37*   ALT (SGPT) U/L  --   --   --  16 23    < > = values in this interval not displayed.         Results from last 7 days   Lab Units 08/26/23  0346 08/25/23  0545   WBC 10*3/mm3 6.58 5.17   HEMOGLOBIN g/dL 12.9 12.8   HEMATOCRIT % 38.0 37.9   PLATELETS 10*3/mm3 240 226         Results from last 7 days   Lab Units 08/24/23  0400   MAGNESIUM mg/dL 2.1             Results from last 7 days   Lab Units 08/24/23  0400   TSH uIU/mL 2.830           ASSESSMENT & PLAN    Mixed incontinence    Bronchiectasis    Hyperlipidemia    History of CVA (cerebrovascular accident)    S/P angioplasty with stent    Complication of urinary electronic stimulator device, initial encounter    History of ANTONY infection    Labile blood pressure    1.  Hypertension: Previously on Cardene drip.  Refused spironolactone due to being on a diuretic conjunction with her overactive bladder.  Intolerant to amlodipine due to swelling.  Increased valsartan to 240 mg nightly yesterday. BP continues to be elevated, yet improving.   2.  Coronary artery disease: On aspirin, statin and beta-blocker.  No new ischemic changes on EKG.  3.  Hyperlipidemia: On statin therapy  4.  CVA: On aspirin and statin.    Patient will start monitoring BP on a normal basis at home and bring in log to follow up apptmt.     Mabel Singh, WALTER  08/27/23

## 2023-08-27 NOTE — DISCHARGE SUMMARY
Patient Name: Della Vines  : 1945  MRN: [id number]    Date of Admission: 2023  Date of Discharge:  2023  Primary Care Physician: Josep Kelley MD      Chief Complaint:   Hypertension      Discharge Diagnoses     Active Hospital Problems    Diagnosis  POA    Complication of urinary electronic stimulator device, initial encounter [T83.9XXA]  Yes    History of ANTONY infection [Z86.19]  Yes    Labile blood pressure [R09.89]  Yes    S/P angioplasty with stent [Z95.820]  Not Applicable    History of CVA (cerebrovascular accident) [Z86.73]  Not Applicable    Hyperlipidemia [E78.5]  Yes    Mixed incontinence [N39.46]  Yes    Bronchiectasis [J47.9]  Yes      Resolved Hospital Problems    Diagnosis Date Resolved POA    **Hypertensive emergency [I16.1] 2023 Yes    Acute headache [R51.9] 2023 Yes        Brief Admitting HPI     Patient is a 77 y.o. female presents with history of bronchiectasis, A-fib, history of stroke who presents to the ER from [facility] office due to systolic blood pressure of 250.  Patient got a bladder stimulator approximately 7 weeks ago and she was following up due to some drainage in her back.  Nurse practitioner wanted patient to do a 10-day course of Keflex and they would reevaluate.  In the ER ESR CRP were all negative white count was normal.  But blood pressure have been in the 200-250 range over 90's.  Her heart rate has been mostly in the 50s.  She is on metoprolol 50 XL and valsartan 160.  She tried Norvasc in the past But she had significant lower extremity edema.   Patient states that there is no rhyme or reason to her blood pressure getting too elevated and then she says it will come down mostly on its own.  She says it can happen at home.  Not necessarily at the doctor's office.  It goes high and then goes low.     Patient states that she knows her blood pressure is up because she normally gets a headache.  Both patient and her  do not  necessarily think it is in any relation to anxiety or emotional stress.     Patient received some IV Cardene in the ER and then her blood pressure went to 96/59.  So no drip was started.  Then she got up to the floor and she was 228/98 and 210/75.  With 0.1 of clonidine and she went to systolic 112 and then 96.    Hospital Course     Pt admitted for hypertensive urgency.  She has a history of labile blood pressure and adverse reactions to a large amount of antihypertensives.  She was seen in consultation with cardiology.  Her blood pressure regimen was adjusted and she had better control overall.  This will need to continue to be closely monitored in the outpatient setting with her PCP.  In addition she was seen in consultation with urology given recent urinary stimulator device.  She had been seen in clinic on the day prior to admission and at that time he was recommended to complete 10 days of Keflex which was continued during this hospitalization, she will complete 5 more days upon discharge.  In addition CT scan was obtained of the chest after abnormal finding on CXR with findings of 1 cm groundglass opacity in the left lower lobe, with recommendations for repeat CT chest in 3 months (around 11/25/2023).  I personally discussed this with the patient and she expressed understanding.  She is stable for discharge home at this time.    Discharge Plan     HTN  Hypertensive emergency, resolved  Bradycardia  -long list of meds with ADEs per history  -Coreg 12.5 mg twice daily  -Valsartan increased to 240mg     Complication of urinary electronic stimulator device, initial encounter/bladder stimulator   -placed 7 weeks ago/for dehiscence versus seroma versus possible infection  -Urology evaluated  -ID evaluated-cont Keflex 500mg 4 times daily for 10d (cefazolin while in hospital per Urology)     Abnormal CXR  -Nodular opacification 1.9 cm left mid to lower lung  -sh states she does have a history of prior MAC  infection  -CT chest w/ lingular consolidation with bronchiectasis, 1 cm GGO in LLL (repeat CT chest in 3 months, around 11/25/2023)     Paroxysmal A-fib  -RC: Coreg 12.5 mg twice daily    Day of Discharge     Physical Exam:  Temp:  [98.1 °F (36.7 °C)-98.4 °F (36.9 °C)] 98.4 °F (36.9 °C)  Heart Rate:  [60-73] 73  Resp:  [16-18] 16  BP: (106-198)/() 153/60  Body mass index is 20.37 kg/m².  Physical Exam  Constitutional:       General: She is not in acute distress.     Appearance: Normal appearance.   Cardiovascular:      Rate and Rhythm: Normal rate.      Pulses: Normal pulses.   Pulmonary:      Effort: Pulmonary effort is normal. No respiratory distress.      Breath sounds: Normal breath sounds.   Abdominal:      General: Abdomen is flat.      Palpations: Abdomen is soft.   Musculoskeletal:      Right lower leg: No edema.      Left lower leg: No edema.   Neurological:      General: No focal deficit present.      Mental Status: She is alert and oriented to person, place, and time.      Cranial Nerves: No cranial nerve deficit.     Consultants     Consult Orders (all) (From admission, onward)       Start     Ordered    08/24/23 0702  Inpatient Infectious Diseases Consult  IN AM        Specialty:  Infectious Diseases  Provider:  Jaime Patel MD    08/23/23 2044    08/23/23 1713  Inpatient Urology Consult  Once        Specialty:  Urology  Provider:  Km Carrillo MD    08/23/23 1715    08/23/23 1711  Inpatient Consult to Case Management   Once        Provider:  (Not yet assigned)    08/23/23 1715    08/23/23 1401  [facility] (on-call MD unless specified) Details  Once,   Status:  Canceled        Specialty:  Hospitalist  Provider:  (Not yet assigned)    08/23/23 1400    08/23/23 1320  Cardiology (on-call MD unless specified)  Once        Specialty:  Cardiology  Provider:  Kingsley Zimmerman MD    08/23/23 1319                  Procedures     * Surgery not found *      Imaging Results  (All)       Procedure Component Value Units Date/Time    CT Chest Without Contrast Diagnostic [[id number]] Collected: 08/24/23 1850     Updated: 08/24/23 1914    Narrative:      CT CHEST WITHOUT CONTRAST     HISTORY: Left lower lobe opacity demonstrated on chest x-ray.      TECHNIQUE: Chest CT includes axial imaging from the thoracic inlet to  the upper abdomen without IV contrast.     COMPARISON: PA and lateral chest 08/24/2023. No previous chest CT for  comparison.     FINDINGS: There is abnormal increased density and mild bronchiectasis  within the lingula which appears partially collapsed and exhibits  consolidation and this accounts for the radiographic abnormality.  This  needs follow-up to resolution. There are also micronodules in the left  upper lobe and to a lesser degree within the left lower lobe that are  favored to be inflammatory or infectious in etiology. Within the  superior segment left lower lobe there is a 1 cm ground glass nodular  opacity on image 32. Calcified nodule is present in the right lower  lobe. No endotracheal or central endobronchial lesion is evident. Aortic  atherosclerotic calcifications and coronary arterial calcifications are  present. There is no pleural or pericardial effusion. Imaging through  the upper abdomen appears within normal limits. There is a compression  deformity at T10 that is most likely chronic.       Impression:      1. Lingular consolidation with bronchiectasis accounts for the  radiographic abnormality and this may be inflammatory/infectious in  etiology. There are also small clusters of micronodules in the left  upper lobe and left lower lobe that are favored to be inflammatory or  infectious in etiology. Additionally, there is a 1 cm ground glass  nodular opacity in the superior segment left lower lobe. CT follow-up in  3 months is recommended.  2. Atherosclerotic disease with coronary arterial calcifications.  3. Chronic appearing compression deformity of  T10.      Radiation dose reduction techniques were utilized, including automated  exposure control and exposure modulation based on body size.        This report was finalized on 8/24/2023 7:11 PM by Dr. Álvaro Julian M.D.       XR Chest PA & Lateral [[id number]] Collected: 08/24/23 1156     Updated: 08/24/23 1227    Narrative:      Chest radiograph     HISTORY: Hypertension     TECHNIQUE: Two PA and lateral radiographs     COMPARISON: Chest radiograph 8/23/2023       Impression:      FINDINGS AND IMPRESSION:  Nodular pulmonary pacification overlies the left mid to lower lung  measuring approximately 1.9 cm. While findings may represent round  pneumonia, further evaluation with chest CT is recommended to better  characterize and exclude the possibility of neoplasm. No pneumothorax is  seen. Cardiac silhouette is mildly enlarged. There is no pleural  effusion.           This report was finalized on 8/24/2023 12:24 PM by Dr. Lalo Che M.D.       XR Chest 2 View [[id number]] Collected: 08/23/23 1406     Updated: 08/23/23 1432    Narrative:      CHEST: 2 VIEWS     HISTORY: Hypertension, headaches.     COMPARISON: AP chest 08/23/2023.     FINDINGS: Heart size is within normal limits. The thoracic aorta is  tortuous. AP chest same date demonstrated subtle hazy opacity at the  left lung base and this does not appear changed. On the lateral view,  this persists and could represent prominence of the epicardial fat  though a subtle infiltrate is also in the differential diagnosis. Lungs  otherwise appear clear. The bones are osteopenic and there are mild  compression deformities within the thoracic spine.       Impression:      1. Mild subtle hazy opacity within the anterior aspect of the lingula.  This may represent prominence of the epicardial fat though it is  difficult to exclude a subtle infiltrate in this region. Lungs are  otherwise clear.  2. Osteopenia. Mild compression deformities within the thoracic  spine  are most likely chronic.     This report was finalized on 8/23/2023 2:29 PM by Dr. Álvaro Julian M.D.       CT Head Without Contrast [[id number]] Collected: 08/23/23 1127     Updated: 08/23/23 1203    Narrative:      EMERGENCY CT SCAN OF THE HEAD WITHOUT CONTRAST ON 08/23/2023     CLINICAL HISTORY:  Headache and hypertension.     TECHNIQUE: Spiral CT images were obtained from the base of the skull to  the vertex without intravenous contrast. The images were reformatted and  submitted in 3 mm thick axial, sagittal and coronal CT sections with  brain algorithm.     This is correlated to a prior head CT from [facility] on  07/16/2022.     FINDINGS: There is some scattered patchy areas of low density in the  periventricular subcortical white matter of the cerebral hemispheres  consistent with mild small vessel disease. The remainder of the brain  parenchyma is normal in attenuation. The ventricles are normal in size.  I see no focal mass effect. There is no midline shift. No extra-axial  fluid collections are identified. There is no evidence of acute  intracranial hemorrhage. The calvarium and the skull base are normal in  appearance. There has been a previous left mastoidectomy. The left  mastoid bowl is clear.  The right mastoid and middle ear cavity are  clear. Calcified plaques are present in the intracranial segment of the  distal right vertebral artery and cavernous segments of the internal  carotid arteries bilaterally.  There is some osteoarthritic change in  the temporomandibular joints with marginal spurring off the mandibular  heads bilaterally, right greater than left.       Impression:      1. No acute intracranial abnormality is identified with no significant  change when compared to prior head CT from [facility] on  07/16/2022.  2. There is mild small vessel disease in the cerebral white matter and  calcified plaques in the intracranial segment of the distal  right  vertebral artery and cavernous segments of the internal carotid arteries  bilaterally. There are osteoarthritic changes in the temporomandibular  joints, right greater than left, and there has been a previous left  mastoidectomy. The remainder of the head CT is within normal limits.      Radiation dose reduction techniques were utilized, including automated  exposure control and exposure modulation based on body size.        This report was finalized on 8/23/2023 12:00 PM by Dr. Yamil Singh M.D.       XR Chest 1 View [[id number]] Collected: 08/23/23 1026     Updated: 08/23/23 1030    Narrative:      CHEST SINGLE VIEW     HISTORY: Chest pain. History of hypertension.     COMPARISON: None.     FINDINGS: Heart size is within normal limits. There is subtle hazy  opacity within the left lung base laterally. This may be in part related  to prominence of the epicardial fat though a subtle hazy infiltrate is  in the differential diagnosis. The right lung appears clear. There is no  evidence for perihilar edema or definite effusion. Aortic vascular  calcifications are present.       Impression:      Subtle hazy opacity along the left lower cardiac border.  This could represent prominence of epicardial fat though a subtle hazy  infiltrate is also in the differential diagnosis. There are no previous  studies for comparison. Recommend follow-up with PA and lateral chest.     This report was finalized on 8/23/2023 10:26 AM by Dr. Álvaro Julian M.D.             Results for orders placed during the hospital encounter of 01/07/20    Duplex Carotid Ultrasound CAR    Interpretation Summary  · Distal right internal carotid artery mild stenosis.  · Mid left internal carotid artery mild stenosis.    Results for orders placed during the hospital encounter of 10/06/21    Adult Transthoracic Echo Complete W/ Cont if Necessary Per Protocol    Interpretation Summary  · Calculated left ventricular EF = 68% Estimated left  ventricular EF was in agreement with the calculated left ventricular EF. Left ventricular systolic function is normal.  · Left ventricular wall thickness is consistent with mild concentric hypertrophy  · Left ventricular diastolic function is consistent with (grade I) impaired relaxation.  · Normal right ventricular cavity size and systolic function noted.  · The left atrial cavity is mildly dilated.  · Mild aortic valve regurgitation is present.  · Mild tricuspid valve regurgitation is present.  · Calculated right ventricular systolic pressure from tricuspid regurgitation is 28 mmHg.  · There is a trivial pericardial effusion.    Pertinent Labs     Results from last 7 days   Lab Units 08/26/23  0346 08/25/23  0545 08/24/23  0400 08/23/23  1003   WBC 10*3/mm3 6.58 5.17 4.67 6.64   HEMOGLOBIN g/dL 12.9 12.8 12.3 14.9   PLATELETS 10*3/mm3 240 226 212 246     Results from last 7 days   Lab Units 08/27/23  0448 08/26/23  0346 08/25/23  0545 08/24/23  0400   SODIUM mmol/L 143 142 142 140   POTASSIUM mmol/L 4.1 3.9 3.7 3.8   CHLORIDE mmol/L 109* 109* 108* 106   CO2 mmol/L 24.5 23.0 23.9 27.1   BUN mg/dL 16 15 18 18   CREATININE mg/dL 0.65 0.59 0.59 0.66   GLUCOSE mg/dL 89 93 96 93   EGFR mL/min/1.73 91.4 93.5 93.5 91.0     Results from last 7 days   Lab Units 08/24/23  0400 08/23/23  1112   ALBUMIN g/dL 3.7 4.5   BILIRUBIN mg/dL 0.5 0.8   ALK PHOS U/L 56 72   AST (SGOT) U/L 23 37*   ALT (SGPT) U/L 16 23     Results from last 7 days   Lab Units 08/27/23  0448 08/26/23  0346 08/25/23  0545 08/24/23  0400 08/23/23  1112   CALCIUM mg/dL 9.2 8.9 8.9 9.1 10.0   ALBUMIN g/dL  --   --   --  3.7 4.5   MAGNESIUM mg/dL  --   --   --  2.1  --        Results from last 7 days   Lab Units 08/24/23  0400 08/23/23  1316 08/23/23  1112   HSTROP T ng/L 12* 11* 11*           Invalid input(s): LDLCALC          Test Results Pending at Discharge       Discharge Details        Discharge Medications        New Medications        Instructions  Start Date   carvedilol 12.5 MG tablet  Commonly known as: COREG   12.5 mg, Oral, 2 Times Daily With Meals      cephalexin 500 MG capsule  Commonly known as: Keflex   500 mg, Oral, 4 Times Daily      hydrALAZINE 10 MG tablet  Commonly known as: APRESOLINE   10 mg, Oral, Every 6 Hours PRN      saccharomyces boulardii 250 MG capsule  Commonly known as: FLORASTOR   250 mg, Oral, 2 Times Daily             Changes to Medications        Instructions Start Date   valsartan 160 MG tablet  Commonly known as: DIOVAN  What changed: how much to take   240 mg, Oral, Nightly             Continue These Medications        Instructions Start Date   acetaminophen 650 MG 8 hr tablet  Commonly known as: TYLENOL   1,300 mg, Oral, Every 8 Hours PRN      aspirin 81 MG EC tablet   81 mg, Oral, Daily      atorvastatin 40 MG tablet  Commonly known as: LIPITOR   40 mg, Oral, Daily      calcium carbonate 1250 (500 Ca) MG tablet  Commonly known as: OS-JERRELL   1 tablet, Oral, 2 Times Daily      pantoprazole 40 MG EC tablet  Commonly known as: PROTONIX   40 mg, Oral, Every Morning             Stop These Medications      metoprolol succinate XL 50 MG 24 hr tablet  Commonly known as: TOPROL-XL              Allergies   Allergen Reactions    Codeine Hives and Nausea Only    Contrast Dye (Echo Or Unknown Ct/Mr) Hives    Ct Contrast Hives    Erythromycin Diarrhea     Side effect     Gadolinium Derivatives (Mr Contrast) Hives    Iodine Hives    Lisinopril Other (See Comments)     COUGH    Morphine Itching and Delirium    Moxifloxacin      Elevated liver enzymes    Sulfa Antibiotics Hives and Nausea Only    Pneumococcal Vaccines Swelling and Rash       Discharge Disposition:  Home or Self Care      Discharge Diet:  Diet Order   Procedures    Diet: Cardiac Diets; Healthy Heart (2-3 Na+); Texture: Regular Texture (IDDSI 7); Fluid Consistency: Thin (IDDSI 0)       Discharge Activity:   Activity Instructions       Activity as Tolerated              CODE  STATUS:    Code Status and Medical Interventions:   Ordered at: 08/23/23 1715     Code Status (Patient has no pulse and is not breathing):    CPR (Attempt to Resuscitate)     Medical Interventions (Patient has pulse or is breathing):    Full       Future Appointments   Date Time Provider Department Center   11/21/2023 12:30 PM Kori Rodriguez APRN [facility]      Follow-up Information       Josep Kelley MD Follow up in 1 week(s).    Specialty: Internal Medicine  Contact information:  [address] [place] [id number] [phone]                             Time Spent on Discharge:  Greater than 30 minutes spent on discharge management including final examination, discussion of hospital stay and patient education, preparation of records, medication reconciliation, follow up planning      Raymond Wilkins MD  [facility]  08/27/23  08:27 EDT

## 2023-08-27 NOTE — PLAN OF CARE
Goal Outcome Evaluation:  Plan of Care Reviewed With: patient           Outcome Evaluation: Patient being discharged home. IV and heart monitor removed. Patient verbalizes understanding.

## 2023-11-10 ENCOUNTER — APPOINTMENT (OUTPATIENT)
Dept: WOMENS IMAGING | Facility: HOSPITAL | Age: 78
End: 2023-11-10
Payer: MEDICARE

## 2023-11-10 PROCEDURE — 77067 SCR MAMMO BI INCL CAD: CPT | Performed by: RADIOLOGY

## 2023-11-10 PROCEDURE — 77063 BREAST TOMOSYNTHESIS BI: CPT | Performed by: RADIOLOGY

## 2023-12-14 ENCOUNTER — OFFICE VISIT (OUTPATIENT)
Age: 78
End: 2023-12-14
Payer: MEDICARE

## 2023-12-14 VITALS
WEIGHT: 115.6 LBS | SYSTOLIC BLOOD PRESSURE: 152 MMHG | DIASTOLIC BLOOD PRESSURE: 80 MMHG | BODY MASS INDEX: 20.48 KG/M2 | HEART RATE: 53 BPM | HEIGHT: 63 IN

## 2023-12-14 DIAGNOSIS — I10 ESSENTIAL HYPERTENSION: ICD-10-CM

## 2023-12-14 DIAGNOSIS — E78.2 MIXED HYPERLIPIDEMIA: ICD-10-CM

## 2023-12-14 DIAGNOSIS — I25.10 CORONARY ARTERY DISEASE INVOLVING NATIVE CORONARY ARTERY OF NATIVE HEART WITHOUT ANGINA PECTORIS: Primary | ICD-10-CM

## 2023-12-14 PROCEDURE — 93000 ELECTROCARDIOGRAM COMPLETE: CPT | Performed by: INTERNAL MEDICINE

## 2023-12-14 PROCEDURE — 1159F MED LIST DOCD IN RCRD: CPT | Performed by: INTERNAL MEDICINE

## 2023-12-14 PROCEDURE — 3077F SYST BP >= 140 MM HG: CPT | Performed by: INTERNAL MEDICINE

## 2023-12-14 PROCEDURE — 3079F DIAST BP 80-89 MM HG: CPT | Performed by: INTERNAL MEDICINE

## 2023-12-14 PROCEDURE — 1160F RVW MEDS BY RX/DR IN RCRD: CPT | Performed by: INTERNAL MEDICINE

## 2023-12-14 PROCEDURE — 99214 OFFICE O/P EST MOD 30 MIN: CPT | Performed by: INTERNAL MEDICINE

## 2023-12-14 RX ORDER — MULTIVIT WITH MINERALS/LUTEIN
4000 TABLET ORAL DAILY
COMMUNITY

## 2023-12-14 RX ORDER — VALSARTAN 160 MG/1
160 TABLET ORAL 2 TIMES DAILY
Qty: 45 TABLET | Refills: 0 | Status: SHIPPED | OUTPATIENT
Start: 2023-12-14 | End: 2023-12-14

## 2023-12-14 RX ORDER — VALSARTAN 160 MG/1
160 TABLET ORAL 2 TIMES DAILY
Qty: 180 TABLET | Refills: 3 | Status: SHIPPED | OUTPATIENT
Start: 2023-12-14

## 2023-12-14 NOTE — PROGRESS NOTES
Patient Education: [facility] Follow Up Office Note     Encounter Date:23  Patient:Della Vines  :1945  MRN:[id number]      Chief Complaint:   Chief Complaint   Patient presents with    Coronary Artery Disease     6 month f/u     History of Presenting Illness:      Ms. Vines is a 78 y.o. woman with past medical history notable for coronary artery disease status post percutaneous intervention, TIAs, hypertension, mixed hyperlipidemia, and chronic lung disease who presents to my office for follow-up.  I last saw her when she was in the hospital for hypertensive urgency and labile blood pressures.  Adjustments were made then.  Since leaving the hospital blood pressure still is high at home on occasions.  Does have occasional chest pain when this occurs.         Review of Systems:  Review of Systems   Constitutional: Negative.   HENT: Negative.     Eyes: Negative.    Cardiovascular: Negative.    Respiratory: Negative.     Endocrine: Negative.    Hematologic/Lymphatic: Negative.    Skin: Negative.    Musculoskeletal: Negative.    Gastrointestinal: Negative.    Genitourinary: Negative.    Neurological: Negative.    Psychiatric/Behavioral: Negative.     Allergic/Immunologic: Negative.        Current Outpatient Medications on File Prior to Visit   Medication Sig Dispense Refill    acetaminophen (TYLENOL) 650 MG 8 hr tablet Take 2 tablets by mouth Every 8 (Eight) Hours As Needed for Mild Pain.      aspirin 81 MG EC tablet Take 1 tablet by mouth Daily.      atorvastatin (LIPITOR) 40 MG tablet TAKE 1 TABLET BY MOUTH DAILY 90 tablet 3    calcium carbonate (OS-JERRELL) 1250 (500 Ca) MG tablet Take 1 tablet by mouth 2 (Two) Times a Day.      carvedilol (COREG) 12.5 MG tablet Take 1 tablet by mouth 2 (Two) Times a Day With Meals. 60 tablet 0    pantoprazole (PROTONIX) 40 MG EC tablet TAKE 1 TABLET BY MOUTH EVERY MORNING 90 tablet 3    saccharomyces boulardii (FLORASTOR) 250 MG capsule Take 1 capsule by mouth 2 (Two) Times a  Day. 60 capsule 0    VITAMIN E 1000 UNIT capsule Take 4 capsules by mouth Daily.      [DISCONTINUED] valsartan (DIOVAN) 160 MG tablet Take 1.5 tablets by mouth Every Night. 45 tablet 0    hydrALAZINE (APRESOLINE) 10 MG tablet Take 1 tablet by mouth Every 6 (Six) Hours As Needed (SBP > 180). (Patient not taking: Reported on 12/14/2023) 30 tablet 0     No current facility-administered medications on file prior to visit.       Allergies   Allergen Reactions    Codeine Hives and Nausea Only    Contrast Dye (Echo Or Unknown Ct/Mr) Hives    Ct Contrast Hives    Erythromycin Diarrhea     Side effect     Gadolinium Derivatives (Mr Contrast) Hives    Iodine Hives    Lisinopril Other (See Comments)     COUGH    Morphine Itching and Delirium    Moxifloxacin      Elevated liver enzymes    Sulfa Antibiotics Hives and Nausea Only    Pneumococcal Vaccines Swelling and Rash       Past Medical History:   Diagnosis Date    Abnormal stress test 01/13/2020    Added automatically from request for surgery [id number]    Acid reflux disease     Arthritis     Bell's palsy     Bronchiectasis without complication     Dr Campbell    Cerebellar infarction     Chest pain     CVA (cerebral vascular accident) 05/19/2014    Overview:  Per old records in distant past in internal capsule    History of ANTONY infection 08/23/2023    history of ANTONY but AFB negative on bronchoscopy 11/2018.     Hyperlipidemia     Hypertension     Lung disease     Osteoporosis     PAF (paroxysmal atrial fibrillation)     Skin cancer     Stroke     TIA (transient ischemic attack)     Urinary frequency     wears pads    Urinary tract infection        Past Surgical History:   Procedure Laterality Date    ADENOIDECTOMY      APPENDECTOMY      BREAST FIBROADENOMA SURGERY      BRONCHOSCOPY      CARDIAC CATHETERIZATION N/A 01/17/2020    Procedure: Left Heart Cath;  Surgeon: Kignsley Zimmerman MD;  Location: [facility] CATH INVASIVE LOCATION;  Service: Cardiovascular    CARDIAC  CATHETERIZATION N/A 01/17/2020    Procedure: Coronary angiography;  Surgeon: Kingsley Zimmerman MD;  Location:  [facility] CATH INVASIVE LOCATION;  Service: Cardiovascular    CARDIAC CATHETERIZATION N/A 01/17/2020    Procedure: Left ventriculography;  Surgeon: Kingsley Zimmerman MD;  Location:  [facility] CATH INVASIVE LOCATION;  Service: Cardiovascular    CARDIAC CATHETERIZATION N/A 01/17/2020    Procedure: Percutaneous Coronary Intervention;  Surgeon: Kingsley Zimmerman MD;  Location:  [facility] CATH INVASIVE LOCATION;  Service: Cardiovascular    CARDIAC CATHETERIZATION N/A 01/17/2020    Procedure: Stent GERA coronary;  Surgeon: Kingsley Zimmerman MD;  Location:  [facility] CATH INVASIVE LOCATION;  Service: Cardiovascular    CARDIAC CATHETERIZATION N/A 04/04/2022    Procedure: Coronary angiography;  Surgeon: Kvng Brugos MD;  Location:  [facility] CATH INVASIVE LOCATION;  Service: Cardiovascular;  Laterality: N/A;    CARDIAC CATHETERIZATION N/A 04/04/2022    Procedure: Left heart cath;  Surgeon: Kvng Burgos MD;  Location:  [facility] CATH INVASIVE LOCATION;  Service: Cardiovascular;  Laterality: N/A;    CARDIAC CATHETERIZATION N/A 04/04/2022    Procedure: Left ventriculography;  Surgeon: Kvng Burgos MD;  Location:  [facility] CATH INVASIVE LOCATION;  Service: Cardiovascular;  Laterality: N/A;    COLONOSCOPY      HYSTERECTOMY      INTERSTIM PLACEMENT N/A 7/6/2023    Procedure: INTERSTIM STAGE 1/2;  Surgeon: Prasanna Alfaro MD;  Location: [facility] MAIN OR;  Service: Urology;  Laterality: N/A;    INTERSTIM PLACEMENT N/A 7/6/2023    Procedure: INTERSTIM STAGE 1/2;  Surgeon: Prasanna Alfaro MD;  Location: [facility] MAIN OR;  Service: Urology;  Laterality: N/A;    MASTOID SURGERY      TONSILLECTOMY         Social History     Socioeconomic History    Marital status:      Spouse name: Tyrone Vines    Number of children: 1    Years of education: 14   Tobacco Use    Smoking status: Former     Packs/day: 1.50     Years: 30.00  "    Additional pack years: 0.00     Total pack years: 45.00     Types: Cigarettes     Start date:      Quit date:      Years since quittin.9    Smokeless tobacco: Never    Tobacco comments:     caffeine use: 1 CUP COFFEE DAILY    Vaping Use    Vaping Use: Never used   Substance and Sexual Activity    Alcohol use: Not Currently     Comment: quit 42 years ago    Drug use: Not Currently     Comment: 42 years ago    Sexual activity: Defer       Family History   Problem Relation Age of Onset    Breast cancer Mother     Heart failure Father     Hypertension Father     Heart disease Father     Aneurysm Daughter     Malig Hyperthermia Neg Hx        The following portions of the patient's history were reviewed and updated as appropriate: allergies, current medications, past family history, past medical history, past social history, past surgical history and problem list.       Objective:       Vitals:    23 1023   BP: 152/80   BP Location: Left arm   Patient Position: Sitting   Pulse: 53   Weight: 52.4 kg (115 lb 9.6 oz)   Height: 160 cm (63\")     Body mass index is 20.48 kg/m².     Physical Exam:  Constitutional: Well appearing, Well-developed, No acute distress   HENT: Oropharynx clear and membrane moist  Eyes: Normal conjunctiva, no sclera icterus.  Neck: Supple, no carotid bruit bilaterally.  Cardiovascular: Regular rate and rhythm, No Murmur, No bilateral lower extremity edema.  Pulmonary: Normal respiratory effort, normal lung sounds, no wheezing.  Neurological: Alert and orient x 3.   Skin: Warm, dry, no ecchymosis, no rash.  Psych: Appropriate mood and affect. Normal judgment and insight.        Lab Results   Component Value Date    GLUCOSE 89 2023    BUN 16 2023    CREATININE 0.65 2023    EGFRIFNONA 100 2020    EGFRIFAFRI >60 2023    BCR 24.6 2023    K 4.1 2023    CO2 24.5 2023    CALCIUM 9.2 2023    ALBUMIN 3.7 2023    LABIL2 1.3 " 2023    AST 23 2023    ALT 16 2023       Lab Results   Component Value Date    WBC 6.58 2023    HGB 12.9 2023    HCT 38.0 2023    MCV 89.6 2023     2023       Lab Results   Component Value Date    CHLPL 125 2020    CHLPL 125 2020    CHLPL 120 2020     Lab Results   Component Value Date    TRIG 94 2020    TRIG 112 2020    TRIG 100 2020     Lab Results   Component Value Date    HDL 44 (L) 2020    HDL 46 (L) 2020    HDL 41 (L) 2020     Lab Results   Component Value Date    LDL 62 2020    LDL 57 2020    LDL 59 2020     CMP 2023:  Sodium 141  Potassium 4.8  Chloride 104  CO2 27  BUN 18  Creatinine 0.6  ALT 18  AST 24  Alk phos 59    Lipid Panel 2023:  Total cholesterol: 122  Tri  HDL: 38  LDL: 61      ECG 12 Lead    Date/Time: 2023 12:29 PM  Performed by: Kingsley Zimmerman MD    Authorized by: Kingsley Zimmerman MD  Comparison: compared with previous ECG from 2023  Similar to previous ECG  Rhythm: sinus rhythm      Holter 2022:  A normal monitor study.  Underlying heart rhythm is sinus rhythm with an average heart rate of of 57 bpm and a heart rate range of 46 bpm up to 79 bpm  Patient reported events correlated with underlying sinus rhythm.    Cardiac Catheterization 2022:  Left main: Normal  Left anterior descending: Normal proximally there is a long stent in the proximal and mid LAD that is widely patent and really looks fantastic 20% disease in the mid distal LAD and then normal distally the diagonals first diagonal is free of disease a second diagonal is small with a 90% ostial lesion it is covered by the stent not amenable to PCI does not look like it is changed much  Ramus intermedius:Not present  Circumflex: 20 to 30% proximal disease the remainder of the vessels normal  RCA: Is a dominant vessel.  20 to 30% proximal disease 20% mid disease normal  "distally    Stress MPI 4/4/2022:  Patient complained of 9/10 chest pain with exertion  Blood pressure demonstrated a hypertensive response to stress.  A horizontal ST segment depression of 1 mm in the inferolateral leads was noted during stress (II, III, aVF, V5, V6 and V4), beginning at 4 minutes of stress, and returning to baseline after more than 9 minutes of recovery.  Myocardial perfusion imaging indicates a normal myocardial perfusion study with no evidence of ischemia.  Left ventricular ejection fraction is hyperdynamic (Calculated EF > 70%). .  Because of severe chest pain, patient being evaluated further in [facility].    Cardiac Catheterization 1/17/2020:  Severe single-vessel coronary artery disease with a long stenoses involving the proximal to mid LAD at 90%.  There is 40% ostial circumflex stenoses and scattered 20% stenoses in the RCA.  Normal left ventricular function at 60% with a normal left ventricular filling pressure 11 mmHg.  Successful revascularization of proximal to mid LAD with placement of overlapping 2.25 x 33 mm and 2.25 x 8 mm Xience drug-eluting stents with the mid and proximal segments postdilated with a noncompliant 2.5 mm balloon to 16 roland.    Mobile Telemetry 1/16/2020:  A normal monitor study.  Underlying heart rhythm was sinus with an average rate of 86 bpm no significant arrhythmias noted during her study    Stress MPI 1/7/2020:  Patient complained of 8/10 chest pressure and \"burning sensation\" with exertion  Myocardial perfusion imaging indicates a normal myocardial perfusion study with no evidence of ischemia.  Left ventricular ejection fraction is severely reduced (Calculated EF = 22%).  The gating does not appear to accurate on this study. Would recommend correlation with echocardiogram.    Carotid Duplex 1/7/2020:  Distal right internal carotid artery mild stenosis.  Mid left internal carotid artery mild stenosis.     Assessment:          Diagnosis Plan   1. Coronary artery " disease involving native coronary artery of native heart without angina pectoris  ECG 12 Lead      2. Essential hypertension        3. Mixed hyperlipidemia                 Plan:       Ms. Vines is a 78 y.o. woman with past medical history notable for coronary artery disease status post percutaneous intervention, TIAs, hypertension, mixed hyperlipidemia, and chronic lung disease who presents to my office for follow-up.  She is doing reasonably well blood pressure still challenging to control and not quite at goal at all times I would recommend we try and break up her valsartan and to twice a day dosing at a slightly higher dose to see if this will even out her blood pressure control.      Coronary artery disease without angina:  Continue aspirin   Continue statin  Continue beta-blocker     CBC 8/2023 demonstrates normal platelet count    Hypertension:  Will try increasing valsartan to 160 mg twice daily dosing and monitor her response to this  Challenging to control due to intolerance of many medications  Unable to tolerate amlodipine due to leg swelling  Had issues with leg cramping on chlorthalidone  Had bladder leakage on other diuretics including hydrochlorothiazide  Consider adding spironolactone although when we tried this in the hospital she declined taking it due to concerns that it would aggravate her bladder  BMP 12/2023 demonstrates normal sodium, potassium, and creatinine     Mixed hyperlipidemia:  Patient currently on statin therapy  Lipid panel 3/2022 demonstrates good LDL control  CMP 10/2022 demonstrates normal ALT and AST     History of CVA:  Continue aspirin and statin    Advance Care Planning   ACP discussion was held with the patient during this visit. Patient does not have an advance directive, information provided.       Follow-up:  6 Month      Kingsley Zimmerman MD  [facility]  12/14/23  12:30 EST

## 2024-03-01 RX ORDER — ATORVASTATIN CALCIUM 40 MG/1
40 TABLET, FILM COATED ORAL DAILY
Qty: 90 TABLET | Refills: 3 | Status: SHIPPED | OUTPATIENT
Start: 2024-03-01

## 2024-03-15 ENCOUNTER — OFFICE VISIT (OUTPATIENT)
Dept: CARDIOLOGY | Facility: CLINIC | Age: 79
End: 2024-03-15
Payer: MEDICARE

## 2024-03-15 VITALS
DIASTOLIC BLOOD PRESSURE: 88 MMHG | HEIGHT: 63 IN | HEART RATE: 55 BPM | WEIGHT: 118 LBS | OXYGEN SATURATION: 96 % | SYSTOLIC BLOOD PRESSURE: 130 MMHG | BODY MASS INDEX: 20.91 KG/M2

## 2024-03-15 DIAGNOSIS — I25.10 CORONARY ARTERY DISEASE INVOLVING NATIVE CORONARY ARTERY OF NATIVE HEART WITHOUT ANGINA PECTORIS: Primary | ICD-10-CM

## 2024-03-15 PROCEDURE — 1159F MED LIST DOCD IN RCRD: CPT | Performed by: NURSE PRACTITIONER

## 2024-03-15 PROCEDURE — 3079F DIAST BP 80-89 MM HG: CPT | Performed by: NURSE PRACTITIONER

## 2024-03-15 PROCEDURE — 3075F SYST BP GE 130 - 139MM HG: CPT | Performed by: NURSE PRACTITIONER

## 2024-03-15 PROCEDURE — 99214 OFFICE O/P EST MOD 30 MIN: CPT | Performed by: NURSE PRACTITIONER

## 2024-03-15 PROCEDURE — 1160F RVW MEDS BY RX/DR IN RCRD: CPT | Performed by: NURSE PRACTITIONER

## 2024-03-15 PROCEDURE — 93000 ELECTROCARDIOGRAM COMPLETE: CPT | Performed by: NURSE PRACTITIONER

## 2024-03-15 NOTE — PROGRESS NOTES
[facility] Follow Up Office Note     Encounter Date:03/15/24  Patient:Della Vines  :1945  MRN:[id number]      Chief Complaint: No chief complaint on file.        History of Presenting Illness:        Della Vines is a 78 y.o. female who is here for follow-up.  She is a patient of Dr Zimmerman.    Patient has past medical history significant for coronary artery disease status post PCI, TIAs, hypertension, mixed hyperlipidemia and chronic lung disease.    Patient was last seen by Dr. Zimmerman in December at which time her valsartan was adjusted for high BP. She was doing well from a symptom perspective.    Today patient reports she is doing well.  Her pulmonary doctor has recommended for her to be more active.  She notes her blood pressure is controlled.  She is not having chest pain, worsened dyspnea on exertion, PND orthopnea.    Review of Systems:  Review of Systems   Cardiovascular:  Negative for chest pain, dyspnea on exertion, leg swelling, orthopnea and palpitations.   Respiratory:  Negative for shortness of breath.        Current Outpatient Medications on File Prior to Visit   Medication Sig Dispense Refill    acetaminophen (TYLENOL) 650 MG 8 hr tablet Take 2 tablets by mouth Every 8 (Eight) Hours As Needed for Mild Pain.      aspirin 81 MG EC tablet Take 1 tablet by mouth Daily.      atorvastatin (LIPITOR) 40 MG tablet TAKE 1 TABLET BY MOUTH DAILY 90 tablet 3    calcium carbonate (OS-JERRELL) 1250 (500 Ca) MG tablet Take 1 tablet by mouth 2 (Two) Times a Day.      carvedilol (COREG) 12.5 MG tablet Take 1 tablet by mouth 2 (Two) Times a Day With Meals. 60 tablet 0    hydrALAZINE (APRESOLINE) 10 MG tablet Take 1 tablet by mouth Every 6 (Six) Hours As Needed (SBP > 180). 30 tablet 0    pantoprazole (PROTONIX) 40 MG EC tablet TAKE 1 TABLET BY MOUTH EVERY MORNING 90 tablet 3    saccharomyces boulardii (FLORASTOR) 250 MG capsule Take 1 capsule by mouth 2 (Two) Times a Day. 60 capsule 0     valsartan (DIOVAN) 160 MG tablet TAKE 1 TABLET BY MOUTH TWICE DAILY 180 tablet 3    VITAMIN E 1000 UNIT capsule Take 4 capsules by mouth Daily.       No current facility-administered medications on file prior to visit.       Allergies   Allergen Reactions    Codeine Hives and Nausea Only    Contrast Dye (Echo Or Unknown Ct/Mr) Hives    Ct Contrast Hives    Erythromycin Diarrhea     Side effect     Gadolinium Derivatives (Mr Contrast) Hives    Iodine Hives    Lisinopril Other (See Comments)     COUGH    Morphine Itching and Delirium    Moxifloxacin      Elevated liver enzymes    Sulfa Antibiotics Hives and Nausea Only    Pneumococcal Vaccines Swelling and Rash       Past Medical History:   Diagnosis Date    Abnormal stress test 01/13/2020    Added automatically from request for surgery [id number]    Acid reflux disease     Arthritis     Bell's palsy     Bronchiectasis without complication     Dr Campbell    Cerebellar infarction     Chest pain     CVA (cerebral vascular accident) 05/19/2014    Overview:  Per old records in distant past in internal capsule    History of ANTONY infection 08/23/2023    history of ANTONY but AFB negative on bronchoscopy 11/2018.     Hyperlipidemia     Hypertension     Lung disease     Osteoporosis     PAF (paroxysmal atrial fibrillation)     Skin cancer     Stroke     TIA (transient ischemic attack)     Urinary frequency     wears pads    Urinary tract infection        Past Surgical History:   Procedure Laterality Date    ADENOIDECTOMY      APPENDECTOMY      BREAST FIBROADENOMA SURGERY      BRONCHOSCOPY      CARDIAC CATHETERIZATION N/A 01/17/2020    Procedure: Left Heart Cath;  Surgeon: Kingsley Zimmerman MD;  Location:  [facility];  Service: Cardiovascular    CARDIAC CATHETERIZATION N/A 01/17/2020    Procedure: Coronary angiography;  Surgeon: Kingsley Zimmerman MD;  Location:  [facility];  Service: Cardiovascular    CARDIAC CATHETERIZATION N/A 01/17/2020     Procedure: Left ventriculography;  Surgeon: Kingsley Zimmerman MD;  Location:  [facility];  Service: Cardiovascular    CARDIAC CATHETERIZATION N/A 2020    Procedure: Percutaneous Coronary Intervention;  Surgeon: Kingsley Zimmerman MD;  Location:  [facility];  Service: Cardiovascular    CARDIAC CATHETERIZATION N/A 2020    Procedure: Stent GERA coronary;  Surgeon: Kingsley Zimmerman MD;  Location:  [facility];  Service: Cardiovascular    CARDIAC CATHETERIZATION N/A 2022    Procedure: Coronary angiography;  Surgeon: Kvng Burgos MD;  Location:  [facility];  Service: Cardiovascular;  Laterality: N/A;    CARDIAC CATHETERIZATION N/A 2022    Procedure: Left heart cath;  Surgeon: Kvng Burgos MD;  Location:  [facility];  Service: Cardiovascular;  Laterality: N/A;    CARDIAC CATHETERIZATION N/A 2022    Procedure: Left ventriculography;  Surgeon: Kvng Burgos MD;  Location: [facility] CATH INVASIVE LOC[facility];  Service: Cardiovascular;  Laterality: N/A;    COLONOSCOPY      HYSTERECTOMY      INTERSTIM PLACEMENT N/A 2023    Procedure: INTERSTIM STAGE 1/2;  Surgeon: Prasanna Alfaro MD;  Location: [facility] MAIN OR;  Service: Urology;  Laterality: N/A;    INTERSTIM PLACEMENT N/A 2023    Procedure: INTERSTIM STAGE 1/2;  Surgeon: Prasanna Alfaro MD;  Location: [facility] MAIN OR;  Service: Urology;  Laterality: N/A;    MASTOID SURGERY      TONSILLECTOMY         Social History     Socioeconomic History    Marital status:      Spouse name: Tyrone Vines    Number of children: 1    Years of education: 14   Tobacco Use    Smoking status: Former     Current packs/day: 0.00     Average packs/day: 1.5 packs/day for 30.0 years (45.0 ttl pk-yrs)     Types: Cigarettes     Start date:      Quit date:      Years since quittin.2    Smokeless tobacco: Never    Tobacco comments:      "caffeine use: 1 CUP COFFEE DAILY    Vaping Use    Vaping status: Never Used   Substance and Sexual Activity    Alcohol use: Not Currently     Comment: quit 42 years ago    Drug use: Not Currently     Comment: 42 years ago    Sexual activity: Defer       Family History   Problem Relation Age of Onset    Breast cancer Mother     Heart failure Father     Hypertension Father     Heart disease Father     Aneurysm Daughter     Malig Hyperthermia Neg Hx        The following portions of the patient's history were reviewed and updated as appropriate: allergies, current medications, past family history, past medical history, past social history, past surgical history and problem list.       Objective:       Vitals:    03/15/24 1111   BP: 130/88   Pulse: 55   SpO2: 96%   Weight: 53.5 kg (118 lb)   Height: 160 cm (63\")         Physical Exam:  Constitutional: Alert and conversant  HENT: Oropharynx clear and membrane moist  Eyes: Normal conjunctiva, no sclera icterus  Neck: Supple, no carotid bruit bilaterally  Cardiovascular: Regular rate and rhythm, No Murmur, No bilateral lower extremity edema  Pulmonary: Normal respiratory effort, normal lung sounds, no wheezing  Neurological: Alert and orient x 3  Skin: Warm, dry, no ecchymosis, no rash  Psych: Appropriate mood and affect. Normal judgment and insight         Lab Results   Component Value Date     08/27/2023     08/26/2023    K 4.1 08/27/2023    K 3.9 08/26/2023     (H) 08/27/2023     (H) 08/26/2023    CO2 24.5 08/27/2023    CO2 23.0 08/26/2023    BUN 16 08/27/2023    BUN 15 08/26/2023    CREATININE 0.65 08/27/2023    CREATININE 0.59 08/26/2023    EGFRIFNONA 100 01/18/2020    EGFRIFNONA 76 01/13/2020    EGFRIFAFRI >60 02/22/2023    EGFRIFAFRI >60 02/13/2023    GLUCOSE 89 08/27/2023    GLUCOSE 93 08/26/2023    CALCIUM 9.2 08/27/2023    CALCIUM 8.9 08/26/2023    ALBUMIN 3.7 08/24/2023    ALBUMIN 4.5 08/23/2023    BILITOT 0.5 08/24/2023    BILITOT 0.8 " 08/23/2023    AST 23 08/24/2023    AST 37 (H) 08/23/2023    ALT 16 08/24/2023    ALT 23 08/23/2023     Lab Results   Component Value Date    WBC 6.01 03/13/2024    WBC 6.58 08/26/2023    HGB 13.6 03/13/2024    HGB 12.9 08/26/2023    HCT 43.9 03/13/2024    HCT 38.0 08/26/2023    MCV 97.3 03/13/2024    MCV 89.6 08/26/2023     03/13/2024     08/26/2023     Lab Results   Component Value Date    TRIG 94 08/25/2020    TRIG 112 05/20/2020    HDL 44 (L) 08/25/2020    HDL 46 (L) 05/20/2020    LDL 62 08/25/2020    LDL 57 05/20/2020     Lab Results   Component Value Date    PROBNP 254.0 04/04/2022    .3 (H) 02/13/2023    .8 05/10/2021     Lab Results   Component Value Date    TROPONINI <0.010 02/13/2023    TROPONINT 12 (H) 08/24/2023     Lab Results   Component Value Date    TSH 2.830 08/24/2023           ECG 12 Lead    Date/Time: 3/15/2024 1:32 PM  Performed by: Kori Rodriguez APRN    Authorized by: Kori Rodriguez APRN  Comparison: compared with previous ECG from 12/14/2023  Rhythm: sinus rhythm  Rate: normal  BPM: 50  ST Segments: ST segments normal          Holter 4/25/2022:  A normal monitor study.  Underlying heart rhythm is sinus rhythm with an average heart rate of of 57 bpm and a heart rate range of 46 bpm up to 79 bpm  Patient reported events correlated with underlying sinus rhythm.     Cardiac Catheterization 4/4/2022:  Left main: Normal  Left anterior descending: Normal proximally there is a long stent in the proximal and mid LAD that is widely patent and really looks fantastic 20% disease in the mid distal LAD and then normal distally the diagonals first diagonal is free of disease a second diagonal is small with a 90% ostial lesion it is covered by the stent not amenable to PCI does not look like it is changed much  Ramus intermedius:Not present  Circumflex: 20 to 30% proximal disease the remainder of the vessels normal  RCA: Is a dominant vessel.  20 to 30% proximal disease  "20% mid disease normal distally     Stress MPI 4/4/2022:  Patient complained of 9/10 chest pain with exertion  Blood pressure demonstrated a hypertensive response to stress.  A horizontal ST segment depression of 1 mm in the inferolateral leads was noted during stress (II, III, aVF, V5, V6 and V4), beginning at 4 minutes of stress, and returning to baseline after more than 9 minutes of recovery.  Myocardial perfusion imaging indicates a normal myocardial perfusion study with no evidence of ischemia.  Left ventricular ejection fraction is hyperdynamic (Calculated EF > 70%). .  Because of severe chest pain, patient being evaluated further in [facility].     Cardiac Catheterization 1/17/2020:  Severe single-vessel coronary artery disease with a long stenoses involving the proximal to mid LAD at 90%.  There is 40% ostial circumflex stenoses and scattered 20% stenoses in the RCA.  Normal left ventricular function at 60% with a normal left ventricular filling pressure 11 mmHg.  Successful revascularization of proximal to mid LAD with placement of overlapping 2.25 x 33 mm and 2.25 x 8 mm Xience drug-eluting stents with the mid and proximal segments postdilated with a noncompliant 2.5 mm balloon to 16 roland.     Mobile Telemetry 1/16/2020:  A normal monitor study.  Underlying heart rhythm was sinus with an average rate of 86 bpm no significant arrhythmias noted during her study     Stress MPI 1/7/2020:  Patient complained of 8/10 chest pressure and \"burning sensation\" with exertion  Myocardial perfusion imaging indicates a normal myocardial perfusion study with no evidence of ischemia.  Left ventricular ejection fraction is severely reduced (Calculated EF = 22%).  The gating does not appear to accurate on this study. Would recommend correlation with echocardiogram.     Carotid Duplex 1/7/2020:  Distal right internal carotid artery mild stenosis.  Mid left internal carotid artery mild stenosis.     Assessment:          Diagnosis Plan "   1. Coronary artery disease involving native coronary artery of native heart without angina pectoris  ECG 12 Lead             Plan:       Coronary artery disease without angina - hx of PCI. Continue aspirin, statin, beta blocker. Denies angina symptoms  Hypertension - multiple medication intolerances including swelling with amlodipine, issues with bladder leakage on diuretic-type BP medications.  She has tolerated increase / BID dosing of valsartan well  Mixed hyperlipidemia - continue statin therapy. Controlled on last lipid panel  History of CVA - aspirin and statin    Patient is seen today for follow-up.  She is stable from a cardiac perspective and I am not recommending any changes.  Follow-up with Dr. Zimmerman in 6 months, earlier with problems      Orders Placed This Encounter   Procedures    ECG 12 Lead     This order was created via procedure documentation     Order Specific Question:   Release to patient     Answer:   Routine Release [[id number]]            WALTER Topete  [facility]  03/15/24  13:32 EDT

## 2024-08-06 ENCOUNTER — TELEPHONE (OUTPATIENT)
Dept: CARDIOLOGY | Facility: CLINIC | Age: 79
End: 2024-08-06
Payer: MEDICARE

## 2024-08-06 ENCOUNTER — APPOINTMENT (OUTPATIENT)
Dept: GENERAL RADIOLOGY | Facility: HOSPITAL | Age: 79
End: 2024-08-06
Payer: MEDICARE

## 2024-08-06 ENCOUNTER — HOSPITAL ENCOUNTER (EMERGENCY)
Facility: HOSPITAL | Age: 79
Discharge: HOME | End: 2024-08-06
Attending: EMERGENCY MEDICINE | Admitting: EMERGENCY MEDICINE
Payer: MEDICARE

## 2024-08-06 VITALS
DIASTOLIC BLOOD PRESSURE: 70 MMHG | HEART RATE: 53 BPM | SYSTOLIC BLOOD PRESSURE: 164 MMHG | BODY MASS INDEX: 20.91 KG/M2 | WEIGHT: 118 LBS | TEMPERATURE: 98.1 F | OXYGEN SATURATION: 94 % | RESPIRATION RATE: 18 BRPM | HEIGHT: 63 IN

## 2024-08-06 DIAGNOSIS — R07.89 ATYPICAL CHEST PAIN: ICD-10-CM

## 2024-08-06 DIAGNOSIS — Z86.79 HISTORY OF CORONARY ARTERY DISEASE: ICD-10-CM

## 2024-08-06 DIAGNOSIS — I48.0 PAROXYSMAL ATRIAL FIBRILLATION: Primary | ICD-10-CM

## 2024-08-06 DIAGNOSIS — I48.91 ATRIAL FIBRILLATION WITH RAPID VENTRICULAR RESPONSE: Primary | ICD-10-CM

## 2024-08-06 LAB
ALBUMIN SERPL-MCNC: 3.4 G/DL (ref 3.5–5.2)
ALBUMIN/GLOB SERPL: 1.3 G/DL
ALP SERPL-CCNC: 62 U/L (ref 39–117)
ALT SERPL W P-5'-P-CCNC: 13 U/L (ref 1–33)
ANION GAP SERPL CALCULATED.3IONS-SCNC: 2.1 MMOL/L (ref 5–15)
AST SERPL-CCNC: 15 U/L (ref 1–32)
BASOPHILS # BLD AUTO: 0.05 10*3/MM3 (ref 0–0.2)
BASOPHILS NFR BLD AUTO: 0.9 % (ref 0–1.5)
BILIRUB SERPL-MCNC: 0.3 MG/DL (ref 0–1.2)
BUN SERPL-MCNC: 18 MG/DL (ref 8–23)
BUN/CREAT SERPL: 26.5 (ref 7–25)
CALCIUM SPEC-SCNC: 9.2 MG/DL (ref 8.6–10.5)
CHLORIDE SERPL-SCNC: 108 MMOL/L (ref 98–107)
CO2 SERPL-SCNC: 30.9 MMOL/L (ref 22–29)
CREAT SERPL-MCNC: 0.68 MG/DL (ref 0.57–1)
DEPRECATED RDW RBC AUTO: 40.9 FL (ref 37–54)
EGFRCR SERPLBLD CKD-EPI 2021: 89.8 ML/MIN/1.73
EOSINOPHIL # BLD AUTO: 0.28 10*3/MM3 (ref 0–0.4)
EOSINOPHIL NFR BLD AUTO: 4.8 % (ref 0.3–6.2)
ERYTHROCYTE [DISTWIDTH] IN BLOOD BY AUTOMATED COUNT: 11.8 % (ref 12.3–15.4)
GEN 5 2HR TROPONIN T REFLEX: 14 NG/L
GLOBULIN UR ELPH-MCNC: 2.6 GM/DL
GLUCOSE SERPL-MCNC: 99 MG/DL (ref 65–99)
HCT VFR BLD AUTO: 36.3 % (ref 34–46.6)
HGB BLD-MCNC: 11.9 G/DL (ref 12–15.9)
HOLD SPECIMEN: NORMAL
HOLD SPECIMEN: NORMAL
IMM GRANULOCYTES # BLD AUTO: 0.02 10*3/MM3 (ref 0–0.05)
IMM GRANULOCYTES NFR BLD AUTO: 0.3 % (ref 0–0.5)
LYMPHOCYTES # BLD AUTO: 2.24 10*3/MM3 (ref 0.7–3.1)
LYMPHOCYTES NFR BLD AUTO: 38.2 % (ref 19.6–45.3)
MCH RBC QN AUTO: 31.1 PG (ref 26.6–33)
MCHC RBC AUTO-ENTMCNC: 32.8 G/DL (ref 31.5–35.7)
MCV RBC AUTO: 94.8 FL (ref 79–97)
MONOCYTES # BLD AUTO: 0.57 10*3/MM3 (ref 0.1–0.9)
MONOCYTES NFR BLD AUTO: 9.7 % (ref 5–12)
NEUTROPHILS NFR BLD AUTO: 2.71 10*3/MM3 (ref 1.7–7)
NEUTROPHILS NFR BLD AUTO: 46.1 % (ref 42.7–76)
NRBC BLD AUTO-RTO: 0 /100 WBC (ref 0–0.2)
PLATELET # BLD AUTO: 232 10*3/MM3 (ref 140–450)
PMV BLD AUTO: 9.8 FL (ref 6–12)
POTASSIUM SERPL-SCNC: 4.3 MMOL/L (ref 3.5–5.2)
PROT SERPL-MCNC: 6 G/DL (ref 6–8.5)
QT INTERVAL: 304 MS
QT INTERVAL: 344 MS
QT INTERVAL: 419 MS
QTC INTERVAL: 412 MS
QTC INTERVAL: 474 MS
QTC INTERVAL: 504 MS
RBC # BLD AUTO: 3.83 10*6/MM3 (ref 3.77–5.28)
SODIUM SERPL-SCNC: 141 MMOL/L (ref 136–145)
TROPONIN T DELTA: -2 NG/L
TROPONIN T SERPL HS-MCNC: 16 NG/L
WBC NRBC COR # BLD AUTO: 5.87 10*3/MM3 (ref 3.4–10.8)
WHOLE BLOOD HOLD COAG: NORMAL
WHOLE BLOOD HOLD SPECIMEN: NORMAL

## 2024-08-06 PROCEDURE — 99284 EMERGENCY DEPT VISIT MOD MDM: CPT

## 2024-08-06 PROCEDURE — 71045 X-RAY EXAM CHEST 1 VIEW: CPT

## 2024-08-06 PROCEDURE — 96375 TX/PRO/DX INJ NEW DRUG ADDON: CPT

## 2024-08-06 PROCEDURE — 93005 ELECTROCARDIOGRAM TRACING: CPT

## 2024-08-06 PROCEDURE — 36415 COLL VENOUS BLD VENIPUNCTURE: CPT

## 2024-08-06 PROCEDURE — 84484 ASSAY OF TROPONIN QUANT: CPT | Performed by: EMERGENCY MEDICINE

## 2024-08-06 PROCEDURE — 93010 ELECTROCARDIOGRAM REPORT: CPT | Performed by: INTERNAL MEDICINE

## 2024-08-06 PROCEDURE — 80053 COMPREHEN METABOLIC PANEL: CPT | Performed by: EMERGENCY MEDICINE

## 2024-08-06 PROCEDURE — 96374 THER/PROPH/DIAG INJ IV PUSH: CPT

## 2024-08-06 PROCEDURE — 85025 COMPLETE CBC W/AUTO DIFF WBC: CPT

## 2024-08-06 PROCEDURE — 25010000002 ONDANSETRON PER 1 MG: Performed by: EMERGENCY MEDICINE

## 2024-08-06 PROCEDURE — 93005 ELECTROCARDIOGRAM TRACING: CPT | Performed by: EMERGENCY MEDICINE

## 2024-08-06 RX ORDER — ASPIRIN 325 MG
325 TABLET ORAL ONCE
Status: DISCONTINUED | OUTPATIENT
Start: 2024-08-06 | End: 2024-08-06 | Stop reason: HOSPADM

## 2024-08-06 RX ORDER — DILTIAZEM HYDROCHLORIDE 5 MG/ML
20 INJECTION INTRAVENOUS ONCE
Status: COMPLETED | OUTPATIENT
Start: 2024-08-06 | End: 2024-08-06

## 2024-08-06 RX ORDER — ONDANSETRON 2 MG/ML
4 INJECTION INTRAMUSCULAR; INTRAVENOUS ONCE
Status: COMPLETED | OUTPATIENT
Start: 2024-08-06 | End: 2024-08-06

## 2024-08-06 RX ORDER — SODIUM CHLORIDE 0.9 % (FLUSH) 0.9 %
10 SYRINGE (ML) INJECTION AS NEEDED
Status: DISCONTINUED | OUTPATIENT
Start: 2024-08-06 | End: 2024-08-06 | Stop reason: HOSPADM

## 2024-08-06 RX ADMIN — METOPROLOL TARTRATE 5 MG: 1 INJECTION, SOLUTION INTRAVENOUS at 04:51

## 2024-08-06 RX ADMIN — ONDANSETRON 4 MG: 2 INJECTION INTRAMUSCULAR; INTRAVENOUS at 04:51

## 2024-08-06 RX ADMIN — METOPROLOL TARTRATE 25 MG: 25 TABLET, FILM COATED ORAL at 04:51

## 2024-08-06 RX ADMIN — DILTIAZEM HYDROCHLORIDE 20 MG: 5 INJECTION, SOLUTION INTRAVENOUS at 04:07

## 2024-08-06 NOTE — TELEPHONE ENCOUNTER
Called and spoke with pt. She was seen in the ER today for hypertension, and AFIB. She stated that she does feel a lot better and her B/P has come down. The sooner they can get her in is 08/12/24 with Ellen. I did double checked to see if our APRN had any openings this week, and unfortunately we do not have any slots available. She would like to speak with you.       She can be reached at [phone]

## 2024-08-06 NOTE — TELEPHONE ENCOUNTER
Called and talked with patient she is doing better blood pressure still little bit up she has not taken any of her as needed hydralazine.  Heart rate still is in the upper 40s.  Sounds like this was a fairly unprovoked event woke her up from sleep.  Rates were very fast.  Has somewhat limited options to treat her given her underlying bradycardia and structural heart disease with coronary disease with prior stents and history of cardiomyopathy making flecainide not a great option.  Unfortunately her blood pressure is challenging to control carvedilol was really her best option as she has pretty much intolerances to almost every other medication we have tried.  I would imagine she would probably would not do great with sotalol Tikosyn may be an option regardless some going to have her see our EP team as well to talk about options whether we consider ablation or other antiarrhythmics such as Tikosyn or amiodarone.  In the meantime it has been about 2 years since we have gotten an echocardiogram and we will order an echocardiogram.  Furthermore she does have an elevated [id number] given her age history of stroke and hypertension.  I have called in a prescription for Eliquis.  Will see about getting her into the office currently one of our nurse practitioners can see her on 8/12 see if we can work her in sooner and also get her set up to see our electrophysiology team

## 2024-08-06 NOTE — ED PROVIDER NOTES
EMERGENCY DEPARTMENT ENCOUNTER  Room Number:  11/11  PCP: Josep Kelley MD  Independent Historians: Patient      HPI:  Chief Complaint: had concerns including Chest Pain.     A complete HPI/ROS/PMH/PSH/SH/FH are unobtainable due to: Nothing      Context: Della Vines is a 78 y.o. female with a medical history of CAD status post PCI who presents to the ED c/o acute chest pain onset this evening.  She states that she had this burning sensation in her jaw and then pressure in her chest.  She states that she felt this way similar when she required a stent in the past.  She denies palpitations.  She denies history of atrial fibrillation.  She denies shortness of breath, nausea, vomiting.      Review of prior external notes (non-ED) -and- Review of prior external test results outside of this encounter: See ED course below for summary of prior cardiac catheterization        PAST MEDICAL HISTORY  Active Ambulatory Problems     Diagnosis Date Noted    Visit for gynecologic examination 11/09/2017    Mixed incontinence 11/09/2017    Atrophic vaginitis 11/09/2017    Age related osteoporosis 11/09/2017    Bronchiectasis 05/19/2014    Essential hypertension 04/28/2016    GERD (gastroesophageal reflux disease) 04/03/2014    Hyperlipidemia 12/30/2019    ANTONY (mycobacterium avium-intracellulare) 11/22/2016    Coronary artery disease involving native coronary artery of native heart without angina pectoris 01/17/2020    History of CVA (cerebrovascular accident) 09/20/2021    S/P angioplasty with stent 03/23/2022    Pain in thoracic spine 11/20/2013    Pulmonary nodules 05/09/2016    Purple toe syndrome of both feet 05/24/2019    Reactive airway disease without complication 05/11/2021    Venous reflux 07/10/2022    Osteoporosis 08/23/2023    Bronchiectasis 05/19/2014    CAD (coronary artery disease) 01/19/2020    Coronary artery disease of native artery of native heart with stable angina pectoris 01/17/2020    Complication of  urinary electronic stimulator device, initial encounter 08/23/2023    History of ANTONY infection 08/23/2023    Labile blood pressure 08/23/2023    PAF (paroxysmal atrial fibrillation) 08/23/2023     Resolved Ambulatory Problems     Diagnosis Date Noted    CVA (cerebral vascular accident) 05/19/2014    Abnormal stress test 01/13/2020    Hypertensive emergency 08/23/2023    Acute headache 08/23/2023     Past Medical History:   Diagnosis Date    Acid reflux disease     Arthritis     Bell's palsy     Bronchiectasis without complication     Cerebellar infarction     Chest pain     Hypertension     Lung disease     Skin cancer     Stroke     TIA (transient ischemic attack)     Urinary frequency     Urinary tract infection          PAST SURGICAL HISTORY  Past Surgical History:   Procedure Laterality Date    ADENOIDECTOMY      APPENDECTOMY      BREAST FIBROADENOMA SURGERY      BRONCHOSCOPY      CARDIAC CATHETERIZATION N/A 01/17/2020    Procedure: Left Heart Cath;  Surgeon: Kingsley Zimmerman MD;  [facility];  Service: Cardiovascular    CARDIAC CATHETERIZATION N/A 01/17/2020    Procedure: Coronary angiography;  Surgeon: Kingsley Zimmerman MD;  [facility];  Service: Cardiovascular    CARDIAC CATHETERIZATION N/A 01/17/2020    Procedure: Left ventriculography;  Surgeon: Kingsley Zimmerman MD;  Location:  [facility] CATH INVASIVE LOCATION;  Service: Cardiovascular    CARDIAC CATHETERIZATION N/A 01/17/2020    Procedure: Percutaneous Coronary Intervention;  Surgeon: Kingsley Zimmerman MD;  [facility];  Service: Cardiovascular    CARDIAC CATHETERIZATION N/A 01/17/2020    Procedure: Stent GERA coronary;  Surgeon: Kingsley Zimmerman MD;  [facility];  Service: Cardiovascular    CARDIAC CATHETERIZATION N/A 04/04/2022    Procedure: Coronary angiography;  Surgeon: Kvng Burgos MD;  [facility];   Service: Cardiovascular;  Laterality: N/A;    CARDIAC CATHETERIZATION N/A 2022    Procedure: Left heart cath;  Surgeon: Kvng Burgos MD;  Location:  [facility] CATH INVASIVE LOCATION;  Service: Cardiovascular;  Laterality: N/A;    CARDIAC CATHETERIZATION N/A 2022    Procedure: Left ventriculography;  Surgeon: Kvng Burgos MD;  Location:  [facility] CATH INVASIVE LOCATION;  Service: Cardiovascular;  Laterality: N/A;    COLONOSCOPY      HYSTERECTOMY      INTERSTIM PLACEMENT N/A 2023    Procedure: INTERSTIM STAGE 1/2;  Surgeon: Prasanna Alfaro MD;  Location:  [facility] MAIN OR;  Service: Urology;  Laterality: N/A;    INTERSTIM PLACEMENT N/A 2023    Procedure: INTERSTIM STAGE 1/2;  Surgeon: Prasanna Alfaro MD;  [facility];  Service: Urology;  Laterality: N/A;    MASTOID SURGERY      TONSILLECTOMY           FAMILY HISTORY  Family History   Problem Relation Age of Onset    Breast cancer Mother     Heart failure Father     Hypertension Father     Heart disease Father     Aneurysm Daughter     Malig Hyperthermia Neg Hx          SOCIAL HISTORY  Social History     Socioeconomic History    Marital status:      Spouse name: Tyrone Vines    Number of children: 1    Years of education: 14   Tobacco Use    Smoking status: Former     Current packs/day: 0.00     Average packs/day: 1.5 packs/day for 30.0 years (45.0 ttl pk-yrs)     Types: Cigarettes     Start date:      Quit date:      Years since quittin.6    Smokeless tobacco: Never    Tobacco comments:     caffeine use: 1 CUP COFFEE DAILY    Vaping Use    Vaping status: Never Used   Substance and Sexual Activity    Alcohol use: Not Currently     Comment: quit 42 years ago    Drug use: Not Currently     Comment: 42 years ago    Sexual activity: Defer         ALLERGIES  Codeine, Contrast dye (echo or unknown ct/mr), Ct contrast, Erythromycin, Gadolinium derivatives (mr contrast), Iodine, Lisinopril, Morphine,  Moxifloxacin, Sulfa antibiotics, and Pneumococcal vaccines      REVIEW OF SYSTEMS  Review of all 14 systems is negative other than stated in the HPI above.      PHYSICAL EXAM    I have reviewed the triage vital signs and nursing notes.    ED Triage Vitals   Temp Heart Rate Resp BP SpO2   08/06/24 0335 08/06/24 0314 08/06/24 0314 08/06/24 0314 08/06/24 0314   98.1 °F (36.7 °C) (!) 154 18 (!) 194/94 98 %      Temp src Heart Rate Source Patient Position BP Location FiO2 (%)   -- -- -- -- --                GENERAL: awake and alert, well-appearing, no acute distress  HENT: Normocephalic, atraumatic  EYES: no scleral icterus  CV: Irregularly irregular rhythm, tachycardic, no murmur, no peripheral edema  RESPIRATORY: normal effort  ABDOMEN: soft, nontender throughout  MUSCULOSKELETAL: no deformity, no calf tenderness bilaterally  NEURO: alert, moves all extremities, follows commands, GCS 15, cranial nerves II through XII intact  PSYCH: calm, cooperative  SKIN: Warm, dry          LAB RESULTS  Recent Results (from the past 24 hour(s))   Comprehensive Metabolic Panel    Collection Time: 08/06/24  3:19 AM    Specimen: Blood   Result Value Ref Range    Glucose 99 65 - 99 mg/dL    BUN 18 8 - 23 mg/dL    Creatinine 0.68 0.57 - 1.00 mg/dL    Sodium 141 136 - 145 mmol/L    Potassium 4.3 3.5 - 5.2 mmol/L    Chloride 108 (H) 98 - 107 mmol/L    CO2 30.9 (H) 22.0 - 29.0 mmol/L    Calcium 9.2 8.6 - 10.5 mg/dL    Total Protein 6.0 6.0 - 8.5 g/dL    Albumin 3.4 (L) 3.5 - 5.2 g/dL    ALT (SGPT) 13 1 - 33 U/L    AST (SGOT) 15 1 - 32 U/L    Alkaline Phosphatase 62 39 - 117 U/L    Total Bilirubin 0.3 0.0 - 1.2 mg/dL    Globulin 2.6 gm/dL    A/G Ratio 1.3 g/dL    BUN/Creatinine Ratio 26.5 (H) 7.0 - 25.0    Anion Gap 2.1 (L) 5.0 - 15.0 mmol/L    eGFR 89.8 >60.0 mL/min/1.73   High Sensitivity Troponin T    Collection Time: 08/06/24  3:19 AM    Specimen: Blood   Result Value Ref Range    HS Troponin T 16 (H) <14 ng/L   Green Top (Gel)     Collection Time: 08/06/24  3:19 AM   Result Value Ref Range    Extra Tube Hold for add-ons.    Lavender Top    Collection Time: 08/06/24  3:19 AM   Result Value Ref Range    Extra Tube hold for add-on    Gold Top - SST    Collection Time: 08/06/24  3:19 AM   Result Value Ref Range    Extra Tube Hold for add-ons.    Light Blue Top    Collection Time: 08/06/24  3:19 AM   Result Value Ref Range    Extra Tube Hold for add-ons.    CBC Auto Differential    Collection Time: 08/06/24  3:19 AM    Specimen: Blood   Result Value Ref Range    WBC 5.87 3.40 - 10.80 10*3/mm3    RBC 3.83 3.77 - 5.28 10*6/mm3    Hemoglobin 11.9 (L) 12.0 - 15.9 g/dL    Hematocrit 36.3 34.0 - 46.6 %    MCV 94.8 79.0 - 97.0 fL    MCH 31.1 26.6 - 33.0 pg    MCHC 32.8 31.5 - 35.7 g/dL    RDW 11.8 (L) 12.3 - 15.4 %    RDW-SD 40.9 37.0 - 54.0 fl    MPV 9.8 6.0 - 12.0 fL    Platelets 232 140 - 450 10*3/mm3    Neutrophil % 46.1 42.7 - 76.0 %    Lymphocyte % 38.2 19.6 - 45.3 %    Monocyte % 9.7 5.0 - 12.0 %    Eosinophil % 4.8 0.3 - 6.2 %    Basophil % 0.9 0.0 - 1.5 %    Immature Grans % 0.3 0.0 - 0.5 %    Neutrophils, Absolute 2.71 1.70 - 7.00 10*3/mm3    Lymphocytes, Absolute 2.24 0.70 - 3.10 10*3/mm3    Monocytes, Absolute 0.57 0.10 - 0.90 10*3/mm3    Eosinophils, Absolute 0.28 0.00 - 0.40 10*3/mm3    Basophils, Absolute 0.05 0.00 - 0.20 10*3/mm3    Immature Grans, Absolute 0.02 0.00 - 0.05 10*3/mm3    nRBC 0.0 0.0 - 0.2 /100 WBC   ECG 12 Lead ED Triage Standing Order; Chest Pain    Collection Time: 08/06/24  3:21 AM   Result Value Ref Range    QT Interval 304 ms    QTC Interval 474 ms   ECG 12 Lead Rhythm Change    Collection Time: 08/06/24  4:33 AM   Result Value Ref Range    QT Interval 344 ms    QTC Interval 504 ms   High Sensitivity Troponin T 2Hr    Collection Time: 08/06/24  4:58 AM    Specimen: Blood   Result Value Ref Range    HS Troponin T 14 (H) <14 ng/L    Troponin T Delta -2 >=-4 - <+4 ng/L   ECG 12 Lead Rhythm Change    Collection Time:  08/06/24  5:38 AM   Result Value Ref Range    QT Interval 419 ms    QTC Interval 412 ms       The above labs were ordered by me and independently reviewed by me.     RADIOLOGY  XR Chest 1 View    Result Date: 8/6/2024  SINGLE VIEW OF THE CHEST  HISTORY: Chest pain  COMPARISON: None available.  FINDINGS: There is cardiomegaly. There appears to be some vascular congestion. No pneumothorax or pleural effusion is seen. No acute infiltrates are identified.      Cardiomegaly with suspected vascular congestion.  This report was finalized on 8/6/2024 3:43 AM by Dr. Kiera Trejo M.D on Workstation: [id number]       The above radiology studies were ordered by me.  See ED course for independent interpretations.     MEDICATIONS GIVEN IN ER  Medications   sodium chloride 0.9 % flush 10 mL (has no administration in time range)   aspirin tablet 325 mg (325 mg Oral Not Given 8/6/24 0327)   dilTIAZem (CARDIZEM) injection 20 mg (20 mg Intravenous Given 8/6/24 0407)   ondansetron (ZOFRAN) injection 4 mg (4 mg Intravenous Given 8/6/24 0451)   metoprolol tartrate (LOPRESSOR) injection 5 mg (5 mg Intravenous Given 8/6/24 0451)   metoprolol tartrate (LOPRESSOR) tablet 25 mg (25 mg Oral Given 8/6/24 0451)         ORDERS PLACED DURING THIS VISIT:  Orders Placed This Encounter   Procedures    XR Chest 1 View    [facility] Draw    Comprehensive Metabolic Panel    High Sensitivity Troponin T    CBC Auto Differential    High Sensitivity Troponin T 2Hr    Ambulatory Referral to Cardiology    NPO Diet NPO Type: Strict NPO    Undress & Gown    Continuous Pulse Oximetry    Oxygen Therapy- Nasal Cannula; Titrate 1-6 LPM Per SpO2; 90 - 95%    ECG 12 Lead ED Triage Standing Order; Chest Pain    ECG 12 Lead ED Triage Standing Order; Chest Pain    ECG 12 Lead Rhythm Change    ECG 12 Lead Rhythm Change    Telemetry Scan    Insert Peripheral IV    CBC & Differential    Green Top (Gel)    Lavender Top    Gold Top - SST    Light Blue Top          OUTPATIENT MEDICATION MANAGEMENT:  Current Facility-Administered Medications Ordered in Epic   Medication Dose Route Frequency Provider Last Rate Last Admin    aspirin tablet 325 mg  325 mg Oral Once Sean Lopez MD        sodium chloride 0.9 % flush 10 mL  10 mL Intravenous PRN Sean Lopez MD         Current Outpatient Medications Ordered in Epic   Medication Sig Dispense Refill    acetaminophen (TYLENOL) 650 MG 8 hr tablet Take 2 tablets by mouth Every 8 (Eight) Hours As Needed for Mild Pain.      aspirin 81 MG EC tablet Take 1 tablet by mouth Daily.      atorvastatin (LIPITOR) 40 MG tablet TAKE 1 TABLET BY MOUTH DAILY 90 tablet 3    calcium carbonate (OS-JERRELL) 1250 (500 Ca) MG tablet Take 1 tablet by mouth 2 (Two) Times a Day.      carvedilol (COREG) 12.5 MG tablet Take 1 tablet by mouth 2 (Two) Times a Day With Meals. 60 tablet 0    hydrALAZINE (APRESOLINE) 10 MG tablet Take 1 tablet by mouth Every 6 (Six) Hours As Needed (SBP > 180). 30 tablet 0    pantoprazole (PROTONIX) 40 MG EC tablet TAKE 1 TABLET BY MOUTH EVERY MORNING 90 tablet 3    saccharomyces boulardii (FLORASTOR) 250 MG capsule Take 1 capsule by mouth 2 (Two) Times a Day. 60 capsule 0    valsartan (DIOVAN) 160 MG tablet TAKE 1 TABLET BY MOUTH TWICE DAILY 180 tablet 3    VITAMIN E 1000 UNIT capsule Take 4 capsules by mouth Daily.           PROCEDURES  Procedures      Critical care provider statement:    Critical care time (minutes): 50.   Critical care time was exclusive of:  Separately billable procedures and treating other patients   Critical care was necessary to treat or prevent imminent or life-threatening deterioration of the following conditions:  Cardiac Failure   Critical care was time spent personally by me on the following activities:  Development of treatment plan with patient or surrogate, discussions with consultants, evaluation of patient's response to treatment, examination of patient, obtaining history from  patient or surrogate, ordering and performing treatments and interventions, ordering and review of laboratory studies, ordering and review of radiographic studies, pulse oximetry, re-evaluation of patient's condition and review of old charts. Critical Care indicators: Atrial Fibrillation with Tachycardia Anti-arrhythmics: adenocard, adenosine, amiodarone, atropine, bretylium, cardizem, digoxin, inderal, lidocaine, magnesium sulfate, procainamide, verapamil, et al.      PROGRESS, DATA ANALYSIS, CONSULTS, AND MEDICAL DECISION MAKING  All labs have been independently interpreted by me.  All radiology studies have been reviewed by me. All EKG's have been independently viewed and interpreted by me.  Discussion below represents my analysis of pertinent findings related to patient's condition, differential diagnosis, treatment plan and final disposition.    Differential diagnosis includes but is not limited to:  Acute coronary syndrome  A-fib with RVR  Atrial flutter  Pulmonary embolism      Clinical Scores:                  ED Course as of 08/06/24 0711   Tue Aug 06, 2024   0351 Prior cardiac cath reviewed from 4/4/2022.  Patient had mild to moderate nonobstructive coronary disease with a bad lesion in the second diagonal but not amenable to PCI.  Echo reviewed from 10/6/2021 that showed EF 68%, grade 1 diastolic dysfunction. [JR]   0423 EKG          EKG time: 3:21 AM  Rhythm/Rate: A-fib with RVR, rate 146  P waves and HI: N/A  QRS, axis: Normal axis  ST and T waves: Borderline ST depression laterally    Interpreted Contemporaneously by me, independently viewed  Similar compared to prior 8/23/2023 however A-fib is new today and rate is faster.       [JR]   0541 HS Troponin T(!): 14 [JR]   0541 Troponin T Delta: -2 [JR]   0541 EKG          EKG time: 5:38 AM  Rhythm/Rate: Sinus rhythm, 58  P waves and HI: Normal  QRS, axis: Normal axis  ST and T waves: No acute ischemic changes    Interpreted Contemporaneously by me,  independently viewed  Similar compared to prior earlier today however A-fib has resolved and the rate is slower with       [JR]   0630 Patient reassessed.  She reports feeling much better.  She remains in sinus rhythm with rate in the 50s.  I explained that she has a reassuring EKG and serial cardiac troponins.  Considering these findings and also recent catheterization with mild nonobstructive coronary disease, I think that she is appropriate discharge home with close outpatient follow-up with her cardiologist.  I will defer the decision on oral anticoagulation to her cardiologist considering that she is no longer in atrial fibrillation at this time. [JR]      ED Course User Index  [JR] Sean Lopez MD             AS OF 07:11 EDT VITALS:    BP - 164/70  HR - 53  TEMP - 98.1 °F (36.7 °C)  O2 SATS - 94%    COMPLEXITY OF CARE  Admission was considered but after careful review of the patient's presentation, physical examination, diagnostic results, and response to treatment the patient may be safely discharged with outpatient follow-up.      Chronic or social conditions impacting patient care (Social Determinants of Health):     DIAGNOSIS  Final diagnoses:   Atypical chest pain   Atrial fibrillation with rapid ventricular response   History of coronary artery disease           DISPOSITION  DISCHARGE    Patient discharged in stable condition.    Reviewed implications of results, diagnosis, meds, responsibility to follow up, warning signs and symptoms of possible worsening, potential complications and reasons to return to ER.    Patient/Family voiced understanding of above instructions.    Discussed plan for discharge, as there is no emergent indication for admission. Patient referred to primary care provider for BP management due to today's BP. Pt/family is agreeable and understands need for follow up and repeat testing.  Pt is aware that discharge does not mean that nothing is wrong but it indicates no  emergency is present that requires admission and they must continue care with follow-up as given below or physician of their choice.     FOLLOW-UP  Kingsley Zimmerman MD  [facility] [place] [address]  [phone]    Schedule an appointment as soon as possible for a visit            Medication List      No changes were made to your prescriptions during this visit.             Prescription drug monitoring program review:           Please note that portions of this document were completed with a voice recognition program.    Note Disclaimer: At [facility], we believe that sharing information builds trust and better relationships. You are receiving this note because you recently visited [facility]. It is possible you will see health information before a provider has talked with you about it. This kind of information can be easy to misunderstand. To help you fully understand what it means for your health, we urge you to discuss this note with your provider.         Sean Lopez MD  08/06/24 0776

## 2024-08-06 NOTE — Clinical Note
[facility] EMERGENCY DEPARTMENT  [address] [facility] [address]  Phone: [phone]    Rosario Lopez accompanied Della Vines to the emergency department on 8/6/2024. They may return to work on 08/07/2024.        Thank you for choosing [facility].    Teri Mitchell RN

## 2024-08-06 NOTE — TELEPHONE ENCOUNTER
Caller: Della Vines    Relationship: Self    Best call back number: [phone]    What is the best time to reach you: ANYTIME    Who are you requesting to speak with (clinical staff, provider,  specific staff member): CLINICAL        What was the call regarding: PT WENT TO ED THIS MORNING BY AMBULANCE  FOR CHEST PAINS, HIGH /143, AND FACE HURTING AND SHE WAS IN AFIB.  THEY GAVE HER MEDICATION TO BRING IT DOWN.   FIRST AVAILABLE APPT IS 08/12/2024 WITH ERIBERTO SHAFER.   DOES SHE NEED TO BE SEEN TODAY?  PLEASE CALL PT BACK

## 2024-08-07 ENCOUNTER — TELEPHONE (OUTPATIENT)
Age: 79
End: 2024-08-07

## 2024-08-07 NOTE — TELEPHONE ENCOUNTER
Hub staff attempted to follow warm transfer process and was unsuccessful     Caller: Della Vines    Relationship to patient: Self    Best call back number: [phone]    Patient is needing: PT IS CALLING IN TO SCHEDULE ECHO TEST- TRIED TRANSFERRING BUT PT CALLED BACK IN SAYING SHE DIDN'T GET THROUGH TO ANYBODY. CAN SOMEONE GIVE HER A CALL?

## 2024-08-07 NOTE — TELEPHONE ENCOUNTER
Can someone get her scheduled for a Echo please. She is seeing Ellen 08/12/24. Is there anyway she can get the Echo same day? If not, that is fine. Thanks

## 2024-08-08 ENCOUNTER — HOSPITAL ENCOUNTER (OUTPATIENT)
Dept: CARDIOLOGY | Facility: HOSPITAL | Age: 79
Discharge: HOME | End: 2024-08-08
Admitting: INTERNAL MEDICINE
Payer: MEDICARE

## 2024-08-08 VITALS
HEIGHT: 63 IN | WEIGHT: 118 LBS | SYSTOLIC BLOOD PRESSURE: 200 MMHG | BODY MASS INDEX: 20.91 KG/M2 | DIASTOLIC BLOOD PRESSURE: 100 MMHG | HEART RATE: 63 BPM

## 2024-08-08 DIAGNOSIS — I48.0 PAROXYSMAL ATRIAL FIBRILLATION: ICD-10-CM

## 2024-08-08 LAB
AORTIC ARCH: 1.9 CM
ASCENDING AORTA: 2.9 CM
BH CV ECHO MEAS - ACS: 1.46 CM
BH CV ECHO MEAS - AO MAX PG: 6.8 MMHG
BH CV ECHO MEAS - AO MEAN PG: 3.4 MMHG
BH CV ECHO MEAS - AO ROOT DIAM: 2.9 CM
BH CV ECHO MEAS - AO V2 MAX: 130.3 CM/SEC
BH CV ECHO MEAS - AO V2 VTI: 31.9 CM
BH CV ECHO MEAS - AVA(I,D): 1.4 CM2
BH CV ECHO MEAS - EDV(CUBED): 59.3 ML
BH CV ECHO MEAS - EDV(MOD-SP2): 50 ML
BH CV ECHO MEAS - EDV(MOD-SP4): 39 ML
BH CV ECHO MEAS - EF(MOD-BP): 61.3 %
BH CV ECHO MEAS - EF(MOD-SP2): 62 %
BH CV ECHO MEAS - EF(MOD-SP4): 56.4 %
BH CV ECHO MEAS - ESV(CUBED): 13.9 ML
BH CV ECHO MEAS - ESV(MOD-SP2): 19 ML
BH CV ECHO MEAS - ESV(MOD-SP4): 17 ML
BH CV ECHO MEAS - FS: 38.3 %
BH CV ECHO MEAS - IVS/LVPW: 1 CM
BH CV ECHO MEAS - IVSD: 1.2 CM
BH CV ECHO MEAS - LAT PEAK E' VEL: 11.5 CM/SEC
BH CV ECHO MEAS - LV DIASTOLIC VOL/BSA (35-75): 25.2 CM2
BH CV ECHO MEAS - LV MASS(C)D: 159.3 GRAMS
BH CV ECHO MEAS - LV MAX PG: 1.76 MMHG
BH CV ECHO MEAS - LV MEAN PG: 0.95 MMHG
BH CV ECHO MEAS - LV SYSTOLIC VOL/BSA (12-30): 11 CM2
BH CV ECHO MEAS - LV V1 MAX: 66.4 CM/SEC
BH CV ECHO MEAS - LV V1 VTI: 17.4 CM
BH CV ECHO MEAS - LVIDD: 3.9 CM
BH CV ECHO MEAS - LVIDS: 2.41 CM
BH CV ECHO MEAS - LVOT AREA: 2.6 CM2
BH CV ECHO MEAS - LVOT DIAM: 1.81 CM
BH CV ECHO MEAS - LVPWD: 1.2 CM
BH CV ECHO MEAS - MED PEAK E' VEL: 6 CM/SEC
BH CV ECHO MEAS - MR MAX PG: 127.2 MMHG
BH CV ECHO MEAS - MR MAX VEL: 563.9 CM/SEC
BH CV ECHO MEAS - MV A DUR: 0.12 SEC
BH CV ECHO MEAS - MV A MAX VEL: 54.8 CM/SEC
BH CV ECHO MEAS - MV DEC SLOPE: 412.7 CM/SEC2
BH CV ECHO MEAS - MV DEC TIME: 0.11 SEC
BH CV ECHO MEAS - MV E MAX VEL: 90.8 CM/SEC
BH CV ECHO MEAS - MV E/A: 1.66
BH CV ECHO MEAS - MV MAX PG: 5 MMHG
BH CV ECHO MEAS - MV MEAN PG: 1.23 MMHG
BH CV ECHO MEAS - MV P1/2T: 80.9 MSEC
BH CV ECHO MEAS - MV V2 VTI: 27.5 CM
BH CV ECHO MEAS - MVA(P1/2T): 2.7 CM2
BH CV ECHO MEAS - MVA(VTI): 1.62 CM2
BH CV ECHO MEAS - PA ACC TIME: 0.13 SEC
BH CV ECHO MEAS - PA V2 MAX: 72.1 CM/SEC
BH CV ECHO MEAS - PULM A REVS DUR: 0.12 SEC
BH CV ECHO MEAS - PULM A REVS VEL: 25.7 CM/SEC
BH CV ECHO MEAS - PULM DIAS VEL: 52.7 CM/SEC
BH CV ECHO MEAS - PULM S/D: 0.61
BH CV ECHO MEAS - PULM SYS VEL: 32.1 CM/SEC
BH CV ECHO MEAS - QP/QS: 1.2
BH CV ECHO MEAS - RAP SYSTOLE: 3 MMHG
BH CV ECHO MEAS - RV MAX PG: 2 MMHG
BH CV ECHO MEAS - RV V1 MAX: 70.7 CM/SEC
BH CV ECHO MEAS - RV V1 VTI: 17 CM
BH CV ECHO MEAS - RVOT DIAM: 2 CM
BH CV ECHO MEAS - RVSP: 58 MMHG
BH CV ECHO MEAS - SUP REN AO DIAM: 1.6 CM
BH CV ECHO MEAS - SV(LVOT): 44.6 ML
BH CV ECHO MEAS - SV(MOD-SP2): 31 ML
BH CV ECHO MEAS - SV(MOD-SP4): 22 ML
BH CV ECHO MEAS - SV(RVOT): 53.4 ML
BH CV ECHO MEAS - SVI(LVOT): 28.8 ML/M2
BH CV ECHO MEAS - SVI(MOD-SP2): 20.1 ML/M2
BH CV ECHO MEAS - SVI(MOD-SP4): 14.2 ML/M2
BH CV ECHO MEAS - TAPSE (>1.6): 2.34 CM
BH CV ECHO MEAS - TR MAX PG: 54.5 MMHG
BH CV ECHO MEAS - TR MAX VEL: 369.2 CM/SEC
BH CV ECHO MEASUREMENTS AVERAGE E/E' RATIO: 10.38
BH CV XLRA - RV BASE: 3.3 CM
BH CV XLRA - RV LENGTH: 5.5 CM
BH CV XLRA - RV MID: 3 CM
BH CV XLRA - TDI S': 10.9 CM/SEC
LEFT ATRIUM VOLUME INDEX: 33.4 ML/M2
SINUS: 2.7 CM
STJ: 2.32 CM

## 2024-08-08 PROCEDURE — 93306 TTE W/DOPPLER COMPLETE: CPT

## 2024-08-12 ENCOUNTER — OFFICE VISIT (OUTPATIENT)
Dept: CARDIOLOGY | Facility: CLINIC | Age: 79
End: 2024-08-12
Payer: MEDICARE

## 2024-08-12 VITALS
HEART RATE: 54 BPM | DIASTOLIC BLOOD PRESSURE: 80 MMHG | SYSTOLIC BLOOD PRESSURE: 160 MMHG | WEIGHT: 116 LBS | BODY MASS INDEX: 20.55 KG/M2 | HEIGHT: 63 IN

## 2024-08-12 DIAGNOSIS — I25.110 CORONARY ARTERY DISEASE INVOLVING NATIVE CORONARY ARTERY OF NATIVE HEART WITH UNSTABLE ANGINA PECTORIS: Primary | ICD-10-CM

## 2024-08-12 PROCEDURE — 99214 OFFICE O/P EST MOD 30 MIN: CPT | Performed by: NURSE PRACTITIONER

## 2024-08-12 PROCEDURE — 3079F DIAST BP 80-89 MM HG: CPT | Performed by: NURSE PRACTITIONER

## 2024-08-12 PROCEDURE — 93000 ELECTROCARDIOGRAM COMPLETE: CPT | Performed by: NURSE PRACTITIONER

## 2024-08-12 PROCEDURE — 3077F SYST BP >= 140 MM HG: CPT | Performed by: NURSE PRACTITIONER

## 2024-08-12 RX ORDER — THIAMINE HCL 100 MG
TABLET ORAL DAILY
COMMUNITY

## 2024-08-12 NOTE — PROGRESS NOTES
Subjective:     Encounter Date:08/12/2024      Patient ID: Della Vines is a 78 y.o. female.    Chief Complaint: ER follow-up A-fib  History of Present Illness  This is a 78-year-old female who follows with Dr. Zimmerman and is new to me today.  She has a past medical history of coronary artery disease s/p PCI to LAD in 2020, TIAs, hypertension, hyperlipidemia and chronic lung disease.    Back in December, Dr. Zimmerman saw the patient in the office and her blood pressure was noted to be elevated.  Her valsartan was increased.  She then followed up with WALTER Flower in March and was doing very well.  No changes were made at that time.    On August 6, she presented to the emergency room with acute onset of chest pain.  She described a burning sensation in her jaw and pressure in her chest.  Her blood pressure was noted to be significantly elevated at 194/94 and her heart rate was in the 150s and she was found to be in atrial fibrillation.  Her troponins were negative.  She spontaneously converted to sinus rhythm with rates in the 50s.  She was discharged home with instructions to follow-up with her cardiologist for recommendations on anticoagulation.    She is here today for her follow-up visit.  She did speak with Dr. Zimmerman last week who sent in a prescription for apixaban for anticoagulation.  He also placed a referral to the electrophysiology team for management of her atrial fibrillation given her history of bradycardia and structural heart disease with coronary disease and prior stenting as well as history of cardiomyopathy.  She has been taking the Eliquis and reports that she is not having any bleeding issues with hematuria or melena.  She has been feeling better since she was seen in the emergency room.  She says that the symptoms that she has prompted her to go for very similar to what she experienced prior to her stent placement in the past.  She was having jaw pain and chest pain.  She does  report that she is had shortness of breath but feels this is overall stable.  No palpitations, dizziness or syncope.  She denies any swelling in her lower extremities, orthopnea or PND.  She has been monitoring her blood pressure at home but she is taking it at random times throughout the day.  It seems to be very well-controlled in the morning but in the afternoon she is getting readings in the 180s to 190s.  Denies feeling bad or having a headache with elevated blood pressures.    I have reviewed and updated as appropriate allergies, current medications, past family history, past medical history, past surgical history and problem list.    Review of Systems   Constitutional: Positive for malaise/fatigue. Negative for fever, weight gain and weight loss.   HENT:  Negative for congestion, hoarse voice and sore throat.    Eyes:  Negative for blurred vision and double vision.   Cardiovascular:  Negative for chest pain, dyspnea on exertion, leg swelling, orthopnea, palpitations and syncope.   Respiratory:  Positive for shortness of breath. Negative for cough and wheezing.    Gastrointestinal:  Negative for abdominal pain, hematemesis, hematochezia and melena.   Genitourinary:  Negative for dysuria and hematuria.   Neurological:  Negative for dizziness, headaches, light-headedness and numbness.   Psychiatric/Behavioral:  Negative for depression. The patient is not nervous/anxious.          Current Outpatient Medications:     acetaminophen (TYLENOL) 650 MG 8 hr tablet, Take 2 tablets by mouth Every 8 (Eight) Hours As Needed for Mild Pain., Disp: , Rfl:     apixaban (ELIQUIS) 5 MG tablet tablet, Take 1 tablet by mouth Every 12 (Twelve) Hours., Disp: 180 tablet, Rfl: 3    atorvastatin (LIPITOR) 40 MG tablet, TAKE 1 TABLET BY MOUTH DAILY, Disp: 90 tablet, Rfl: 3    calcium carbonate (OS-JERRELL) 1250 (500 Ca) MG tablet, Take 1 tablet by mouth 2 (Two) Times a Day., Disp: , Rfl:     Calcium Citrate-Vitamin D3 (CITRACAL) 315-6.25  MG-MCG tablet tablet, Take  by mouth Daily., Disp: , Rfl:     carvedilol (COREG) 12.5 MG tablet, Take 1 tablet by mouth 2 (Two) Times a Day With Meals., Disp: 60 tablet, Rfl: 0    hydrALAZINE (APRESOLINE) 10 MG tablet, Take 1 tablet by mouth Every 6 (Six) Hours As Needed (SBP > 180)., Disp: 30 tablet, Rfl: 0    pantoprazole (PROTONIX) 40 MG EC tablet, TAKE 1 TABLET BY MOUTH EVERY MORNING, Disp: 90 tablet, Rfl: 3    valsartan (DIOVAN) 160 MG tablet, TAKE 1 TABLET BY MOUTH TWICE DAILY, Disp: 180 tablet, Rfl: 3    aspirin 81 MG EC tablet, Take 1 tablet by mouth Daily. (Patient not taking: Reported on 8/12/2024), Disp: , Rfl:     saccharomyces boulardii (FLORASTOR) 250 MG capsule, Take 1 capsule by mouth 2 (Two) Times a Day. (Patient not taking: Reported on 8/12/2024), Disp: 60 capsule, Rfl: 0    VITAMIN E 1000 UNIT capsule, Take 4 capsules by mouth Daily. (Patient not taking: Reported on 8/12/2024), Disp: , Rfl:     Past Medical History:   Diagnosis Date    Abnormal stress test 01/13/2020    Added automatically from request for surgery [id number]    Acid reflux disease     Arthritis     Bell's palsy     Bronchiectasis without complication     Dr Campbell    Cerebellar infarction     Chest pain     CVA (cerebral vascular accident) 05/19/2014    Overview:  Per old records in distant past in internal capsule    History of ANTONY infection 08/23/2023    history of ANTONY but AFB negative on bronchoscopy 11/2018.     Hyperlipidemia     Hypertension     Lung disease     Osteoporosis     PAF (paroxysmal atrial fibrillation)     Skin cancer     Stroke     TIA (transient ischemic attack)     Urinary frequency     wears pads    Urinary tract infection        Past Surgical History:   Procedure Laterality Date    ADENOIDECTOMY      APPENDECTOMY      BREAST FIBROADENOMA SURGERY      BRONCHOSCOPY      CARDIAC CATHETERIZATION N/A 01/17/2020    Procedure: Left Heart Cath;  Surgeon: Kingsley Zimmerman MD;  Location: [facility] INVASIVE  LOCATION;  Service: Cardiovascular    CARDIAC CATHETERIZATION N/A 01/17/2020    Procedure: Coronary angiography;  Surgeon: Kingsley Zimmerman MD;  Location:  [facility] CATH INVASIVE LOCATION;  Service: Cardiovascular    CARDIAC CATHETERIZATION N/A 01/17/2020    Procedure: Left ventriculography;  Surgeon: Kingsley Zimmerman MD;  Location:  [facility]ATH INVASIVE LOCATION;  Service: Cardiovascular    CARDIAC CATHETERIZATION N/A 01/17/2020    Procedure: Percutaneous Coronary Intervention;  Surgeon: Kingsley Zimmerman MD;  Location:  [facility]ATH INVASIVE LOCATION;  Service: Cardiovascular    CARDIAC CATHETERIZATION N/A 01/17/2020    Procedure: Stent GERA coronary;  Surgeon: Kingsley Zimmerman MD;  Location:  [facility] INVASIVE LOCATION;  Service: Cardiovascular    CARDIAC CATHETERIZATION N/A 04/04/2022    Procedure: Coronary angiography;  Surgeon: Kvng Burgos MD;  Location:  [facility]ATH INVASIVE LOCATION;  Service: Cardiovascular;  Laterality: N/A;    CARDIAC CATHETERIZATION N/A 04/04/2022    Procedure: Left heart cath;  Surgeon: Kvng Burgos MD;  Location:  [facility] INVASIVE LOCATION;  Service: Cardiovascular;  Laterality: N/A;    CARDIAC CATHETERIZATION N/A 04/04/2022    Procedure: Left ventriculography;  Surgeon: Kvng Burgos MD;  Location:  [facility] INVASIVE LOCATION;  Service: Cardiovascular;  Laterality: N/A;    COLONOSCOPY      HYSTERECTOMY      INTERSTIM PLACEMENT N/A 7/6/2023    Procedure: INTERSTIM STAGE 1/2;  Surgeon: Prasanna Alfaro MD;  Location: [facility] MAIN OR;  Service: Urology;  Laterality: N/A;    INTERSTIM PLACEMENT N/A 7/6/2023    Procedure: INTERSTIM STAGE 1/2;  Surgeon: Prasanna Alfaro MD;  Location: [facility] MAIN OR;  Service: Urology;  Laterality: N/A;    MASTOID SURGERY      TONSILLECTOMY         Family History   Problem Relation Age of Onset    Breast cancer Mother     Heart failure Father     Hypertension Father     Heart disease Father     Aneurysm Daughter     Adama  "Hyperthermia Neg Hx        Social History     Tobacco Use    Smoking status: Former     Current packs/day: 0.00     Average packs/day: 1.5 packs/day for 30.0 years (45.0 ttl pk-yrs)     Types: Cigarettes     Start date:      Quit date:      Years since quittin.6    Smokeless tobacco: Never    Tobacco comments:     caffeine use: 1 CUP COFFEE DAILY    Vaping Use    Vaping status: Never Used   Substance Use Topics    Alcohol use: Not Currently     Comment: quit 42 years ago    Drug use: Not Currently     Comment: 42 years ago         ECG 12 Lead    Date/Time: 2024 12:51 PM  Performed by: Ellen Duong APRN    Authorized by: Ellen Duong APRN  Comparison: compared with previous ECG from 2024  Similar to previous ECG  Rhythm: sinus rhythm             Objective:     Visit Vitals  /80   Pulse 54   Ht 160 cm (63\")   Wt 52.6 kg (116 lb)   BMI 20.55 kg/m²             Physical Exam  Constitutional:       Appearance: Normal appearance. She is normal weight.   HENT:      Head: Normocephalic.   Neck:      Vascular: No carotid bruit.   Cardiovascular:      Rate and Rhythm: Normal rate and regular rhythm.      Chest Wall: PMI is not displaced.      Pulses: Normal pulses.      Heart sounds: No murmur heard.     No friction rub. No gallop.   Pulmonary:      Effort: Pulmonary effort is normal.      Breath sounds: Normal breath sounds.   Abdominal:      General: Bowel sounds are normal. There is no distension.      Palpations: Abdomen is soft.   Musculoskeletal:      Right lower leg: No edema.      Left lower leg: No edema.   Skin:     General: Skin is warm and dry.      Capillary Refill: Capillary refill takes less than 2 seconds.   Neurological:      Mental Status: She is alert and oriented to person, place, and time.   Psychiatric:         Mood and Affect: Mood normal.         Behavior: Behavior normal.         Thought Content: Thought content normal.          Lab Review:         Cardiac Procedures: "       Assessment:         Diagnoses and all orders for this visit:    1. Coronary artery disease involving native coronary artery of native heart with unstable angina pectoris (Primary)  -     Stress Test With Myocardial Perfusion One Day; Future    Other orders  -     ECG 12 Lead            Plan:       PAF: in sinus on EKG. Referral placed to EP by Dr. Zimmerman last week. She is on apixaban and carvedilol. Will await further recommendations from EP.  CAD: with stable angina. EKG is stable. However, I am concerned that the symptoms she had that prompted the ED visit are similar to what she experienced when her stent was placed. Will obtain a nuclear stress test. Also, resumed aspirin as she thought she was to stop this when she started apixaban.  HTN: blood pressure is variable. I have asked her to check it 1-2 hours after her am and pm medications and when I call with stress test results, we will go over the BP readings to determine if any adjustments need to be made.   HLD: on statin, Goal LDL < 70  History of CVA: on aspirin and statin    Thank you for allowing me to participate in this patient's care. Please call with any questions or concerns. Mrs Vines will follow up with Dr. Zimmerman in 3 months.          Your medication list            Accurate as of August 12, 2024 12:53 PM. If you have any questions, ask your nurse or doctor.                CONTINUE taking these medications        Instructions Last Dose Given Next Dose Due   acetaminophen 650 MG 8 hr tablet  Commonly known as: TYLENOL      Take 2 tablets by mouth Every 8 (Eight) Hours As Needed for Mild Pain.       apixaban 5 MG tablet tablet  Commonly known as: ELIQUIS      Take 1 tablet by mouth Every 12 (Twelve) Hours.       aspirin 81 MG EC tablet      Take 1 tablet by mouth Daily.       atorvastatin 40 MG tablet  Commonly known as: LIPITOR      TAKE 1 TABLET BY MOUTH DAILY       calcium carbonate 1250 (500 Ca) MG tablet  Commonly known as: OS-JERRELL       Take 1 tablet by mouth 2 (Two) Times a Day.       Calcium Citrate-Vitamin D3 315-6.25 MG-MCG tablet tablet  Commonly known as: CITRACAL      Take  by mouth Daily.       carvedilol 12.5 MG tablet  Commonly known as: COREG      Take 1 tablet by mouth 2 (Two) Times a Day With Meals.       Florastor 250 MG capsule  Generic drug: saccharomyces boulardii      Take 1 capsule by mouth 2 (Two) Times a Day.       hydrALAZINE 10 MG tablet  Commonly known as: APRESOLINE      Take 1 tablet by mouth Every 6 (Six) Hours As Needed (SBP > 180).       pantoprazole 40 MG EC tablet  Commonly known as: PROTONIX      TAKE 1 TABLET BY MOUTH EVERY MORNING       valsartan 160 MG tablet  Commonly known as: DIOVAN      TAKE 1 TABLET BY MOUTH TWICE DAILY       vitamin E 1000 UNIT capsule      Take 4 capsules by mouth Daily.                  WALTER Dudley  08/12/24  10:06 AM EDT

## 2024-08-16 ENCOUNTER — TELEPHONE (OUTPATIENT)
Dept: CARDIOLOGY | Facility: CLINIC | Age: 79
End: 2024-08-16
Payer: MEDICARE

## 2024-08-19 ENCOUNTER — TELEPHONE (OUTPATIENT)
Dept: CARDIOLOGY | Facility: CLINIC | Age: 79
End: 2024-08-19
Payer: MEDICARE

## 2024-08-19 ENCOUNTER — HOSPITAL ENCOUNTER (OUTPATIENT)
Dept: CARDIOLOGY | Facility: HOSPITAL | Age: 79
Discharge: HOME | End: 2024-08-19
Admitting: NURSE PRACTITIONER
Payer: MEDICARE

## 2024-08-19 VITALS — BODY MASS INDEX: 20.7 KG/M2 | WEIGHT: 116.84 LBS | HEIGHT: 63 IN

## 2024-08-19 DIAGNOSIS — I25.110 CORONARY ARTERY DISEASE INVOLVING NATIVE CORONARY ARTERY OF NATIVE HEART WITH UNSTABLE ANGINA PECTORIS: ICD-10-CM

## 2024-08-19 LAB
BH CV NUCLEAR PRIOR STUDY: 1
BH CV REST NUCLEAR ISOTOPE DOSE: 10.5 MCI
BH CV STRESS BP STAGE 1: NORMAL
BH CV STRESS DURATION MIN STAGE 1: 3
BH CV STRESS DURATION MIN STAGE 2: 0
BH CV STRESS DURATION SEC STAGE 1: 0
BH CV STRESS DURATION SEC STAGE 2: 19
BH CV STRESS GRADE STAGE 1: 10
BH CV STRESS GRADE STAGE 2: 12
BH CV STRESS HR STAGE 1: 98
BH CV STRESS HR STAGE 2: 102
BH CV STRESS METS STAGE 1: 5
BH CV STRESS METS STAGE 2: 5
BH CV STRESS NUCLEAR ISOTOPE DOSE: 34.9 MCI
BH CV STRESS PROTOCOL 1: NORMAL
BH CV STRESS PROTOCOL 2 BP STAGE 1: NORMAL
BH CV STRESS PROTOCOL 2 COMMENTS STAGE 1: NORMAL
BH CV STRESS PROTOCOL 2 DOSE REGADENOSON STAGE 1: 0.4
BH CV STRESS PROTOCOL 2 DURATION MIN STAGE 1: 0
BH CV STRESS PROTOCOL 2 DURATION SEC STAGE 1: 10
BH CV STRESS PROTOCOL 2 HR STAGE 1: 108
BH CV STRESS PROTOCOL 2 STAGE 1: 1
BH CV STRESS PROTOCOL 2: NORMAL
BH CV STRESS RECOVERY BP: NORMAL MMHG
BH CV STRESS RECOVERY HR: 69 BPM
BH CV STRESS SPEED STAGE 1: 1.7
BH CV STRESS SPEED STAGE 2: 2.5
BH CV STRESS STAGE 1: 1
BH CV STRESS STAGE 2: 2
LV EF NUC BP: 71 %
MAXIMAL PREDICTED HEART RATE: 143 BPM
PERCENT MAX PREDICTED HR: 75.52 %
STRESS BASELINE BP: NORMAL MMHG
STRESS BASELINE HR: 63 BPM
STRESS PERCENT HR: 89 %
STRESS POST EXERCISE DUR SEC: 10 SEC
STRESS POST PEAK BP: NORMAL MMHG
STRESS POST PEAK HR: 108 BPM
STRESS TARGET HR: 122 BPM

## 2024-08-19 PROCEDURE — 78452 HT MUSCLE IMAGE SPECT MULT: CPT | Performed by: INTERNAL MEDICINE

## 2024-08-19 PROCEDURE — A9502 TC99M TETROFOSMIN: HCPCS | Performed by: NURSE PRACTITIONER

## 2024-08-19 PROCEDURE — 93018 CV STRESS TEST I&R ONLY: CPT | Performed by: INTERNAL MEDICINE

## 2024-08-19 PROCEDURE — 93016 CV STRESS TEST SUPVJ ONLY: CPT | Performed by: INTERNAL MEDICINE

## 2024-08-19 PROCEDURE — 25010000002 AMINOPHYLLINE PER 250 MG: Performed by: NURSE PRACTITIONER

## 2024-08-19 PROCEDURE — 25010000002 REGADENOSON 0.4 MG/5ML SOLUTION: Performed by: NURSE PRACTITIONER

## 2024-08-19 PROCEDURE — 78452 HT MUSCLE IMAGE SPECT MULT: CPT

## 2024-08-19 PROCEDURE — 0 TECHNETIUM TETROFOSMIN KIT: Performed by: NURSE PRACTITIONER

## 2024-08-19 PROCEDURE — 93017 CV STRESS TEST TRACING ONLY: CPT

## 2024-08-19 RX ORDER — REGADENOSON 0.08 MG/ML
0.4 INJECTION, SOLUTION INTRAVENOUS
Status: COMPLETED | OUTPATIENT
Start: 2024-08-19 | End: 2024-08-19

## 2024-08-19 RX ORDER — AMINOPHYLLINE 25 MG/ML
125 INJECTION, SOLUTION INTRAVENOUS ONCE
Status: COMPLETED | OUTPATIENT
Start: 2024-08-19 | End: 2024-08-19

## 2024-08-19 RX ADMIN — TETROFOSMIN 1 DOSE: 1.38 INJECTION, POWDER, LYOPHILIZED, FOR SOLUTION INTRAVENOUS at 12:43

## 2024-08-19 RX ADMIN — AMINOPHYLLINE 125 MG: 25 INJECTION, SOLUTION INTRAVENOUS at 13:43

## 2024-08-19 RX ADMIN — TETROFOSMIN 1 DOSE: 1.38 INJECTION, POWDER, LYOPHILIZED, FOR SOLUTION INTRAVENOUS at 13:40

## 2024-08-19 RX ADMIN — REGADENOSON 0.4 MG: 0.08 INJECTION, SOLUTION INTRAVENOUS at 13:40

## 2024-08-19 NOTE — TELEPHONE ENCOUNTER
----- Message from Ellen Duong sent at 8/19/2024  3:45 PM EDT -----  Please let patient know that stress test shows no evidence of ischemia.

## 2024-08-19 NOTE — TELEPHONE ENCOUNTER
Called and left VM, will continue to try to reach pt.    HUB- please put patient straight through to triage    Karen Aguirre RN  Triage RN  08/19/24 15:52 EDT

## 2024-08-20 ENCOUNTER — TELEPHONE (OUTPATIENT)
Dept: CARDIOLOGY | Facility: CLINIC | Age: 79
End: 2024-08-20
Payer: MEDICARE

## 2024-08-20 NOTE — TELEPHONE ENCOUNTER
"Pt called back in to triage.  Pt states that she has been thinking about her stress test results, and she is concerned.  Pt states that her BP was high, and that she couldn't finish the treadmill test.  She states that this happened before, and that a heart cath showed blockage \"I got 2 stents in my  maker\".      Recommendations?    Thanks so much,  Karen Aguirre RN  Triage RN  08/20/24 08:48 EDT    "

## 2024-08-20 NOTE — TELEPHONE ENCOUNTER
Pt called back in to triage.  Results reviewed with pt.  Instructed to call with any further questions or concerns.  Verbalized understanding.    Karen Aguirre RN  [place] Nurse[place] [facility] [place]  08/20/24 08:25 EDT

## 2024-08-22 NOTE — TELEPHONE ENCOUNTER
Called and talked with patient.  I think her most recent episode of chest pain was precipitated by her atrial fibrillation with rapid ventricular response with a heart rate of 150.  She has not had any more atrial fibrillation discussed referral to EP think she wants to wait and watch she is a visit with me in October so we can hold off.  Unfortunately were somewhat limited in medications that we can use for her but we will see how she does on current regimen.  She did start anticoagulation which is good given her elevated risk for stroke.  I do not think we need to repeat a heart catheterization.  We similarly did this in 2022 as she had concerns that the stress test was not good to be accurate although her heart cath was fairly bland showing a patent stent.  She thinks her for stress test did not show anything however and actually was very abnormal with an ejection fraction in the 20% range likely due to severe ischemia.  I would watch her symptoms now if any issues we could reconsider heart catheterization but I do not think there is compelling reasons to do so unless any recurrent chest pain issues.  She will follow-up with us as scheduled

## 2024-08-29 ENCOUNTER — TELEPHONE (OUTPATIENT)
Dept: CARDIOLOGY | Facility: CLINIC | Age: 79
End: 2024-08-29
Payer: MEDICARE

## 2024-08-29 NOTE — TELEPHONE ENCOUNTER
Patient called and left . Would it be ok for her to take Mucinex?     She can be reached at [phone]

## 2024-09-03 ENCOUNTER — TELEPHONE (OUTPATIENT)
Dept: CARDIOLOGY | Facility: CLINIC | Age: 79
End: 2024-09-03
Payer: MEDICARE

## 2024-09-03 NOTE — TELEPHONE ENCOUNTER
Pt called and LVM asking how long does she need to be off of her Eliquis 5 mg and aspirin 81mg  she is having a periodontal cleaning . Please advise . Thanks KL

## 2024-09-03 NOTE — TELEPHONE ENCOUNTER
Called and talked with patient regarding her teeth cleaning.  She has a deep cleaning the last time she had it done on aspirin 81 mg she had a fair amount of bleeding.  With this being the case I would recommend she hold her Eliquis 2 days prior.  With regards to her aspirin I would not necessarily want her to hold it for too long given her stent for teeth cleaning.  She can hold it a day before hopefully this will help modulate some of her bleeding during the procedure.  Obviously no perfect answer for her.  Guideline recommendations would say no need to hold anything but given her past history of bleeding would be reasonable to try and hold some of her anticoagulation.  She will restart both the day after the procedure pending no issues

## 2024-10-17 ENCOUNTER — OFFICE VISIT (OUTPATIENT)
Age: 79
End: 2024-10-17
Payer: MEDICARE

## 2024-10-17 VITALS
WEIGHT: 115 LBS | SYSTOLIC BLOOD PRESSURE: 142 MMHG | HEIGHT: 63 IN | BODY MASS INDEX: 20.38 KG/M2 | HEART RATE: 51 BPM | DIASTOLIC BLOOD PRESSURE: 90 MMHG

## 2024-10-17 DIAGNOSIS — I25.10 CORONARY ARTERY DISEASE INVOLVING NATIVE CORONARY ARTERY OF NATIVE HEART WITHOUT ANGINA PECTORIS: Primary | ICD-10-CM

## 2024-10-17 DIAGNOSIS — I10 ESSENTIAL HYPERTENSION: ICD-10-CM

## 2024-10-17 DIAGNOSIS — I48.0 PAF (PAROXYSMAL ATRIAL FIBRILLATION): Chronic | ICD-10-CM

## 2024-10-17 DIAGNOSIS — E78.2 MIXED HYPERLIPIDEMIA: ICD-10-CM

## 2024-10-17 PROCEDURE — 93000 ELECTROCARDIOGRAM COMPLETE: CPT | Performed by: INTERNAL MEDICINE

## 2024-10-17 PROCEDURE — 3080F DIAST BP >= 90 MM HG: CPT | Performed by: INTERNAL MEDICINE

## 2024-10-17 PROCEDURE — 99214 OFFICE O/P EST MOD 30 MIN: CPT | Performed by: INTERNAL MEDICINE

## 2024-10-17 PROCEDURE — 1160F RVW MEDS BY RX/DR IN RCRD: CPT | Performed by: INTERNAL MEDICINE

## 2024-10-17 PROCEDURE — 3077F SYST BP >= 140 MM HG: CPT | Performed by: INTERNAL MEDICINE

## 2024-10-17 PROCEDURE — 1159F MED LIST DOCD IN RCRD: CPT | Performed by: INTERNAL MEDICINE

## 2024-10-17 RX ORDER — VALSARTAN 160 MG/1
160 TABLET ORAL 2 TIMES DAILY
Qty: 180 TABLET | Refills: 3 | Status: SHIPPED | OUTPATIENT
Start: 2024-10-17

## 2024-10-17 RX ORDER — CARVEDILOL 12.5 MG/1
12.5 TABLET ORAL 2 TIMES DAILY WITH MEALS
Qty: 180 TABLET | Refills: 3 | Status: SHIPPED | OUTPATIENT
Start: 2024-10-17

## 2024-10-17 NOTE — PROGRESS NOTES
[facility] Follow Up Office Note     Encounter Date:10/17/24  Patient:Della Vines  :1945  MRN:[id number]      Chief Complaint:   Chief Complaint   Patient presents with    Coronary Artery Disease     6 month f/u     History of Presenting Illness:      Ms. Vines is a 78 y.o. woman with past medical history notable for coronary artery disease status post percutaneous intervention, paroxysmal atrial fibrillation, TIAs, hypertension, mixed hyperlipidemia, and chronic lung disease who presents to my office for follow-up.  Overall is actually doing well from a cardiac perspective since her emergency room visit for atrial fibrillation.  Denies any recurrent atrial fibrillation blood pressure is actually doing better on her current regimen.  Not perfect but we are limited she really will not take much medications as a lot of intolerances.         Review of Systems:  Review of Systems   Constitutional: Negative.   HENT: Negative.     Eyes: Negative.    Cardiovascular: Negative.    Respiratory: Negative.     Endocrine: Negative.    Hematologic/Lymphatic: Negative.    Skin: Negative.    Musculoskeletal: Negative.    Gastrointestinal: Negative.    Genitourinary: Negative.    Neurological: Negative.    Psychiatric/Behavioral: Negative.     Allergic/Immunologic: Negative.        Current Outpatient Medications on File Prior to Visit   Medication Sig Dispense Refill    acetaminophen (TYLENOL) 650 MG 8 hr tablet Take 2 tablets by mouth Every 8 (Eight) Hours As Needed for Mild Pain.      apixaban (ELIQUIS) 5 MG tablet tablet Take 1 tablet by mouth Every 12 (Twelve) Hours. 180 tablet 3    aspirin 81 MG EC tablet Take 1 tablet by mouth Daily.      atorvastatin (LIPITOR) 40 MG tablet TAKE 1 TABLET BY MOUTH DAILY 90 tablet 3    calcium carbonate (OS-JERRELL) 1250 (500 Ca) MG tablet Take 1 tablet by mouth 2 (Two) Times a Day.      Calcium Citrate-Vitamin D3 (CITRACAL) 315-6.25 MG-MCG tablet tablet Take  by mouth Daily.       hydrALAZINE (APRESOLINE) 10 MG tablet Take 1 tablet by mouth Every 6 (Six) Hours As Needed (SBP > 180). 30 tablet 0    pantoprazole (PROTONIX) 40 MG EC tablet TAKE 1 TABLET BY MOUTH EVERY MORNING 90 tablet 3    [DISCONTINUED] carvedilol (COREG) 12.5 MG tablet Take 1 tablet by mouth 2 (Two) Times a Day With Meals. 60 tablet 0    [DISCONTINUED] valsartan (DIOVAN) 160 MG tablet TAKE 1 TABLET BY MOUTH TWICE DAILY 180 tablet 3    [DISCONTINUED] saccharomyces boulardii (FLORASTOR) 250 MG capsule Take 1 capsule by mouth 2 (Two) Times a Day. 60 capsule 0    [DISCONTINUED] VITAMIN E 1000 UNIT capsule Take 4 capsules by mouth Daily.       No current facility-administered medications on file prior to visit.       Allergies   Allergen Reactions    Codeine Hives and Nausea Only    Contrast Dye (Echo Or Unknown Ct/Mr) Hives    Ct Contrast Hives    Erythromycin Diarrhea     Side effect     Gadolinium Derivatives (Mr Contrast) Hives    Iodine Hives    Lisinopril Other (See Comments)     COUGH    Morphine Itching and Delirium    Moxifloxacin      Elevated liver enzymes    Sulfa Antibiotics Hives and Nausea Only    Pneumococcal Vaccines Swelling and Rash       Past Medical History:   Diagnosis Date    Abnormal stress test 01/13/2020    Added automatically from request for surgery [id number]    Acid reflux disease     Arthritis     Bell's palsy     Bronchiectasis without complication     Dr Campbell    Cerebellar infarction     Chest pain     CVA (cerebral vascular accident) 05/19/2014    Overview:  Per old records in distant past in internal capsule    History of ANTONY infection 08/23/2023    history of ANTONY but AFB negative on bronchoscopy 11/2018.     Hyperlipidemia     Hypertension     Lung disease     Osteoporosis     PAF (paroxysmal atrial fibrillation)     Skin cancer     Stroke     TIA (transient ischemic attack)     Urinary frequency     wears pads    Urinary tract infection        Past Surgical History:   Procedure Laterality  Date    ADENOIDECTOMY      APPENDECTOMY      BREAST FIBROADENOMA SURGERY      BRONCHOSCOPY      CARDIAC CATHETERIZATION N/A 01/17/2020    Procedure: Left Heart Cath;  Surgeon: Kingsley Zimmerman MD;  Location:  [facility];  Service: Cardiovascular    CARDIAC CATHETERIZATION N/A 01/17/2020    Procedure: Coronary angiography;  Surgeon: Kingsley Zimmerman MD;  Location:  [facility];  Service: Cardiovascular    CARDIAC CATHETERIZATION N/A 01/17/2020    Procedure: Left ventriculography;  Surgeon: Kingsley Zimmerman MD;  Location:  [facility];  Service: Cardiovascular    CARDIAC CATHETERIZATION N/A 01/17/2020    Procedure: Percutaneous Coronary Intervention;  Surgeon: Kingsley Zimmerman MD;  Location:  [facility];  Service: Cardiovascular    CARDIAC CATHETERIZATION N/A 01/17/2020    Procedure: Stent GERA coronary;  Surgeon: Kingsley Zimmerman MD;  Location:  [facility];  Service: Cardiovascular    CARDIAC CATHETERIZATION N/A 04/04/2022    Procedure: Coronary angiography;  Surgeon: Kvng Burgos MD;  Location:  [facility];  Service: Cardiovascular;  Laterality: N/A;    CARDIAC CATHETERIZATION N/A 04/04/2022    Procedure: Left heart cath;  Surgeon: Kvng Burgos MD;  Location:  [facility];  Service: Cardiovascular;  Laterality: N/A;    CARDIAC CATHETERIZATION N/A 04/04/2022    Procedure: Left ventriculography;  Surgeon: Kvng Burgos MD;  Location:  [facility];  Service: Cardiovascular;  Laterality: N/A;    COLONOSCOPY      HYSTERECTOMY      INTERSTIM PLACEMENT N/A 7/6/2023    Procedure: INTERSTIM STAGE 1/2;  Surgeon: Prasanna Alfaro MD;  Location: [facility];  Service: Urology;  Laterality: N/A;    INTERSTIM PLACEMENT N/A 7/6/2023    Procedure: INTERSTIM STAGE 1/2;  Surgeon: Prasanna Alfaro MD;  Location: [facility];  Service: Urology;  Laterality:  "N/A;    MASTOID SURGERY      TONSILLECTOMY         Social History     Socioeconomic History    Marital status:      Spouse name: Tyrone Vines    Number of children: 1    Years of education: 14   Tobacco Use    Smoking status: Former     Current packs/day: 0.00     Average packs/day: 1.5 packs/day for 30.0 years (45.0 ttl pk-yrs)     Types: Cigarettes     Start date:      Quit date:      Years since quittin.8    Smokeless tobacco: Never    Tobacco comments:     caffeine use: 1 CUP COFFEE DAILY    Vaping Use    Vaping status: Never Used   Substance and Sexual Activity    Alcohol use: Not Currently     Comment: quit 42 years ago    Drug use: Not Currently     Comment: 42 years ago    Sexual activity: Defer       Family History   Problem Relation Age of Onset    Breast cancer Mother     Heart failure Father     Hypertension Father     Heart disease Father     Aneurysm Daughter     Malig Hyperthermia Neg Hx        The following portions of the patient's history were reviewed and updated as appropriate: allergies, current medications, past family history, past medical history, past social history, past surgical history and problem list.       Objective:       Vitals:    10/17/24 1022   BP: 142/90   BP Location: Left arm   Patient Position: Sitting   Pulse: 51   Weight: 52.2 kg (115 lb)   Height: 160 cm (62.99\")       Body mass index is 20.38 kg/m².     Physical Exam:  Constitutional: Well appearing, Well-developed, No acute distress   HENT: Oropharynx clear and membrane moist  Eyes: Normal conjunctiva, no sclera icterus.  Neck: Supple, no carotid bruit bilaterally.  Cardiovascular: Regular rate and rhythm, No Murmur, No bilateral lower extremity edema.  Pulmonary: Normal respiratory effort, normal lung sounds, no wheezing.  Neurological: Alert and orient x 3.   Skin: Warm, dry, no ecchymosis, no rash.  Psych: Appropriate mood and affect. Normal judgment and insight.        Lab Results   Component Value " Date    GLUCOSE 99 2024    BUN 18 2024    CREATININE 0.68 2024    EGFRIFNONA 100 2020    EGFRIFAFRI >60 2023    BCR 26.5 (H) 2024    K 4.3 2024    CO2 30.9 (H) 2024    CALCIUM 9.2 2024    ALBUMIN 3.4 (L) 2024    LABIL2 1.3 2023    AST 15 2024    ALT 13 2024       Lab Results   Component Value Date    WBC 5.87 2024    HGB 11.9 (L) 2024    HCT 36.3 2024    MCV 94.8 2024     2024       Lab Results   Component Value Date    CHLPL 125 2020    CHLPL 125 2020    CHLPL 120 2020     Lab Results   Component Value Date    TRIG 94 2020    TRIG 112 2020    TRIG 100 2020     Lab Results   Component Value Date    HDL 44 (L) 2020    HDL 46 (L) 2020    HDL 41 (L) 2020     Lab Results   Component Value Date    LDL 62 2020    LDL 57 2020    LDL 59 2020     CMP 2024:  Sodium 143  Potassium 4.7  Chloride 105  CO2 28  BUN 13  Creatinine 0.7  ALT 15  AST 26  Alk phos 70    Lipid Panel 2024:  Total cholesterol: 118  Tri  HDL: 42  LDL: 47      ECG 12 Lead    Date/Time: 10/17/2024 11:23 AM  Performed by: Kingsley Zimmerman MD    Authorized by: Kingsley Zimmerman MD  Comparison: compared with previous ECG from 2024  Similar to previous ECG  Rhythm: sinus rhythm          Stress MPI 2024:  Myocardial perfusion imaging indicates a normal myocardial perfusion study with no evidence of ischemia. Impressions are consistent with a low risk study.  Left ventricular ejection fraction is hyperdynamic (Calculated EF > 70%).  Compared to the prior study from 2022 the current study reveals no changes, including the patient's report of chest pain with infusion and nonspecific ST abnormalities.  Findings consistent with an indeterminate ECG stress test.    Echocardiogram 2024 with images reviewed by myself:  Left ventricular systolic  function is normal. Calculated left ventricular EF = 61.3%  Left ventricular wall thickness is consistent with mild concentric hypertrophy.  Left ventricular diastolic function was normal.  There is mild calcification of the aortic valve.  Estimated right ventricular systolic pressure from tricuspid regurgitation is markedly elevated (>55 mmHg).     Holter 4/25/2022:  A normal monitor study.  Underlying heart rhythm is sinus rhythm with an average heart rate of of 57 bpm and a heart rate range of 46 bpm up to 79 bpm  Patient reported events correlated with underlying sinus rhythm.    Cardiac Catheterization 4/4/2022:  Left main: Normal  Left anterior descending: Normal proximally there is a long stent in the proximal and mid LAD that is widely patent and really looks fantastic 20% disease in the mid distal LAD and then normal distally the diagonals first diagonal is free of disease a second diagonal is small with a 90% ostial lesion it is covered by the stent not amenable to PCI does not look like it is changed much  Ramus intermedius:Not present  Circumflex: 20 to 30% proximal disease the remainder of the vessels normal  RCA: Is a dominant vessel.  20 to 30% proximal disease 20% mid disease normal distally    Stress MPI 4/4/2022:  Patient complained of 9/10 chest pain with exertion  Blood pressure demonstrated a hypertensive response to stress.  A horizontal ST segment depression of 1 mm in the inferolateral leads was noted during stress (II, III, aVF, V5, V6 and V4), beginning at 4 minutes of stress, and returning to baseline after more than 9 minutes of recovery.  Myocardial perfusion imaging indicates a normal myocardial perfusion study with no evidence of ischemia.  Left ventricular ejection fraction is hyperdynamic (Calculated EF > 70%). .  Because of severe chest pain, patient being evaluated further in [facility].    Cardiac Catheterization 1/17/2020:  Severe single-vessel coronary artery disease with a long  "stenoses involving the proximal to mid LAD at 90%.  There is 40% ostial circumflex stenoses and scattered 20% stenoses in the RCA.  Normal left ventricular function at 60% with a normal left ventricular filling pressure 11 mmHg.  Successful revascularization of proximal to mid LAD with placement of overlapping 2.25 x 33 mm and 2.25 x 8 mm Xience drug-eluting stents with the mid and proximal segments postdilated with a noncompliant 2.5 mm balloon to 16 roland.    Mobile Telemetry 1/16/2020:  A normal monitor study.  Underlying heart rhythm was sinus with an average rate of 86 bpm no significant arrhythmias noted during her study    Stress MPI 1/7/2020:  Patient complained of 8/10 chest pressure and \"burning sensation\" with exertion  Myocardial perfusion imaging indicates a normal myocardial perfusion study with no evidence of ischemia.  Left ventricular ejection fraction is severely reduced (Calculated EF = 22%).  The gating does not appear to accurate on this study. Would recommend correlation with echocardiogram.    Carotid Duplex 1/7/2020:  Distal right internal carotid artery mild stenosis.  Mid left internal carotid artery mild stenosis.     Assessment:          Diagnosis Plan   1. Coronary artery disease involving native coronary artery of native heart without angina pectoris  ECG 12 Lead      2. Mixed hyperlipidemia        3. PAF (paroxysmal atrial fibrillation)        4. Essential hypertension                   Plan:       Ms. Vines is a 78 y.o. woman with past medical history notable for coronary artery disease status post percutaneous intervention, paroxysmal atrial fibrillation, TIAs, hypertension, mixed hyperlipidemia, and chronic lung disease who presents to my office for follow-up.  She is doing reasonably well blood pressure still challenging to control and not quite at goal at all times I would recommend we try and break up her valsartan and to twice a day dosing at a slightly higher dose to see if this " will even out her blood pressure control.      Coronary artery disease without angina:  Continue aspirin   Continue statin  Continue beta-blocker     CBC 8/2024 demonstrates normal platelet count    Paroxysmal atrial fibrillation:  Continue Elqiuis  Continue carvedilol  CHADVASC score 7    Hypertension:  Continue current valsartan dosing  Challenging to control due to intolerance of many medications  Unable to tolerate amlodipine due to leg swelling  Had issues with leg cramping on chlorthalidone  Had bladder leakage on other diuretics including hydrochlorothiazide  Consider adding spironolactone although when we tried this in the hospital she declined taking it due to concerns that it would aggravate her bladder  BMP 8/2024 demonstrates normal sodium, potassium, and creatinine     Mixed hyperlipidemia:  Patient currently on statin therapy  Lipid panel 7/2024 demonstrates good LDL control  CMP 8/2024 demonstrates normal ALT and AST     History of CVA:  Continue aspirin and statin      Follow-up:  6 Month      Kingsley Zimmerman MD  [facility]  10/17/24  11:23 EDT

## 2024-12-11 ENCOUNTER — APPOINTMENT (OUTPATIENT)
Dept: WOMENS IMAGING | Facility: HOSPITAL | Age: 79
End: 2024-12-11
Payer: MEDICARE

## 2024-12-11 PROCEDURE — 77063 BREAST TOMOSYNTHESIS BI: CPT | Performed by: RADIOLOGY

## 2024-12-11 PROCEDURE — 77067 SCR MAMMO BI INCL CAD: CPT | Performed by: RADIOLOGY

## 2024-12-20 RX ORDER — VALSARTAN 160 MG/1
160 TABLET ORAL 2 TIMES DAILY
Qty: 180 TABLET | Refills: 3 | Status: SHIPPED | OUTPATIENT
Start: 2024-12-20

## 2025-04-16 ENCOUNTER — TELEPHONE (OUTPATIENT)
Age: 80
End: 2025-04-16
Payer: MEDICARE

## 2025-04-16 NOTE — TELEPHONE ENCOUNTER
I typically defer to the performing physician if aspirin or Eliquis needs to be held.  If they do need to be held can hold the Eliquis 2 days prior to the procedure and restart once safe from a post procedure standpoint.  Similarly if aspirin is needing to be held may hold this 5 days prior to procedure and restart once safe from post procedure bleeding standpoint.  She is on Eliquis for history of paroxysmal atrial fibrillation it would be fine for her to hold this for this procedure.  She has a remote history of stents for which she is on aspirin and again may hold this for the procedure if needed.  Let me know if there is any other questions or if there is any clearance forms.    Thank you

## 2025-04-16 NOTE — TELEPHONE ENCOUNTER
Caller: MIKE    Relationship: SELF    Best call back number: [phone]     What is the best time to reach you: ANY        What was the call regarding: PT HAVING A BRONCHIAL SCOPE ON 4/30/25 WITH DR GRIGGS .    PT WOULD LIKE TO KNOW HOW LONG TO HOLD ASPRIN AND ELIQUIS-  UNSURE IF CLEARANCE FORMS HAD BEEN SENT

## 2025-04-16 NOTE — TELEPHONE ENCOUNTER
Called and spoke with pt. She stated that she is scheduled to have a bronchial scope 04/30/25. Dr. Kim is the provider.  How many days prior should she hold her Aspirin and Eliquis? We have not received any cardiac clearance forms.

## 2025-04-18 NOTE — TELEPHONE ENCOUNTER
Called and spoke with pt. She is aware and verbalized understanding. I will fax telephone encounter so they will have it on file. I also sent this to her on [email or web address] for her records.

## 2025-05-29 ENCOUNTER — OFFICE VISIT (OUTPATIENT)
Age: 80
End: 2025-05-29
Payer: MEDICARE

## 2025-05-29 VITALS
BODY MASS INDEX: 21.16 KG/M2 | DIASTOLIC BLOOD PRESSURE: 68 MMHG | HEIGHT: 62 IN | WEIGHT: 115 LBS | SYSTOLIC BLOOD PRESSURE: 110 MMHG | HEART RATE: 54 BPM

## 2025-05-29 DIAGNOSIS — I10 ESSENTIAL HYPERTENSION: ICD-10-CM

## 2025-05-29 DIAGNOSIS — E78.2 MIXED HYPERLIPIDEMIA: ICD-10-CM

## 2025-05-29 DIAGNOSIS — I87.2 VENOUS REFLUX: Primary | ICD-10-CM

## 2025-05-29 DIAGNOSIS — I48.0 PAF (PAROXYSMAL ATRIAL FIBRILLATION): Chronic | ICD-10-CM

## 2025-05-29 DIAGNOSIS — Z95.820 S/P ANGIOPLASTY WITH STENT: ICD-10-CM

## 2025-05-29 PROCEDURE — 3078F DIAST BP <80 MM HG: CPT | Performed by: NURSE PRACTITIONER

## 2025-05-29 PROCEDURE — 3074F SYST BP LT 130 MM HG: CPT | Performed by: NURSE PRACTITIONER

## 2025-05-29 PROCEDURE — 93000 ELECTROCARDIOGRAM COMPLETE: CPT | Performed by: NURSE PRACTITIONER

## 2025-05-29 PROCEDURE — 99214 OFFICE O/P EST MOD 30 MIN: CPT | Performed by: NURSE PRACTITIONER

## 2025-05-29 NOTE — PROGRESS NOTES
Subjective:     Encounter Date:05/29/2025      Patient ID: Della Vines is a 79 y.o. female.    Chief Complaint: ER follow-up A-fib  History of Present Illness  This is a 79 year-old female who follows with Dr. Zimmerman and is known to me.  She has a past medical history of coronary artery disease s/p PCI to LAD in 2020, TIAs, hypertension, hyperlipidemia, PAF and chronic lung disease.    She is here today for follow-up visit.  She has been really struggling with her breathing and lung disease.  On April 30 she underwent a bronchoscopy.  She was found to have copious amounts of secretions with 30% of her left lung covered.  Cultures showed Pseudomonas aeruginosa.  She was started on antibiotics.  She says she still struggling with shortness of breath, particularly with exertion.  No chest pain, palpitations, swelling in her lower extremities.  She does have some balance issues.  Her blood pressure at home has been pretty well-controlled.  She has a follow-up with pulmonology on [address] [address].     I have reviewed and updated as appropriate allergies, current medications, past family history, past medical history, past surgical history and problem list.    Review of Systems   Constitutional: Negative for fever, malaise/fatigue, weight gain and weight loss.   HENT:  Negative for congestion, hoarse voice and sore throat.    Eyes:  Negative for blurred vision and double vision.   Cardiovascular:  Negative for chest pain, dyspnea on exertion, leg swelling, orthopnea, palpitations and syncope.   Respiratory:  Positive for shortness of breath. Negative for cough and wheezing.    Gastrointestinal:  Negative for abdominal pain, hematemesis, hematochezia and melena.   Genitourinary:  Negative for dysuria and hematuria.   Neurological:  Negative for dizziness, headaches, light-headedness and numbness.   Psychiatric/Behavioral:  Negative for depression. The patient is not nervous/anxious.          Current Outpatient Medications:      acetaminophen (TYLENOL) 650 MG 8 hr tablet, Take 2 tablets by mouth Every 8 (Eight) Hours As Needed for Mild Pain., Disp: , Rfl:     apixaban (ELIQUIS) 5 MG tablet tablet, Take 1 tablet by mouth Every 12 (Twelve) Hours., Disp: 180 tablet, Rfl: 3    aspirin 81 MG EC tablet, Take 1 tablet by mouth Daily., Disp: , Rfl:     atorvastatin (LIPITOR) 40 MG tablet, TAKE 1 TABLET BY MOUTH DAILY (Patient not taking: Reported on 5/29/2025), Disp: 90 tablet, Rfl: 3    calcium carbonate (OS-JERRELL) 1250 (500 Ca) MG tablet, Take 1 tablet by mouth 2 (Two) Times a Day., Disp: , Rfl:     Calcium Citrate-Vitamin D3 (CITRACAL) 315-6.25 MG-MCG tablet tablet, Take  by mouth Daily., Disp: , Rfl:     carvedilol (COREG) 12.5 MG tablet, Take 1 tablet by mouth 2 (Two) Times a Day With Meals., Disp: 180 tablet, Rfl: 3    hydrALAZINE (APRESOLINE) 10 MG tablet, Take 1 tablet by mouth Every 6 (Six) Hours As Needed (SBP > 180)., Disp: 30 tablet, Rfl: 0    pantoprazole (PROTONIX) 40 MG EC tablet, TAKE 1 TABLET BY MOUTH EVERY MORNING, Disp: 90 tablet, Rfl: 3    valsartan (DIOVAN) 160 MG tablet, TAKE 1 TABLET BY MOUTH TWICE DAILY, Disp: 180 tablet, Rfl: 3    Past Medical History:   Diagnosis Date    Abnormal stress test 01/13/2020    Added automatically from request for surgery [id number]    Acid reflux disease     Arthritis     Bell's palsy     Bronchiectasis without complication     Dr Campbell    Cerebellar infarction     Chest pain     CVA (cerebral vascular accident) 05/19/2014    Overview:  Per old records in distant past in internal capsule    History of ANTONY infection 08/23/2023    history of ANTONY but AFB negative on bronchoscopy 11/2018.     Hyperlipidemia     Hypertension     Lung disease     Osteoporosis     PAF (paroxysmal atrial fibrillation)     Skin cancer     Stroke     TIA (transient ischemic attack)     Urinary frequency     wears pads    Urinary tract infection        Past Surgical History:   Procedure Laterality Date    ADENOIDECTOMY       APPENDECTOMY      BREAST FIBROADENOMA SURGERY      BRONCHOSCOPY      CARDIAC CATHETERIZATION N/A 01/17/2020    Procedure: Left Heart Cath;  Surgeon: Kingsley Zimmerman MD;  Location:  [facility];  Service: Cardiovascular    CARDIAC CATHETERIZATION N/A 01/17/2020    Procedure: Coronary angiography;  Surgeon: Kingsley Zimmerman MD;  Location:  [facility];  Service: Cardiovascular    CARDIAC CATHETERIZATION N/A 01/17/2020    Procedure: Left ventriculography;  Surgeon: Kingsley Zimmerman MD;  Location:  [facility];  Service: Cardiovascular    CARDIAC CATHETERIZATION N/A 01/17/2020    Procedure: Percutaneous Coronary Intervention;  Surgeon: Kingsley Zimmerman MD;  Location:  [facility];  Service: Cardiovascular    CARDIAC CATHETERIZATION N/A 01/17/2020    Procedure: Stent GERA coronary;  Surgeon: Kingsley Zimmerman MD;  Location:  [facility];  Service: Cardiovascular    CARDIAC CATHETERIZATION N/A 04/04/2022    Procedure: Coronary angiography;  Surgeon: Kvng Burgos MD;  Location:  [facility];  Service: Cardiovascular;  Laterality: N/A;    CARDIAC CATHETERIZATION N/A 04/04/2022    Procedure: Left heart cath;  Surgeon: Kvng Burgos MD;  Location:  [facility];  Service: Cardiovascular;  Laterality: N/A;    CARDIAC CATHETERIZATION N/A 04/04/2022    Procedure: Left ventriculography;  Surgeon: Kvng Burgos MD;  Location:  [facility];  Service: Cardiovascular;  Laterality: N/A;    COLONOSCOPY      HYSTERECTOMY      INTERSTIM PLACEMENT N/A 7/6/2023    Procedure: INTERSTIM STAGE 1/2;  Surgeon: Prasanna Alfaro MD;  Location: [facility] MA[facility] OR;  Service: Urology;  Laterality: N/A;    INTERSTIM PLACEMENT N/A 7/6/2023    Procedure: INTERSTIM STAGE 1/2;  Surgeon: Prasanna Alfaro MD;  Location: [facility] MAIN OR;  Service: Urology;  Laterality: N/A;    MASTOID SURGERY    "   TONSILLECTOMY         Family History   Problem Relation Age of Onset    Breast cancer Mother     Heart failure Father     Hypertension Father     Heart disease Father     Aneurysm Daughter     Malig Hyperthermia Neg Hx        Social History     Tobacco Use    Smoking status: Former     Current packs/day: 0.00     Average packs/day: 1.5 packs/day for 30.0 years (45.0 ttl pk-yrs)     Types: Cigarettes     Start date:      Quit date:      Years since quittin.4    Smokeless tobacco: Never    Tobacco comments:     caffeine use: 1 CUP COFFEE DAILY    Vaping Use    Vaping status: Never Used   Substance Use Topics    Alcohol use: Not Currently     Comment: quit 42 years ago    Drug use: Not Currently     Comment: 42 years ago         ECG 12 Lead    Date/Time: 2025 3:11 PM  Performed by: Ellen Duong APRN    Authorized by: Ellen Duong APRN  Comparison: compared with previous ECG from 10/17/2024  Similar to previous ECG  Rhythm: sinus rhythm             Objective:     Visit Vitals  /68 (BP Location: Right arm, Patient Position: Sitting, Cuff Size: Adult)   Pulse 54   Ht 157.5 cm (62\")   Wt 52.2 kg (115 lb)   BMI 21.03 kg/m²             Physical Exam  Constitutional:       Appearance: Normal appearance. She is normal weight.   HENT:      Head: Normocephalic.   Neck:      Vascular: No carotid bruit.   Cardiovascular:      Rate and Rhythm: Normal rate and regular rhythm.      Chest Wall: PMI is not displaced.      Pulses:           Radial pulses are 2+ on the right side and 2+ on the left side.        Posterior tibial pulses are 2+ on the right side and 2+ on the left side.      Heart sounds: Normal heart sounds. No murmur heard.     No friction rub. No gallop.   Pulmonary:      Effort: Pulmonary effort is normal.      Breath sounds: Examination of the right-middle field reveals wheezing. Examination of the left-middle field reveals wheezing. Examination of the right-lower field reveals " wheezing. Examination of the left-lower field reveals wheezing. Decreased breath sounds and wheezing present.   Abdominal:      General: Bowel sounds are normal. There is no distension.      Palpations: Abdomen is soft.   Musculoskeletal:      Right lower leg: No edema.      Left lower leg: No edema.   Skin:     General: Skin is warm and dry.      Capillary Refill: Capillary refill takes less than 2 seconds.   Neurological:      Mental Status: She is alert and oriented to person, place, and time.   Psychiatric:         Mood and Affect: Mood normal.         Behavior: Behavior normal.         Thought Content: Thought content normal.          Lab Review:         Cardiac Procedures:   Stress MPI 8/19/2024:  Myocardial perfusion imaging indicates a normal myocardial perfusion study with no evidence of ischemia. Impressions are consistent with a low risk study.  Left ventricular ejection fraction is hyperdynamic (Calculated EF > 70%).  Compared to the prior study from 4/4/2022 the current study reveals no changes, including the patient's report of chest pain with infusion and nonspecific ST abnormalities.  Findings consistent with an indeterminate ECG stress test.     Echocardiogram 8/8/2024 :  Left ventricular systolic function is normal. Calculated left ventricular EF = 61.3%  Left ventricular wall thickness is consistent with mild concentric hypertrophy.  Left ventricular diastolic function was normal.  There is mild calcification of the aortic valve.  Estimated right ventricular systolic pressure from tricuspid regurgitation is markedly elevated (>55 mmHg).      Holter 4/25/2022:  A normal monitor study.  Underlying heart rhythm is sinus rhythm with an average heart rate of of 57 bpm and a heart rate range of 46 bpm up to 79 bpm  Patient reported events correlated with underlying sinus rhythm.     Cardiac Catheterization 4/4/2022:  Left main: Normal  Left anterior descending: Normal proximally there is a long stent in  "the proximal and mid LAD that is widely patent and really looks fantastic 20% disease in the mid distal LAD and then normal distally the diagonals first diagonal is free of disease a second diagonal is small with a 90% ostial lesion it is covered by the stent not amenable to PCI does not look like it is changed much  Ramus intermedius:Not present  Circumflex: 20 to 30% proximal disease the remainder of the vessels normal  RCA: Is a dominant vessel.  20 to 30% proximal disease 20% mid disease normal distally     Stress MPI 4/4/2022:  Patient complained of 9/10 chest pain with exertion  Blood pressure demonstrated a hypertensive response to stress.  A horizontal ST segment depression of 1 mm in the inferolateral leads was noted during stress (II, III, aVF, V5, V6 and V4), beginning at 4 minutes of stress, and returning to baseline after more than 9 minutes of recovery.  Myocardial perfusion imaging indicates a normal myocardial perfusion study with no evidence of ischemia.  Left ventricular ejection fraction is hyperdynamic (Calculated EF > 70%). .  Because of severe chest pain, patient being evaluated further in [facility].     Cardiac Catheterization 1/17/2020:  Severe single-vessel coronary artery disease with a long stenoses involving the proximal to mid LAD at 90%.  There is 40% ostial circumflex stenoses and scattered 20% stenoses in the RCA.  Normal left ventricular function at 60% with a normal left ventricular filling pressure 11 mmHg.  Successful revascularization of proximal to mid LAD with placement of overlapping 2.25 x 33 mm and 2.25 x 8 mm Xience drug-eluting stents with the mid and proximal segments postdilated with a noncompliant 2.5 mm balloon to 16 roland.     Mobile Telemetry 1/16/2020:  A normal monitor study.  Underlying heart rhythm was sinus with an average rate of 86 bpm no significant arrhythmias noted during her study     Stress MPI 1/7/2020:  Patient complained of 8/10 chest pressure and \"burning " "sensation\" with exertion  Myocardial perfusion imaging indicates a normal myocardial perfusion study with no evidence of ischemia.  Left ventricular ejection fraction is severely reduced (Calculated EF = 22%).  The gating does not appear to accurate on this study. Would recommend correlation with echocardiogram.     Carotid Duplex 1/7/2020:  Distal right internal carotid artery mild stenosis.  Mid left internal carotid artery mild stenosis    Assessment:         Diagnoses and all orders for this visit:    1. Venous reflux (Primary)    2. S/P angioplasty with stent    3. PAF (paroxysmal atrial fibrillation)    4. Mixed hyperlipidemia    5. Essential hypertension            Plan:       PAF: in sinus on EKG. She is on apixaban and carvedilol.   CAD: EKG is stable with no new ischemic changes. No anginal symptoms. Continue with aspirin, statin and beta blocker   HTN: BP stable. No changes.  HLD: on statin, Goal LDL < 70. LDL was good on lipid panel in July 2024  History of CVA: on aspirin and statin    Thank you for allowing me to participate in this patient's care. Please call with any questions or concerns. Mrs Vines will follow up with Dr. Zimmerman in 6 months.          Your medication list            Accurate as of May 29, 2025  1:16 PM. If you have any questions, ask your nurse or doctor.                CONTINUE taking these medications        Instructions Last Dose Given Next Dose Due   acetaminophen 650 MG 8 hr tablet  Commonly known as: TYLENOL      Take 2 tablets by mouth Every 8 (Eight) Hours As Needed for Mild Pain.       apixaban 5 MG tablet tablet  Commonly known as: ELIQUIS      Take 1 tablet by mouth Every 12 (Twelve) Hours.       aspirin 81 MG EC tablet      Take 1 tablet by mouth Daily.       atorvastatin 40 MG tablet  Commonly known as: LIPITOR      TAKE 1 TABLET BY MOUTH DAILY       calcium carbonate 1250 (500 Ca) MG tablet  Commonly known as: OS-JERRELL      Take 1 tablet by mouth 2 (Two) Times a Day.     "   Calcium Citrate-Vitamin D3 315-6.25 MG-MCG tablet tablet  Commonly known as: CITRACAL      Take  by mouth Daily.       carvedilol 12.5 MG tablet  Commonly known as: COREG      Take 1 tablet by mouth 2 (Two) Times a Day With Meals.       hydrALAZINE 10 MG tablet  Commonly known as: APRESOLINE      Take 1 tablet by mouth Every 6 (Six) Hours As Needed (SBP > 180).       pantoprazole 40 MG EC tablet  Commonly known as: PROTONIX      TAKE 1 TABLET BY MOUTH EVERY MORNING       valsartan 160 MG tablet  Commonly known as: DIOVAN      TAKE 1 TABLET BY MOUTH TWICE DAILY                  WALTER Dudley  05/29/25  10:06 AM EDT

## 2025-06-14 ENCOUNTER — INPATIENT HOSPITAL (OUTPATIENT)
Dept: URBAN - METROPOLITAN AREA HOSPITAL 107 | Facility: HOSPITAL | Age: 80
End: 2025-06-14
Payer: MEDICARE

## 2025-06-14 DIAGNOSIS — D62 ACUTE POSTHEMORRHAGIC ANEMIA: ICD-10-CM

## 2025-06-14 DIAGNOSIS — R19.5 OTHER FECAL ABNORMALITIES: ICD-10-CM

## 2025-06-14 PROCEDURE — 99223 1ST HOSP IP/OBS HIGH 75: CPT | Performed by: INTERNAL MEDICINE

## 2025-06-15 ENCOUNTER — INPATIENT HOSPITAL (OUTPATIENT)
Dept: URBAN - METROPOLITAN AREA HOSPITAL 107 | Facility: HOSPITAL | Age: 80
End: 2025-06-15
Payer: MEDICARE

## 2025-06-15 DIAGNOSIS — R19.5 OTHER FECAL ABNORMALITIES: ICD-10-CM

## 2025-06-15 DIAGNOSIS — D62 ACUTE POSTHEMORRHAGIC ANEMIA: ICD-10-CM

## 2025-06-15 PROCEDURE — 99232 SBSQ HOSP IP/OBS MODERATE 35: CPT | Performed by: INTERNAL MEDICINE

## (undated) DEVICE — PK CATH CARD 40

## (undated) DEVICE — CATH GUIDE ZUMA2 EBU 6F 3.5X100CM

## (undated) DEVICE — TR BAND RADIAL ARTERY COMPRESSION DEVICE: Brand: TR BAND

## (undated) DEVICE — CATH VENT MIV RADL PIG ST TIP 5F 110CM

## (undated) DEVICE — RADIFOCUS OPTITORQUE ANGIOGRAPHIC CATHETER: Brand: OPTITORQUE

## (undated) DEVICE — NC TREK CORONARY DILATATION CATHETER 2.5 MM X 15 MM / RAPID-EXCHANGE: Brand: NC TREK

## (undated) DEVICE — GLIDESHEATH SLENDER STAINLESS STEEL KIT: Brand: GLIDESHEATH SLENDER

## (undated) DEVICE — KT MANIFLD CARDIAC

## (undated) DEVICE — CATH VENT MIV RADL PIG ST TIP 4F 110CM

## (undated) DEVICE — TREK CORONARY DILATATION CATHETER 2.25 MM X 20 MM / RAPID-EXCHANGE: Brand: TREK

## (undated) DEVICE — RUNTHROUGH NS EXTRA FLOPPY PTCA GUIDEWIRE: Brand: RUNTHROUGH

## (undated) DEVICE — GW EMR FIX EXCHG J STD .035 3MM 260CM

## (undated) DEVICE — GLIDESHEATH BASIC HYDROPHILIC COATED INTRODUCER SHEATH: Brand: GLIDESHEATH

## (undated) DEVICE — DEV INDEFLATOR

## (undated) DEVICE — CATH DIAG CARD PERFORM JR4.0 BT 4F100CM

## (undated) DEVICE — CATH DIAG CARD PERFORMA JL4.0 BT 4F100CM